# Patient Record
Sex: FEMALE | Race: WHITE | NOT HISPANIC OR LATINO
[De-identification: names, ages, dates, MRNs, and addresses within clinical notes are randomized per-mention and may not be internally consistent; named-entity substitution may affect disease eponyms.]

---

## 2017-01-12 ENCOUNTER — APPOINTMENT (OUTPATIENT)
Dept: NEUROSURGERY | Facility: CLINIC | Age: 57
End: 2017-01-12

## 2017-02-16 ENCOUNTER — RX RENEWAL (OUTPATIENT)
Age: 57
End: 2017-02-16

## 2017-03-23 ENCOUNTER — APPOINTMENT (OUTPATIENT)
Dept: NEUROSURGERY | Facility: CLINIC | Age: 57
End: 2017-03-23

## 2017-03-23 VITALS
TEMPERATURE: 97.9 F | DIASTOLIC BLOOD PRESSURE: 78 MMHG | WEIGHT: 173 LBS | RESPIRATION RATE: 16 BRPM | HEART RATE: 90 BPM | HEIGHT: 67 IN | SYSTOLIC BLOOD PRESSURE: 118 MMHG | BODY MASS INDEX: 27.15 KG/M2

## 2017-08-29 ENCOUNTER — APPOINTMENT (OUTPATIENT)
Dept: NEUROSURGERY | Facility: CLINIC | Age: 57
End: 2017-08-29
Payer: MEDICARE

## 2017-08-29 DIAGNOSIS — M43.10 SPONDYLOLISTHESIS, SITE UNSPECIFIED: ICD-10-CM

## 2017-08-29 PROCEDURE — 99214 OFFICE O/P EST MOD 30 MIN: CPT

## 2017-10-19 ENCOUNTER — APPOINTMENT (OUTPATIENT)
Dept: NEUROSURGERY | Facility: CLINIC | Age: 57
End: 2017-10-19
Payer: MEDICARE

## 2017-10-19 VITALS
WEIGHT: 179 LBS | HEART RATE: 85 BPM | RESPIRATION RATE: 16 BRPM | BODY MASS INDEX: 28.09 KG/M2 | HEIGHT: 67 IN | TEMPERATURE: 97.9 F

## 2017-10-19 DIAGNOSIS — D35.2 BENIGN NEOPLASM OF PITUITARY GLAND: ICD-10-CM

## 2017-10-19 DIAGNOSIS — G89.18 OTHER ACUTE POSTPROCEDURAL PAIN: ICD-10-CM

## 2017-10-19 PROCEDURE — 99214 OFFICE O/P EST MOD 30 MIN: CPT

## 2018-04-18 ENCOUNTER — APPOINTMENT (OUTPATIENT)
Dept: ORTHOPEDIC SURGERY | Facility: CLINIC | Age: 58
End: 2018-04-18
Payer: MEDICARE

## 2018-04-18 VITALS — HEIGHT: 67 IN | BODY MASS INDEX: 28.09 KG/M2 | WEIGHT: 179 LBS

## 2018-04-18 VITALS — HEART RATE: 143 BPM | SYSTOLIC BLOOD PRESSURE: 122 MMHG | DIASTOLIC BLOOD PRESSURE: 84 MMHG

## 2018-04-18 PROCEDURE — 73110 X-RAY EXAM OF WRIST: CPT | Mod: LT

## 2018-04-18 PROCEDURE — 99204 OFFICE O/P NEW MOD 45 MIN: CPT

## 2018-04-18 PROCEDURE — 73030 X-RAY EXAM OF SHOULDER: CPT | Mod: LT

## 2018-05-14 ENCOUNTER — APPOINTMENT (OUTPATIENT)
Dept: ORTHOPEDIC SURGERY | Facility: CLINIC | Age: 58
End: 2018-05-14

## 2018-05-18 ENCOUNTER — APPOINTMENT (OUTPATIENT)
Dept: ORTHOPEDIC SURGERY | Facility: CLINIC | Age: 58
End: 2018-05-18

## 2018-05-21 ENCOUNTER — APPOINTMENT (OUTPATIENT)
Dept: NEUROSURGERY | Facility: CLINIC | Age: 58
End: 2018-05-21
Payer: MEDICARE

## 2018-05-21 VITALS
HEIGHT: 67 IN | BODY MASS INDEX: 27.62 KG/M2 | TEMPERATURE: 98.1 F | RESPIRATION RATE: 16 BRPM | WEIGHT: 176 LBS | SYSTOLIC BLOOD PRESSURE: 127 MMHG | HEART RATE: 93 BPM | DIASTOLIC BLOOD PRESSURE: 84 MMHG

## 2018-05-21 PROCEDURE — 99213 OFFICE O/P EST LOW 20 MIN: CPT

## 2018-05-21 RX ORDER — MELOXICAM 15 MG/1
15 TABLET ORAL
Qty: 30 | Refills: 0 | Status: DISCONTINUED | COMMUNITY
Start: 2017-10-05 | End: 2018-05-21

## 2018-05-21 RX ORDER — BENAZEPRIL HYDROCHLORIDE 5 MG/1
5 TABLET ORAL
Refills: 0 | Status: ACTIVE | COMMUNITY

## 2018-05-21 RX ORDER — ATORVASTATIN CALCIUM 10 MG/1
10 TABLET, FILM COATED ORAL
Refills: 0 | Status: ACTIVE | COMMUNITY

## 2018-06-01 ENCOUNTER — APPOINTMENT (OUTPATIENT)
Dept: RHEUMATOLOGY | Facility: CLINIC | Age: 58
End: 2018-06-01
Payer: MEDICARE

## 2018-06-01 VITALS
RESPIRATION RATE: 16 BRPM | BODY MASS INDEX: 26.68 KG/M2 | DIASTOLIC BLOOD PRESSURE: 85 MMHG | HEART RATE: 86 BPM | WEIGHT: 170 LBS | HEIGHT: 67 IN | SYSTOLIC BLOOD PRESSURE: 125 MMHG | OXYGEN SATURATION: 98 % | TEMPERATURE: 98 F

## 2018-06-01 DIAGNOSIS — M25.50 PAIN IN UNSPECIFIED JOINT: ICD-10-CM

## 2018-06-01 PROCEDURE — 99204 OFFICE O/P NEW MOD 45 MIN: CPT

## 2018-06-04 LAB
25(OH)D3 SERPL-MCNC: 24 NG/ML
ALBUMIN SERPL ELPH-MCNC: 4.8 G/DL
ALP BLD-CCNC: 51 U/L
ALT SERPL-CCNC: 20 U/L
ANA SER IF-ACNC: NEGATIVE
ANION GAP SERPL CALC-SCNC: 16 MMOL/L
APPEARANCE: CLEAR
APTT BLD: 31.2 SEC
AST SERPL-CCNC: 23 U/L
B2 GLYCOPROT1 AB SER QL: NEGATIVE
BACTERIA: NEGATIVE
BASOPHILS # BLD AUTO: 0.02 K/UL
BASOPHILS NFR BLD AUTO: 0.5 %
BILIRUB SERPL-MCNC: 0.4 MG/DL
BILIRUBIN URINE: NEGATIVE
BLOOD URINE: NEGATIVE
BUN SERPL-MCNC: 20 MG/DL
CALCIUM SERPL-MCNC: 9.8 MG/DL
CARDIOLIPIN AB SER IA-ACNC: NEGATIVE
CCP AB SER IA-ACNC: <8 UNITS
CENTROMERE IGG SER-ACNC: <0.2 AL
CHLORIDE SERPL-SCNC: 100 MMOL/L
CO2 SERPL-SCNC: 23 MMOL/L
COLOR: YELLOW
CREAT SERPL-MCNC: 0.82 MG/DL
CREAT SPEC-SCNC: 133 MG/DL
CREAT/PROT UR: 0.1 RATIO
CRP SERPL-MCNC: 0.3 MG/DL
DSDNA AB SER-ACNC: 14 IU/ML
ENA RNP AB SER IA-ACNC: 0.3 AL
ENA SCL70 IGG SER IA-ACNC: 0.4 AL
ENA SM AB SER IA-ACNC: <0.2 AL
ENA SS-A AB SER IA-ACNC: <0.2 AL
ENA SS-B AB SER IA-ACNC: <0.2 AL
EOSINOPHIL # BLD AUTO: 0.12 K/UL
EOSINOPHIL NFR BLD AUTO: 3.1 %
ERYTHROCYTE [SEDIMENTATION RATE] IN BLOOD BY WESTERGREN METHOD: 11 MM/HR
GLUCOSE QUALITATIVE U: NEGATIVE MG/DL
GLUCOSE SERPL-MCNC: 97 MG/DL
HCT VFR BLD CALC: 35.7 %
HGB BLD-MCNC: 12 G/DL
HYALINE CASTS: 3 /LPF
IMM GRANULOCYTES NFR BLD AUTO: 0.5 %
KETONES URINE: NEGATIVE
LEUKOCYTE ESTERASE URINE: ABNORMAL
LYMPHOCYTES # BLD AUTO: 1.17 K/UL
LYMPHOCYTES NFR BLD AUTO: 30.2 %
MAN DIFF?: NORMAL
MCHC RBC-ENTMCNC: 32.1 PG
MCHC RBC-ENTMCNC: 33.6 GM/DL
MCV RBC AUTO: 95.5 FL
MICROSCOPIC-UA: NORMAL
MONOCYTES # BLD AUTO: 0.39 K/UL
MONOCYTES NFR BLD AUTO: 10.1 %
NEUTROPHILS # BLD AUTO: 2.15 K/UL
NEUTROPHILS NFR BLD AUTO: 55.6 %
NITRITE URINE: NEGATIVE
PH URINE: 7
PLATELET # BLD AUTO: 263 K/UL
POTASSIUM SERPL-SCNC: 4.4 MMOL/L
PROT SERPL-MCNC: 7.6 G/DL
PROT UR-MCNC: 17 MG/DL
PROTEIN URINE: ABNORMAL MG/DL
RBC # BLD: 3.74 M/UL
RBC # FLD: 13 %
RED BLOOD CELLS URINE: 5 /HPF
RF+CCP IGG SER-IMP: NEGATIVE
RHEUMATOID FACT SER QL: 11 IU/ML
SODIUM SERPL-SCNC: 139 MMOL/L
SPECIFIC GRAVITY URINE: 1.03
SQUAMOUS EPITHELIAL CELLS: 15 /HPF
THYROGLOB AB SERPL-ACNC: <20 IU/ML
THYROPEROXIDASE AB SERPL IA-ACNC: <10 IU/ML
TSH SERPL-ACNC: 0.65 UIU/ML
UROBILINOGEN URINE: NEGATIVE MG/DL
WBC # FLD AUTO: 3.87 K/UL
WHITE BLOOD CELLS URINE: 13 /HPF

## 2018-06-06 ENCOUNTER — MOBILE ON CALL (OUTPATIENT)
Age: 58
End: 2018-06-06

## 2018-06-15 LAB — RNA POLYMERASE III IGG: 20.3 U

## 2018-10-24 ENCOUNTER — APPOINTMENT (OUTPATIENT)
Dept: NEUROSURGERY | Facility: CLINIC | Age: 58
End: 2018-10-24
Payer: MEDICARE

## 2018-10-24 VITALS
SYSTOLIC BLOOD PRESSURE: 139 MMHG | HEIGHT: 65.79 IN | BODY MASS INDEX: 27.98 KG/M2 | HEART RATE: 68 BPM | RESPIRATION RATE: 16 BRPM | DIASTOLIC BLOOD PRESSURE: 86 MMHG | WEIGHT: 172 LBS

## 2018-10-24 DIAGNOSIS — R13.10 DYSPHAGIA, UNSPECIFIED: ICD-10-CM

## 2018-10-24 PROCEDURE — 99213 OFFICE O/P EST LOW 20 MIN: CPT

## 2018-11-18 PROBLEM — R13.10 SWALLOWING DIFFICULTY: Status: ACTIVE | Noted: 2018-11-18

## 2019-06-26 ENCOUNTER — APPOINTMENT (OUTPATIENT)
Dept: NEUROSURGERY | Facility: CLINIC | Age: 59
End: 2019-06-26

## 2019-10-09 ENCOUNTER — APPOINTMENT (OUTPATIENT)
Dept: NEUROSURGERY | Facility: CLINIC | Age: 59
End: 2019-10-09
Payer: MEDICARE

## 2019-10-09 VITALS
OXYGEN SATURATION: 96 % | BODY MASS INDEX: 26.29 KG/M2 | WEIGHT: 167.5 LBS | HEART RATE: 80 BPM | TEMPERATURE: 98.9 F | HEIGHT: 67 IN | SYSTOLIC BLOOD PRESSURE: 123 MMHG | DIASTOLIC BLOOD PRESSURE: 88 MMHG

## 2019-10-09 PROCEDURE — 99213 OFFICE O/P EST LOW 20 MIN: CPT

## 2019-10-09 RX ORDER — HYDROCODONE BITARTRATE AND ACETAMINOPHEN 7.5; 325 MG/1; MG/1
TABLET ORAL
Refills: 0 | Status: DISCONTINUED | COMMUNITY
End: 2019-10-09

## 2019-10-28 NOTE — CONSULT LETTER
[Dear  ___] : Dear  [unfilled], [Sincerely,] : Sincerely, [FreeTextEntry2] : Dr. Desean Cornelius [FreeTextEntry1] : I have seen your patient Joleen Qiu in my office to evaluate her ongoing problems with neck pain, headaches, and radiating pain and numbness into her arms. It is approximately one year since we saw the patient previously. This very pleasant 59-year-old woman has a very complicated past history. 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 In particular she underwent posterior cervical fusion at C3-4-for injury with instability. When we evaluated the patient in 2015 there was suspicion of progressive degenerative changes at her adjacent C4-5 level as well as identification of diffuse degenerative spondylosis at C5-6 and C6-7. Because of dynamic instability at C4-5 she underwent an anterior cervical discectomy and fusion procedure in September of 2016. Much of the acute neck pain was resolved by this procedure however over time the patient has continued to have progressive and persistent problems with mechanical neck pain as well as radiating pain into both shoulders and arms. We have had extensive discussions previously about additional surgery at C5-6 and C6-7 and the implications for significant loss of neck mobility. The patient has agreed with ongoing conservative measures. However, at this time the patient has undergone several rounds of physical therapy and used acupuncture without any persistent relief. She is taking Norco under the direction of pain management on a daily basis. The patient rates her pain as a 7/10 which is almost continuous and certainly exacerbated by any activities. She denies any difficulties with walking or gentle exercise. There is no bowel or bladder dysfunction.\par \par There are no new images are reviewed at today's visit. I have gone over the CT and x-ray images from over one year ago. There is intact alignment and fusion construct at C3-4 and C4-5. Once again there is notation of significant degenerative changes at C5-6 and C6-7. I've also reviewed the patient's EMG nerve conduction studies from 2016 may which identified bilateral C5-6 and C6-7 radiculopathy, ulnar neuropathy, and mild carpal tunnel syndrome. The patient also is being treated for Raynaud's syndrome. The patient has been investigated by rheumatology and the diagnosis of osteoarthritis has been provided.\par \par On examination of the patient is in acute distress with her symptoms. The patient has a painful neck range of motion with all activities. The patient indicates significant for suboccipital and paraspinal muscular spasming with painful palpation. This is associated with intense headaches which radiate forward in an occipital neuralgia type pattern. The patient indicates pain down both arms and hands especially involving the first 3 fingers possibly right and left. Biceps and brachioradialis reflexes are present and symmetric. Stephens sign is negative.\par \par Once again the patient presents with a challenging set of ongoing painful symptoms. The patient indicates that her most prominent complaint is the neck pain with associated headaches followed by an arm symptoms. It is my impression that the generator of pain comes from the osteoarthritic changes which persist in her neck. Once again I have reviewed with the patient the potential need for surgical intervention and extension of her decompression and fusion across the C5-6 and C6-7 levels. However as always been concerned about creating loss of mobility of the neck extending from C3-C7. The patient also indicates an ongoing desire to avoid further fusion surgery if possible. I have provided the patient with a prescription and referral for treatment with mind neurology colleagues to consider focal injections and possibly Botox therapy for the intense persistent muscular spasms in the posterior neck musculature which are associated with headaches. In addition I have asked for updated dynamic x-rays of the cervical spine to determine if there is progressive instability and the adjacent segments of the neck especially of concern at C5-6. Furthermore, I have asked for an MRI scan to be updated to evaluate the degenerative changes at C5-6 and C6-7 to determine if there has been progression though would be best treated with surgical intervention.I don’t currently think an additional EMG/NC study will be required for surgical decision making\par \par I will see the patient again in my office after she has undergone further imaging studies and consultation by neurology. [FreeTextEntry3] : Pj Martínez MD, PhD, FRCPSC\par  of Neurosurgery\par [facility]\par  of Neurosurgery\par [facility] \par [address] [place] [address], [address] Floor\[place] [address]\par Tel: [phone]\par FAX: [phone]\par Email: [email or web address]\par \par

## 2019-11-14 ENCOUNTER — APPOINTMENT (OUTPATIENT)
Dept: NEUROSURGERY | Facility: CLINIC | Age: 59
End: 2019-11-14

## 2019-12-03 ENCOUNTER — APPOINTMENT (OUTPATIENT)
Dept: NEUROSURGERY | Facility: CLINIC | Age: 59
End: 2019-12-03
Payer: MEDICARE

## 2019-12-03 VITALS
OXYGEN SATURATION: 97 % | BODY MASS INDEX: 26.38 KG/M2 | TEMPERATURE: 98.2 F | HEART RATE: 76 BPM | SYSTOLIC BLOOD PRESSURE: 128 MMHG | HEIGHT: 67 IN | WEIGHT: 168.06 LBS | DIASTOLIC BLOOD PRESSURE: 75 MMHG

## 2019-12-03 DIAGNOSIS — R51 HEADACHE: ICD-10-CM

## 2019-12-03 PROCEDURE — 99213 OFFICE O/P EST LOW 20 MIN: CPT

## 2019-12-03 NOTE — CONSULT LETTER
[Dear  ___] : Dear  [unfilled], [Courtesy Letter:] : I had the pleasure of seeing your patient, [unfilled], in my office today. [Sincerely,] : Sincerely, [FreeTextEntry1] : I have seen your patient Joleen Qiu in my office to review her recent imaging studies of the cervical spine. As you will recall of this very pleasant 59-year-old woman has had extensive problems with degenerative spondylosis of the cervical spine and a fracture dislocation. She underwent a previous posterior C3-4 fusion in 2012. She had persistent instability at the adjacent C4-5 level which was treated with an anterior cervical discectomy and fusion in 2016. He was always recognized that the patient also had significant degenerative changes at C5-6 and C6-7. Unfortunately, over time the patient has had increasing and progressive problems with severe neck pain and radiculopathy involving both arms left worse than right. The neck pain is also associated with referred headaches in the suboccipital region and radiating for work. EMG nerve conduction studies have confirmed bilateral radiculopathies at C5-6 and C6-7.\par \par The patient has now undergone an updated MRI images and dynamic x-rays of the cervical spine for the purposes of surgical decision making. The images confirm a further progressive kyphotic reversal of the cervical spine ankle and a retrolisthesis at C6-7. With flexion extension there is fish mouthing of the disc spaces though there is no significant dynamic translation at C5-6 and C6-7. The MRI scan once again demonstrates extensive spondylotic changes with compression of the exiting nerve roots and compromise of the central canal. There is no myelomalacia at these levels.\par \par There is no change in the patient's neurologic examination since her previous assessment 7 weeks ago.\par \par I have reviewed with the patient that her images and case history has been reviewed by our spine group. Based on the patient's progressive pain profile, her progressive degenerative anatomy, positive bilateral radiculopathy at C5-6 and C6-7 confirmed with EMG nerve conduction studies and the patient's failure to respond in a meaningful way to ongoing conservative physical therapy and pain management for over a year she is a reasonable candidate for surgical intervention. It is our recommendation that the patient undergo an anterior cervical discectomy and fusion at these 2 affected levels. The patient indicates that she is just started a new job and will not be able to undergo treatment for at least 6 months. Pain management support. She has had previous positive response, though temporary, 2 occipital nerve blockade with local anesthetic and steroid. It may be beneficial to consider use of Botox to decrease her cervical muscular spasming with a view to temporizing until definitive surgery can be performed. The patient has indicated she will contact us as soon as she has appropriate supports to move forward with feet defined surgery.\par \par Thank you for very kindly including me in the ongoing evaluation and treatment of your patient. Please do not hesitate to contact me should you have any questions or concerns regarding this evaluation, the patient's diagnosis, or the recommendation to move forward with surgery at the soonest convenient time. [FreeTextEntry2] : Dr. Desean Cornelius MD\par [address] [place] [address]\[place] [address] \par  [FreeTextEntry3] : Pj Martínez MD, PhD, FRCPSC \par Attending Neurosurgeon \par  of Neurosurgery \par [facility] \par [address], [address]nd floor \[place] [address] \par Office: [phone] \par Fax: [phone]\par \par  [DrAmi  ___] : Dr. TREADWELL

## 2020-01-17 ENCOUNTER — APPOINTMENT (OUTPATIENT)
Dept: CARDIOLOGY | Facility: CLINIC | Age: 60
End: 2020-01-17

## 2020-01-25 ENCOUNTER — TRANSCRIPTION ENCOUNTER (OUTPATIENT)
Age: 60
End: 2020-01-25

## 2020-04-13 ENCOUNTER — APPOINTMENT (OUTPATIENT)
Dept: CARDIOLOGY | Facility: CLINIC | Age: 60
End: 2020-04-13
Payer: MEDICARE

## 2020-04-13 DIAGNOSIS — G45.8 OTHER TRANSIENT CEREBRAL ISCHEMIC ATTACKS AND RELATED SYNDROMES: ICD-10-CM

## 2020-04-13 PROCEDURE — 99202 OFFICE O/P NEW SF 15 MIN: CPT | Mod: 95

## 2020-04-13 NOTE — HISTORY OF PRESENT ILLNESS
[Home] : at home, [unfilled] , at the time of the visit. [Medical Office: ([facility])___] : at ~his/her~ medical office located in [place] [Patient] : the patient [FreeTextEntry1] : Ms. Qiu was seen via tele-health visit for vascular consultation for subclavian steal.  She has a history of HTN, HLD, and cervical spine disease for which she reports 2 prior surgeries 3 and 6 years ago on the cervical spine.  she reports fusion of C 3-5.  May need to have C 6 and 7 surgery.  When she walks, or over uses her arms, such as with raking or mowing the lawn she feels fatigued in both arms, and states that the RIGHT is worse than the LEFT.  Her shoulders get fatigued as well.  She states she is fairly active otherwise and likes to walk many miles per day.  Never smoker.  No prior arterial stents.  Her PCP is Dr. Cornelius. Denies any syncope, vision loss, dizziness with arm use.  \par \par She has a BP cuff at home and she was able to check her BP during the visit:\par right arm:\par 153/106\par 154/95\par 156/97\par 153/103\par \par Left arm:  \par 134/88\par 133/91\par 136/92\par \par works at [employer].  \par Had rheumatologic workup with Dr. Carvalho in 2018 with negative inflammatory markers.

## 2020-04-13 NOTE — ASSESSMENT
[FreeTextEntry1] : Assessment:\par 1.  Left sided subclavian steal \par - asymptomatic \par - BP discrepancy 20 mmHg between arms\par 2.  HTN\par - R sided  \par 3.  HLD\par 4.  Cervical spine disease\par \par Plan:\par 1.  Continue aspirin daily for athero seen on CT scan from 2010 \par 2.  Would pursue further imaging to clarify etiology of the subclavian stenosis (athero vs. large vessel vasculitis) however favor athero as her inflammatory markers were negative in the past.\par - bilateral upper extremity arterial duplex with focus on the subclavian\par - Depending on these results will decide on CT angio chest\par 3.  I think most of her symptoms are due to her cervical spine disease.\par 4.  Await subclavian artery duplex\par

## 2020-06-23 ENCOUNTER — APPOINTMENT (OUTPATIENT)
Dept: CARDIOLOGY | Facility: CLINIC | Age: 60
End: 2020-06-23

## 2020-08-03 ENCOUNTER — APPOINTMENT (OUTPATIENT)
Dept: NEUROSURGERY | Facility: CLINIC | Age: 60
End: 2020-08-03
Payer: MEDICARE

## 2020-08-03 VITALS
TEMPERATURE: 98 F | OXYGEN SATURATION: 96 % | HEIGHT: 67 IN | BODY MASS INDEX: 26.37 KG/M2 | SYSTOLIC BLOOD PRESSURE: 150 MMHG | WEIGHT: 168 LBS | HEART RATE: 76 BPM | DIASTOLIC BLOOD PRESSURE: 108 MMHG

## 2020-08-03 DIAGNOSIS — M21.371 FOOT DROP, RIGHT FOOT: ICD-10-CM

## 2020-08-03 DIAGNOSIS — R49.0 DYSPHONIA: ICD-10-CM

## 2020-08-03 PROCEDURE — 99214 OFFICE O/P EST MOD 30 MIN: CPT

## 2020-08-19 NOTE — CONSULT LETTER
[Dear  ___] : Dear  [unfilled], [Courtesy Letter:] : I had the pleasure of seeing your patient, [unfilled], in my office today. [Sincerely,] : Sincerely, [FreeTextEntry2] : Dr. Desean Cornelius MD\par [address] [place] [address]\[place] [id number]  [FreeTextEntry1] : This very pleasant 60-year-old woman is well-known to our service and returns today for further evaluation of her chronic neck and arm symptoms as well as concern over lower back pain with sciatica.  The patient was initially seen for significant mechanical neck pain.  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  The patient had significant adjacent instability with pain and radiculopathy that warranted an anterior cervical discectomy and fusion at the C4-5 level which was performed in September 2016.  The patient is suffering from degenerative arthritis and is followed closely by rheumatology.  Unfortunately the patient is continued to have progressive spondylotic degeneration and at our last assessment imaging showed progressive changes at C5-6 and C6-7.  EMG nerve conduction studies also confirm radiculopathy which is consistent with the patient's arm and hand symptoms especially on the left-hand side.  In addition the patient is also suffering from lower back pain and a right foot drop.  The patient denies any bowel or bladder dysfunction.  She is troubled by her symptoms on a daily basis.  The patient has recently transitioned to a new job which has decreased on the physical wear and tear and exacerbation of symptoms.  The patient also notes that she continues to have some persistent voice hoarseness and voice fatigue.\par \par There is no new imaging to review at today's visit.  X-ray of the cervical spine was done in October 2019 which did not demonstrate any anterior swelling or deviation of the trachea the fusion construct was stable.  I performed at the same time of the cervical spine showed some progressive degenerative changes with disc and osteophyte at C5-6 and C6-7.\par \par On examination the patient is clearly in acute distress with her neck symptoms.  The patient has decreased range of motion secondary to pain.  The patient has good shoulder range of motion.  The patient has intense neck and muscular pain to palpation in the suboccipital and parascapular region.  There is radiating pain down both arms especially on the left-hand side and involvement with decreased sensation in the first 3 fingers.  The biceps and brachioradialis reflexes are symmetric and normal.  Krish sign is negative.  The patient also has decreased lumbosacral range of motion.  There is increased pain and discomfort down the right leg with straight leg raise.  The patella reflexes are symmetric and normal the ankle reflexes are significantly decreased but present.  There is no clonus.  Patient has sensory decrease especially on the right-hand side involving the lateral calf and top of foot.  The patient has significant weakness of dorsiflexion on the left compared to the right.  The patient is able to do repeated toe raises.\par \par The patient has participated appropriately in extensive physical therapy and Allied supportive treatment modalities.  She is being supported by rheumatology for specific targeted treatment of arthritis.  Because of the patient's changing and progressive symptom profile I have asked for the patient undergo an updated MRI scan of the cervical spine before considering moving forward with additional surgery to decompress the C5-6 and C6-7 levels.  Both of these levels have been determined to have nerve root compression based on EMG nerve conduction studies as well as the patient's physical exam and symptoms.  The patient also has concerning symptoms of a right foot weakness of dorsiflexion and exam suggestive of a L4-5 radiculopathy.  An MRI is ordered to better understand the extent of degenerative changes within the lumbar region that may correlate with the spondylosis identified in the cervical spine.  I will see the patient again in my office once these 2 diagnostic tests have been performed to make decisions regarding surgical intervention and timing.\par \par I have made a referral to an ENT specialist for the patient have a direct fiberoptic imaging of the functional activity of the vocal cords because of her persistent hoarseness after surgery.\par \par Thank you for very kindly including me in the evaluation and ongoing treatment of your patient.  Please do not hesitate to contact me should you have any questions or concerns regarding this evaluation, the patient's diagnosis, or the request for additional diagnostic imaging studies for the purpose of surgical decision making. [FreeTextEntry3] : Pj Martínez MD, PhD, FRCPSC \par Attending Neurosurgeon \par  of Neurosurgery \par [facility] \par [address] [facility] [address] [facility], 2nd floor \[place] [address] \par Office: [phone] \par Fax: [phone]\par \par

## 2020-10-19 ENCOUNTER — APPOINTMENT (OUTPATIENT)
Dept: NEUROSURGERY | Facility: CLINIC | Age: 60
End: 2020-10-19
Payer: MEDICARE

## 2020-10-19 VITALS
OXYGEN SATURATION: 97 % | WEIGHT: 168 LBS | HEIGHT: 67 IN | HEART RATE: 73 BPM | DIASTOLIC BLOOD PRESSURE: 85 MMHG | TEMPERATURE: 97.2 F | SYSTOLIC BLOOD PRESSURE: 121 MMHG | BODY MASS INDEX: 26.37 KG/M2

## 2020-10-19 PROCEDURE — 99072 ADDL SUPL MATRL&STAF TM PHE: CPT

## 2020-10-19 PROCEDURE — 99213 OFFICE O/P EST LOW 20 MIN: CPT

## 2020-10-31 NOTE — CONSULT LETTER
[Dear  ___] : Dear  [unfilled], [Sincerely,] : Sincerely, [Courtesy Letter:] : I had the pleasure of seeing your patient, [unfilled], in my office today. [FreeTextEntry2] : Dr. Desean Cornelius MD\par [address] [place] [address]\[place] [address] \par  [FreeTextEntry1] : This very pleasant 60-year-old woman returns for further discussion of treatment options for her ongoing progressive neck pain with radiating symptoms into both arms left greater than right.  We previously saw the patient in August 2020.  The patient has now undergone updated MRI images.  You may recall that the patient has extensive arthritic degenerative spondylosis affecting the cervical spine.  There is a past history of trauma.  She has undergone previous posterior C3-4 instrumentation as well as an anterior cervical discectomy and fusion across C4-5.  Recent EMG nerve conduction studies have indicated cervical radiculopathy involving the C5-6 and C6-7 levels.\par \par The patient returns today indicating at this point that she has constant severe neck pain.  She has limited range of motion because of sharp pain and also increasing problems with left arm and hand numbness greater than the right.  She continues to take Norco on a daily basis and has had some minimal improvement in symptom control with the use of gabapentin.\par \par I have reviewed the patient's MRI images from [facility] in September 2020.  These are compared to previous x-rays, CTs, and MRIs.  There is certainly a progressive degenerative changes especially at the C5-6 and C6-7 levels.  The previous instrumentation and decompression appears to be stable.  There is severe central stenosis as well as profound loss of the disc height and significant osteophytes impinging in the foramina especially on the left-hand side.\par \par There is no change in the patient's neurologic evaluation since our previous assessment.\par \par I have discussed in detail with the patient her treatment options.  There is no question that she is appropriate for decompression and fusion surgery at the C5-6 and C6-7 levels.  I had previously discussed this option with the patient but was concerned about the significant change in overall neck mobility.  At this point in time the patient's severity of daily symptoms is dramatically affecting every aspect of her life.  Physical therapy and pain management has been unable to make a significant improvement in her day-to-day symptoms over the last several years.  The symptoms are progressive.  I have reviewed the patient's case as well with multidisciplinary spine board.  The recommendation is for ACDF at C5-6 and C6-7 with the understanding that she may have some persistent mechanical neck pain and arthritic neck pain.  The patient has indicated good understanding.  The patient would like to consider further her options before committing to surgical intervention.  The patient has indicated she will contact us likely after the holiday season to review this and make a decision regarding treatment.\par \par Thank you for for kindly including me in the ongoing evaluation and treatment of your patient.  Please do not hesitate to contact me should you have any questions or concerns regarding this evaluation, or the patient's treatment options especially consideration of an anterior cervical discectomy and fusion procedure at the C5-6 and C6-7 levels. [FreeTextEntry3] : Pj Martínez MD, PhD, FRCPSC \par Attending Neurosurgeon \par  of Neurosurgery \par [facility] \par [address] [facility], [address]nd floor \[place] [address] \par Office: [phone] \par Fax: [phone]\par \par

## 2021-03-08 ENCOUNTER — APPOINTMENT (OUTPATIENT)
Dept: RHEUMATOLOGY | Facility: CLINIC | Age: 61
End: 2021-03-08

## 2021-06-14 ENCOUNTER — APPOINTMENT (OUTPATIENT)
Dept: NEUROSURGERY | Facility: CLINIC | Age: 61
End: 2021-06-14
Payer: MEDICARE

## 2021-06-14 VITALS
HEART RATE: 87 BPM | SYSTOLIC BLOOD PRESSURE: 137 MMHG | OXYGEN SATURATION: 96 % | BODY MASS INDEX: 25.27 KG/M2 | WEIGHT: 161 LBS | DIASTOLIC BLOOD PRESSURE: 87 MMHG | TEMPERATURE: 97.9 F | HEIGHT: 67 IN

## 2021-06-14 DIAGNOSIS — M47.816 SPONDYLOSIS W/OUT MYELOPATHY OR RADICULOPATHY, LUMBAR REGION: ICD-10-CM

## 2021-06-14 PROCEDURE — 99213 OFFICE O/P EST LOW 20 MIN: CPT

## 2021-06-14 PROCEDURE — 99072 ADDL SUPL MATRL&STAF TM PHE: CPT

## 2021-06-28 ENCOUNTER — APPOINTMENT (OUTPATIENT)
Dept: RHEUMATOLOGY | Facility: CLINIC | Age: 61
End: 2021-06-28
Payer: MEDICARE

## 2021-06-28 VITALS
SYSTOLIC BLOOD PRESSURE: 95 MMHG | WEIGHT: 168 LBS | BODY MASS INDEX: 26.37 KG/M2 | OXYGEN SATURATION: 93 % | DIASTOLIC BLOOD PRESSURE: 70 MMHG | TEMPERATURE: 98 F | HEIGHT: 67 IN | HEART RATE: 73 BPM | RESPIRATION RATE: 16 BRPM

## 2021-06-28 DIAGNOSIS — Z86.39 PERSONAL HISTORY OF OTHER ENDOCRINE, NUTRITIONAL AND METABOLIC DISEASE: ICD-10-CM

## 2021-06-28 PROCEDURE — 99072 ADDL SUPL MATRL&STAF TM PHE: CPT

## 2021-06-28 PROCEDURE — 99205 OFFICE O/P NEW HI 60 MIN: CPT

## 2021-06-28 NOTE — ASSESSMENT
[FreeTextEntry1] : 61 year old female was referred for a positive RANI.  She does not exhibit any obvious signs/symptoms of connective tissue disease at this time.  I have therefore ordered some more bloodwork, including serologies, as further workup.\par She does c/o polyarticular arthritis, most suggestive of osteoarthritis.  I have therefore also ordered some x-rays to confirm her diagnosis and rule out other possible etiologies.  \par She will follow up with me again in 2-3 weeks to review her results.  In the meantime, I have recommended conservative treatment, including exercises, ibuprofen and/or Tylenol prn, warm compresses, and weight loss.

## 2021-06-28 NOTE — DATA REVIEWED
[FreeTextEntry1] : Hgb 11.0; rest of CBC unremarkable\par ALT 68; rest of CMP unremarkable\par (+)RANI 1:160\par ESR 53\par CRP 3.79 mg/dL\par Lyme negative\par UA:  30 protein;  (+)nitrites, large LE;  160 WBC, 8 RBC

## 2021-06-28 NOTE — PHYSICAL EXAM
[General Appearance - Alert] : alert [General Appearance - In No Acute Distress] : in no acute distress [Sclera] : the sclera and conjunctiva were normal [Outer Ear] : the ears and nose were normal in appearance [Oropharynx] : the oropharynx was normal [Neck Appearance] : the appearance of the neck was normal [Neck Cervical Mass (___cm)] : no neck mass was observed [Jugular Venous Distention Increased] : there was no jugular-venous distention [Thyroid Diffuse Enlargement] : the thyroid was not enlarged [Thyroid Nodule] : there were no palpable thyroid nodules [Auscultation Breath Sounds / Voice Sounds] : lungs were clear to auscultation bilaterally [Heart Rate And Rhythm] : heart rate was normal and rhythm regular [Heart Sounds] : normal S1 and S2 [Heart Sounds Gallop] : no gallops [Murmurs] : no murmurs [Heart Sounds Pericardial Friction Rub] : no pericardial rub [Edema] : there was no peripheral edema [Bowel Sounds] : normal bowel sounds [Abdomen Soft] : soft [Abdomen Tenderness] : non-tender [Abdomen Mass (___ Cm)] : no abdominal mass palpated [Cervical Lymph Nodes Enlarged Posterior Bilaterally] : posterior cervical [Cervical Lymph Nodes Enlarged Anterior Bilaterally] : anterior cervical [Supraclavicular Lymph Nodes Enlarged Bilaterally] : supraclavicular [Skin Color & Pigmentation] : normal skin color and pigmentation [Skin Turgor] : normal skin turgor [] : no rash [No Focal Deficits] : no focal deficits [Oriented To Time, Place, And Person] : oriented to person, place, and time [Impaired Insight] : insight and judgment were intact [Affect] : the affect was normal [FreeTextEntry1] : No synovitis;  (+)B/L Heberden's nodes;  (+)tenderness over B/L 1st CMC, left 1st MCP; B/L knees w/ (+)crepitus;  B/L hips w/ pain upon internal rotation;  rest of joints w/ normal ROM

## 2021-06-28 NOTE — HISTORY OF PRESENT ILLNESS
[FreeTextEntry1] : 61 year old female with PMHx as listed below reports that she has been experiencing joint pains, including her hands, knees, ankles, and neck for the past 1-2 years.  The pain is constant, though worse with activity and less severe at rest.  She describes the pain as dull/achy, up to 9 out of 10.  She denies any joint swelling or erythema.  AM stiffness.  She gets some relief from ice or Norco.  No other known alleviating factors.\par No F/C, no unintentional weight loss, no night sweats, no oral ulcers, no rashes, no alopecia, no photosensitivity, no dry eyes/dry mouth, no Raynaud symptoms, no focal weakness, no dysphagia  [Anorexia] : no anorexia [Weight Loss] : no weight loss [Malaise] : no malaise [Fever] : no fever [Chills] : no chills [Malar Facial Rash] : no malar facial rash [Fatigue] : no fatigue [Skin Lesions] : no lesions [Skin Nodules] : no skin nodules [Oral Ulcers] : no oral ulcers [Cough] : no cough [Dry Mouth] : no dry mouth [Dysphonia] : no dysphonia [Dysphagia] : no dysphagia [Shortness of Breath] : no shortness of breath [Chest Pain] : no chest pain [Arthralgias] : no arthralgias [Joint Swelling] : no joint swelling [Joint Warmth] : no joint warmth [Joint Deformity] : no joint deformity [Decreased ROM] : no decreased range of motion [Morning Stiffness] : no morning stiffness [Falls] : no falls [Difficulty Standing] : no difficulty standing [Difficulty Walking] : no difficulty walking [Dyspnea] : no dyspnea [Myalgias] : no myalgias [Muscle Weakness] : no muscle weakness [Muscle Spasms] : no muscle spasms [Muscle Cramping] : no muscle cramping [Visual Changes] : no visual changes [Eye Pain] : no eye pain [Eye Redness] : no eye redness [Dry Eyes] : no dry eyes

## 2021-06-29 LAB
C3 SERPL-MCNC: 143 MG/DL
C4 SERPL-MCNC: 32 MG/DL
CENTROMERE IGG SER-ACNC: <0.2 CD:130001892
CRP SERPL-MCNC: <3 MG/L
DSDNA AB SER-ACNC: 14 IU/ML
ENA RNP AB SER IA-ACNC: 0.3 AL
ENA SCL70 IGG SER IA-ACNC: 0.3 AL
ENA SM AB SER IA-ACNC: <0.2 AL
ENA SS-A AB SER IA-ACNC: <0.2 AL
ENA SS-B AB SER IA-ACNC: <0.2 AL
ERYTHROCYTE [SEDIMENTATION RATE] IN BLOOD BY WESTERGREN METHOD: 26 MM/HR
THYROGLOB AB SERPL-ACNC: <20 IU/ML
THYROPEROXIDASE AB SERPL IA-ACNC: <10 IU/ML

## 2021-07-02 LAB — RNA POLYMERASE III IGG: 14 UNITS

## 2021-07-25 PROBLEM — M47.816 LUMBAR SPONDYLOSIS: Status: ACTIVE | Noted: 2020-08-03

## 2021-07-25 NOTE — CONSULT LETTER
[Dear  ___] : Dear  [unfilled], [Courtesy Letter:] : I had the pleasure of seeing your patient, [unfilled], in my office today. [Sincerely,] : Sincerely, [FreeTextEntry2] : Desean Cornelius MD\par [address] [place] [address]\[place] [address] \par  [FreeTextEntry1] : This very pleasant 61-year-old patient is well-known to me for chronic neck pain and multifocal arthritis.  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  She developed progressive mechanical neck pain and unstable spondylolisthesis at the adjacent C4-5 level which was treated with an anterior cervical discectomy and fusion by myself in 2016.  Following surgery the patient had significant resolution of her mechanical neck pain.  She has been followed conservatively for the previously recognized degenerative spondylosis at C5-6 and C6-7.  Prior EMG nerve conduction studies did confirm radiculopathies at both levels.  However, most of her pain was arthritic in nature and she has been treated conservatively with pain management and physical therapy.  The patient returns today (last evaluation was October 2020) indicating that for the last 2 to 3 months she has had new symptoms of tingling down the left arm into her hand as well as numbness tingling and cramping with weakness involving both legs.  She denies any specific trauma or onset event.  The patient denies any bowel or bladder dysfunction.  However, she is having difficulty with walking secondary to pain and weakness.\par \par There is no new imaging to evaluate at today's visit.  We did review the previous CT and MRI images which showed stable fusion construct across C3-4 and C4-5.  There is also the degenerative spondylosis at C5-6 and C6-7.\par \par On examination the patient is clearly in distress with neck pain.  There is pain with range of motion.  The patient maps pain radiating through the parascapular region predominantly on the left-hand side and extending into the hand including in particular the first 3 fingers and thumb on the left-hand side and to a lesser degree the lateral aspect of the hand.  Patient has reasonable  strength.  There appears to be some thenar muscle wasting.  There also is tenderness to the proximal carpal joint on the left hand side.  Also pain at the wrist and suggestion of de Quervain's.  Otherwise, the patient does have reasonable strength.  Biceps, triceps, and brachioradialis reflexes are symmetric and normal.  From and sign is negative.  There is decreased sensation involving the entire head and and extending laterally up through the forearm.  Is normal on the right-hand side by comparison.  There appears to be normal strength at rest in both lower extremities.  Hip flexion, knee extension, and plantar and dorsi flexion are intact.  The patient has pain with range of motion in the lumbosacral region.  Patella and ankle reflexes are present.  There is no clonus.  Sensation is grossly normal.\par \par The patient has past history of investigations with rheumatology.  There was positive serum test for antinuclear antigen.  The patient has not apparently been further investigated for other possible inflammatory or connective tissue disorders.  She has not been on any specific anti-inflammatory treatment protocol.  The patient is taking a magnesium supplement to try and curb her recent bilateral leg cramping symptoms.  Patient certainly has evidence of progressive cervical radiculopathy.  Based on the patient's past progressive imaging with evidence of spondylosis and positive EMG nerve conduction studies indicating radiculopathy I have asked for the patient undergo an updated MRI scan for the purposes of surgical decision making.  Once again I would be quite concerned about adding 2 additional levels of decompression and fusion because of the implications for the patient's neck range of motion.  Before making a surgical decision updated imaging will be very pertinent.  Because of the patient's suspected arthritis or osteoarthritis involving multiple joints it is quite possible that there is progressive degenerative changes within the lumbar spine which are creating her bilateral leg symptoms.  If nothing is specifically seen in the cervical spine such as spinal cord compression with myelomalacia then an MRI scan of the lumbar spine is appropriate to rule out similar extensive degenerative changes which may be causing these new progressive leg symptoms.  Therefore an MRI scan has been ordered.  I have encouraged the patient to be seen by rheumatologist for further evaluation of the previous positive inflammatory serology tests to determine if there is a role for treatment to mitigate her symptoms.  I will see the patient again in my office once these imaging studies have been performed to evaluate if there is a role for surgical intervention.  The patient indicated good understanding of my descriptions and explanations and is in agreement with the proposed testing.\par \par Thank you for very kindly including me in the evaluation and treatment of your patient.  Please do not hesitate to contact me should you have any concerns or questions regarding this evaluation or the patient's ongoing follow-up care plan. [FreeTextEntry3] : Pj Martínez MD, PhD, FRCPSC \par Attending Neurosurgeon \par  of Neurosurgery \par [facility] \par [address], [address]nd floor \[place] [address] \par Office: [phone] \par Fax: [phone]\par \par

## 2021-08-09 ENCOUNTER — APPOINTMENT (OUTPATIENT)
Dept: NEUROSURGERY | Facility: CLINIC | Age: 61
End: 2021-08-09
Payer: MEDICARE

## 2021-08-09 VITALS
HEART RATE: 77 BPM | BODY MASS INDEX: 25.27 KG/M2 | OXYGEN SATURATION: 95 % | TEMPERATURE: 97.2 F | WEIGHT: 161 LBS | HEIGHT: 67 IN | DIASTOLIC BLOOD PRESSURE: 79 MMHG | SYSTOLIC BLOOD PRESSURE: 108 MMHG

## 2021-08-09 PROCEDURE — 99214 OFFICE O/P EST MOD 30 MIN: CPT

## 2021-08-16 ENCOUNTER — APPOINTMENT (OUTPATIENT)
Dept: RHEUMATOLOGY | Facility: CLINIC | Age: 61
End: 2021-08-16
Payer: MEDICARE

## 2021-08-16 VITALS
WEIGHT: 161 LBS | HEART RATE: 89 BPM | TEMPERATURE: 97 F | HEIGHT: 67 IN | SYSTOLIC BLOOD PRESSURE: 89 MMHG | BODY MASS INDEX: 25.27 KG/M2 | DIASTOLIC BLOOD PRESSURE: 62 MMHG | OXYGEN SATURATION: 96 %

## 2021-08-16 DIAGNOSIS — M25.562 PAIN IN RIGHT KNEE: ICD-10-CM

## 2021-08-16 DIAGNOSIS — M25.561 PAIN IN RIGHT KNEE: ICD-10-CM

## 2021-08-16 DIAGNOSIS — M89.49 OTHER HYPERTROPHIC OSTEOARTHROPATHY, MULTIPLE SITES: ICD-10-CM

## 2021-08-16 DIAGNOSIS — R76.8 OTHER SPECIFIED ABNORMAL IMMUNOLOGICAL FINDINGS IN SERUM: ICD-10-CM

## 2021-08-16 PROCEDURE — 99214 OFFICE O/P EST MOD 30 MIN: CPT

## 2021-08-16 NOTE — CONSULT LETTER
[Dear  ___] : Dear  [unfilled], [Courtesy Letter:] : I had the pleasure of seeing your patient, [unfilled], in my office today. [Sincerely,] : Sincerely, [FreeTextEntry2] : Desean Cornelius MD\par [address] [place] [address]\[place] [address] \par   [FreeTextEntry1] : This very pleasant 61-year-old woman returns at a 2-month interval for further evaluation and discussion of treatment for ongoing neck pain with bilateral cervical radiculopathy.  As you may recall the patient has extensive multifocal arthritis throughout the body.  She had an initial neck injury many years ago as a result of a gymnastics accident which resulted in a posterior C3-4 fusion.  She developed adjacent segment instability with hypermobility which required stabilization with an anterior cervical discectomy and fusion at C4-5 was performed in 2016.  The patient now has ongoing constant neck pain with associated cervicalgia and migraine headaches.  Her radiating symptoms down the arms have progressed and she has EMG nerve conduction studies which confirms radiculopathy at C5-6 and C6-7.  The patient has undergone extensive support by pain management and has done multiple rounds of physical therapy over the last 5 years without sustained benefit.  The patient is currently taking daily gabapentin and Norco.  She uses a TENS machine daily.\par \par I have reviewed with the patient her most recent MRI images of the cervical spine.  Once again we see extensive spondylotic degeneration with loss of the normal ptotic curvature of the cervical spine.  Also reviewed the x-rays and CT images which confirm that the previous hardware constructs are stable at C3-4 and C4-5.\par \par There is no significant change in the patient's neurologic examination since my previous assessment in June.  Patient certainly has radiculopathy left greater than right consistent with C5-6 and C6-7 radiculopathy.  There is 3 changes to light touch and pinprick.  There is thenar muscle wasting on the left-hand side.  The patient has reasonable strength of biceps, triceps, wrist extension but the exam is slightly modified because of joint pain.  With cervical extension or rotation with flexion to the side the patient has immediate increased symptoms radiating down the arms into the hands left greater than right.\par \par The patient has certainly undergone appropriate attempts at conservative treatment to resolve her symptoms.  Management by rheumatology, pain management, and physical therapy has not provided significant relief.  I have been concerned about the implications of further fusion surgery regarding her symptom profile but at this point in time the patient has substantial mechanical pain as well as confirmed radiculopathy that warrants consideration of decompression.  I have reviewed the patient's imaging studies with our spine group and an anterior approach with anterior cervical discectomy and fusion has been recommended by the consensus review.  The patient has indicated good understanding of the implications of further restriction in motion of her neck and is anxious to move forward with resolution of the radicular symptoms affecting her arms especially on the left-hand side.  We will make arrangements for surgery at the soonest available time once the patient has achieved medical clearance for surgery.  An anterior cervical discectomy and fusion at C5-6 and C6-7 using a zero profile construct as planned.\par \par Thank you for very kindly including me in the evaluation and support of your patient.  Please do not hesitate to contact me should you have any questions or concerns regarding this evaluation, the patient's diagnosis of progressive cervical radiculopathy, or the recommendation for an anterior cervical discectomy and fusion at C5-6 and C6-7. [FreeTextEntry3] : Pj Martínez MD, PhD, FRCPSC\par Attending Neurosurgeon \par  of Neurosurgery \par [facility]\par [address] floor \[place] [address]\par Office: [phone]\par Fax: [phone]\par \par

## 2021-08-17 PROBLEM — R76.8 ANA POSITIVE: Status: ACTIVE | Noted: 2021-06-28

## 2021-08-17 PROBLEM — M89.49 PRIMARY OSTEOARTHRITIS INVOLVING MULTIPLE JOINTS: Status: ACTIVE | Noted: 2021-08-17

## 2021-08-17 PROBLEM — M25.561 KNEE PAIN, BILATERAL: Status: ACTIVE | Noted: 2021-06-28

## 2021-08-17 NOTE — PHYSICAL EXAM
[General Appearance - Alert] : alert [General Appearance - In No Acute Distress] : in no acute distress [Sclera] : the sclera and conjunctiva were normal [Outer Ear] : the ears and nose were normal in appearance [Oropharynx] : the oropharynx was normal [Neck Appearance] : the appearance of the neck was normal [Neck Cervical Mass (___cm)] : no neck mass was observed [Jugular Venous Distention Increased] : there was no jugular-venous distention [Thyroid Diffuse Enlargement] : the thyroid was not enlarged [Thyroid Nodule] : there were no palpable thyroid nodules [Auscultation Breath Sounds / Voice Sounds] : lungs were clear to auscultation bilaterally [Heart Rate And Rhythm] : heart rate was normal and rhythm regular [Heart Sounds] : normal S1 and S2 [Heart Sounds Gallop] : no gallops [Murmurs] : no murmurs [Heart Sounds Pericardial Friction Rub] : no pericardial rub [Bowel Sounds] : normal bowel sounds [Edema] : there was no peripheral edema [Abdomen Soft] : soft [Abdomen Tenderness] : non-tender [Abdomen Mass (___ Cm)] : no abdominal mass palpated [Cervical Lymph Nodes Enlarged Posterior Bilaterally] : posterior cervical [Cervical Lymph Nodes Enlarged Anterior Bilaterally] : anterior cervical [Supraclavicular Lymph Nodes Enlarged Bilaterally] : supraclavicular [Skin Color & Pigmentation] : normal skin color and pigmentation [Skin Turgor] : normal skin turgor [] : no rash [No Focal Deficits] : no focal deficits [Oriented To Time, Place, And Person] : oriented to person, place, and time [Impaired Insight] : insight and judgment were intact [Affect] : the affect was normal [FreeTextEntry1] : No synovitis;  (+)B/L Heberden's nodes;  (+)tenderness over B/L 1st CMC, left 1st MCP; B/L knees w/ (+)crepitus;  B/L hips w/ pain upon internal rotation;  rest of joints w/ normal ROM

## 2021-08-17 NOTE — HISTORY OF PRESENT ILLNESS
[FreeTextEntry1] : Feeling "the same" since last visit.  Still w/ joint pain, unchanged.  Also still w/ severe neck pain, unchanged.  No new complaints. [Anorexia] : no anorexia [Weight Loss] : no weight loss [Malaise] : no malaise [Fever] : no fever [Chills] : no chills [Fatigue] : no fatigue [Malar Facial Rash] : no malar facial rash [Skin Lesions] : no lesions [Skin Nodules] : no skin nodules [Oral Ulcers] : no oral ulcers [Cough] : no cough [Dry Mouth] : no dry mouth [Dysphonia] : no dysphonia [Dysphagia] : no dysphagia [Shortness of Breath] : no shortness of breath [Chest Pain] : no chest pain [Arthralgias] : no arthralgias [Joint Swelling] : no joint swelling [Joint Warmth] : no joint warmth [Joint Deformity] : no joint deformity [Decreased ROM] : no decreased range of motion [Morning Stiffness] : no morning stiffness [Falls] : no falls [Difficulty Standing] : no difficulty standing [Difficulty Walking] : no difficulty walking [Dyspnea] : no dyspnea [Myalgias] : no myalgias [Muscle Weakness] : no muscle weakness [Muscle Spasms] : no muscle spasms [Muscle Cramping] : no muscle cramping [Visual Changes] : no visual changes [Eye Pain] : no eye pain [Eye Redness] : no eye redness [Dry Eyes] : no dry eyes

## 2021-08-17 NOTE — ASSESSMENT
[FreeTextEntry1] : 61 year old female with:\par 1)  (+)RANI:  no obvious si/sx of CTD.  All sub-serologies negative\par 2)  Polyarticular joint pains:  due to OA\par   - Reiterated importance of exercise\par   - diclofenac and/or Tylenol prn\par   - warm compresses\par   - OTC topical analgesics \par 3)  Severe neck pain s/p prior surgery:  pt following w/ neurosurgery who is reportedly planning further surgery.\par

## 2022-01-31 ENCOUNTER — OUTPATIENT (OUTPATIENT)
Dept: OUTPATIENT SERVICES | Facility: HOSPITAL | Age: 62
LOS: 1 days | Discharge: HOME | End: 2022-01-31
Payer: MEDICARE

## 2022-01-31 VITALS
WEIGHT: 176.37 LBS | DIASTOLIC BLOOD PRESSURE: 86 MMHG | RESPIRATION RATE: 18 BRPM | TEMPERATURE: 98 F | HEIGHT: 67 IN | SYSTOLIC BLOOD PRESSURE: 121 MMHG | OXYGEN SATURATION: 98 % | HEART RATE: 82 BPM

## 2022-01-31 DIAGNOSIS — S93.114D: ICD-10-CM

## 2022-01-31 DIAGNOSIS — M79.671 PAIN IN RIGHT FOOT: ICD-10-CM

## 2022-01-31 DIAGNOSIS — M20.11 HALLUX VALGUS (ACQUIRED), RIGHT FOOT: ICD-10-CM

## 2022-01-31 DIAGNOSIS — Z01.818 ENCOUNTER FOR OTHER PREPROCEDURAL EXAMINATION: ICD-10-CM

## 2022-01-31 DIAGNOSIS — M20.41 OTHER HAMMER TOE(S) (ACQUIRED), RIGHT FOOT: ICD-10-CM

## 2022-01-31 LAB
ANION GAP SERPL CALC-SCNC: 3 MMOL/L — LOW (ref 5–17)
BASOPHILS # BLD AUTO: 0.03 K/UL — SIGNIFICANT CHANGE UP (ref 0–0.2)
BASOPHILS NFR BLD AUTO: 0.7 % — SIGNIFICANT CHANGE UP (ref 0–2)
BUN SERPL-MCNC: 26 MG/DL — HIGH (ref 7–23)
CALCIUM SERPL-MCNC: 9.5 MG/DL — SIGNIFICANT CHANGE UP (ref 8.5–10.1)
CHLORIDE SERPL-SCNC: 106 MMOL/L — SIGNIFICANT CHANGE UP (ref 96–108)
CO2 SERPL-SCNC: 28 MMOL/L — SIGNIFICANT CHANGE UP (ref 22–31)
CREAT SERPL-MCNC: 0.88 MG/DL — SIGNIFICANT CHANGE UP (ref 0.5–1.3)
EOSINOPHIL # BLD AUTO: 0.18 K/UL — SIGNIFICANT CHANGE UP (ref 0–0.5)
EOSINOPHIL NFR BLD AUTO: 4.2 % — SIGNIFICANT CHANGE UP (ref 0–6)
GLUCOSE SERPL-MCNC: 102 MG/DL — HIGH (ref 70–99)
HCT VFR BLD CALC: 34.6 % — SIGNIFICANT CHANGE UP (ref 34.5–45)
HGB BLD-MCNC: 12 G/DL — SIGNIFICANT CHANGE UP (ref 11.5–15.5)
IMM GRANULOCYTES NFR BLD AUTO: 0.5 % — SIGNIFICANT CHANGE UP (ref 0–1.5)
LYMPHOCYTES # BLD AUTO: 1.34 K/UL — SIGNIFICANT CHANGE UP (ref 1–3.3)
LYMPHOCYTES # BLD AUTO: 31.5 % — SIGNIFICANT CHANGE UP (ref 13–44)
MCHC RBC-ENTMCNC: 32.6 PG — SIGNIFICANT CHANGE UP (ref 27–34)
MCHC RBC-ENTMCNC: 34.7 G/DL — SIGNIFICANT CHANGE UP (ref 32–36)
MCV RBC AUTO: 94 FL — SIGNIFICANT CHANGE UP (ref 80–100)
MONOCYTES # BLD AUTO: 0.49 K/UL — SIGNIFICANT CHANGE UP (ref 0–0.9)
MONOCYTES NFR BLD AUTO: 11.5 % — SIGNIFICANT CHANGE UP (ref 2–14)
NEUTROPHILS # BLD AUTO: 2.2 K/UL — SIGNIFICANT CHANGE UP (ref 1.8–7.4)
NEUTROPHILS NFR BLD AUTO: 51.6 % — SIGNIFICANT CHANGE UP (ref 43–77)
NRBC # BLD: 0 /100 WBCS — SIGNIFICANT CHANGE UP (ref 0–0)
PLATELET # BLD AUTO: 268 K/UL — SIGNIFICANT CHANGE UP (ref 150–400)
POTASSIUM SERPL-MCNC: 4 MMOL/L — SIGNIFICANT CHANGE UP (ref 3.5–5.3)
POTASSIUM SERPL-SCNC: 4 MMOL/L — SIGNIFICANT CHANGE UP (ref 3.5–5.3)
RBC # BLD: 3.68 M/UL — LOW (ref 3.8–5.2)
RBC # FLD: 12.7 % — SIGNIFICANT CHANGE UP (ref 10.3–14.5)
SODIUM SERPL-SCNC: 137 MMOL/L — SIGNIFICANT CHANGE UP (ref 135–145)
WBC # BLD: 4.26 K/UL — SIGNIFICANT CHANGE UP (ref 3.8–10.5)
WBC # FLD AUTO: 4.26 K/UL — SIGNIFICANT CHANGE UP (ref 3.8–10.5)

## 2022-01-31 PROCEDURE — 93010 ELECTROCARDIOGRAM REPORT: CPT

## 2022-01-31 RX ORDER — GABAPENTIN 400 MG/1
1 CAPSULE ORAL
Qty: 0 | Refills: 0 | DISCHARGE

## 2022-01-31 NOTE — H&P PST ADULT - NSICDXPASTMEDICALHX_GEN_ALL_CORE_FT
PAST MEDICAL HISTORY:  HLD (hyperlipidemia)     HTN (hypertension)      PAST MEDICAL HISTORY:  Chronic neck pain     HLD (hyperlipidemia)     HTN (hypertension)

## 2022-01-31 NOTE — H&P PST ADULT - HISTORY OF PRESENT ILLNESS
60 yo female , pmh- htn, hld c/o painful right bunion and hammertoe - scheduled for right bunionectomy and hammertoe correction 2nd toe   denies recent travels in the past 30 days. No fever, SOB, cough, flu like symptoms or body rash- covid screen  covid vaccine completed

## 2022-01-31 NOTE — H&P PST ADULT - PROBLEM/PLAN-2
Able to visualize and assess an entire feeding. Mom positions and latches babe well ind. Once latched good sucking/swallowing noted. Latch score 9/10. Mom c/o bilateral nipple tenderness. Discussed ensuring babe obtains a deep latch, how to properly unlatch babe, how often to feed, how to tell babe is getting enough, burping techniques and early feeding cues. Encouraged mom to use lanolin and colostrum to sore nipples. Upon inspection no break down noted and no distortion to nipple present after feeding. Dicussed hand expression and use of pump. States understanding and denies any question or concerns.    DISPLAY PLAN FREE TEXT

## 2022-01-31 NOTE — H&P PST ADULT - NSICDXPASTSURGICALHX_GEN_ALL_CORE_FT
PAST SURGICAL HISTORY:  H/O elbow surgery right    H/O foot surgery b/l    H/O shoulder surgery right    Neck pain with history of cervical spinal surgery x2    Pituitary mass 30 y ears ago

## 2022-01-31 NOTE — H&P PST ADULT - ASSESSMENT
right bunion and hammer toes  CAPRINI SCORE    AGE RELATED RISK FACTORS                                                       MOBILITY RELATED FACTORS  [ ] Age 41-60 years                                            (1 Point)                  [ ] Bed rest                                                        (1 Point)  [x ] Age: 61-74 years                                           (2 Points)                [ ] Plaster cast                                                   (2 Points)  [ ] Age= 75 years                                              (3 Points)                 [ ] Bed bound for more than 72 hours                   (2 Points)    DISEASE RELATED RISK FACTORS                                               GENDER SPECIFIC FACTORS  [ ] Edema in the lower extremities                       (1 Point)                  [ ] Pregnancy                                                     (1 Point)  [ ] Varicose veins                                               (1 Point)                  [ ] Post-partum < 6 weeks                                   (1 Point)             [ x] BMI > 25 Kg/m2                                            (1 Point)                  [ ] Hormonal therapy  or oral contraception            (1 Point)                 [ ] Sepsis (in the previous month)                        (1 Point)                  [ ] History of pregnancy complications  [ ] Pneumonia or serious lung disease                                               [ ] Unexplained or recurrent                       (1 Point)           (in the previous month)                               (1 Point)  [ ] Abnormal pulmonary function test                     (1 Point)                 SURGERY RELATED RISK FACTORS  [ ] Acute myocardial infarction                              (1 Point)                 [ ]  Section                                            (1 Point)  [ ] Congestive heart failure (in the previous month)  (1 Point)                 [ ] Minor surgery                                                 (1 Point)   [ ] Inflammatory bowel disease                             (1 Point)                 [x ] Arthroscopic surgery                                        (2 Points)  [ ] Central venous access                                    (2 Points)                [ ] General surgery lasting more than 45 minutes   (2 Points)       [ ] Stroke (in the previous month)                          (5 Points)               [ ] Elective arthroplasty                                        (5 Points)                                                                                                                                               HEMATOLOGY RELATED FACTORS                                                 TRAUMA RELATED RISK FACTORS  [ ] Prior episodes of VTE                                     (3 Points)                 [ ] Fracture of the hip, pelvis, or leg                       (5 Points)  [ ] Positive family history for VTE                         (3 Points)                 [ ] Acute spinal cord injury (in the previous month)  (5 Points)  [ ] Prothrombin 73375 A                                      (3 Points)                 [ ] Paralysis  (less than 1 month)                          (5 Points)  [ ] Factor V Leiden                                             (3 Points)                 [ ] Multiple Trauma within 1 month                         (5 Points)  [ ] Lupus anticoagulants                                     (3 Points)                                                           [ ] Anticardiolipin antibodies                                (3 Points)                                                       [ ] High homocysteine in the blood                      (3 Points)                                             [ ] Other congenital or acquired thrombophilia       (3 Points)                                                [ ] Heparin induced thrombocytopenia                  (3 Points)                                          Total Score [     5     ]  Caprini Score 0-2: Low risk, No VTE Prophylaxis required for most patient's, encourage ambulation  Caprini Score 3-6: At Risk, Pharmacologic VTE prophylaxis is indicated for most patients ( in the absence of a contraindication)  Caprini Score Greater than or = 7: High Risk , pharmacologic VTE is indicated for most patients ( in the absence of a contraindication)    Caprini score indicates that the patient is high risk for VTE event ( score 6 or greater). Surgical patient's in this group will benefit from both pharmacologic prophylaxis and intermittent compression devices . Surgical team will determine the balance between VTE  risk and bleeding risk and other clinical considerations

## 2022-02-01 DIAGNOSIS — E78.5 HYPERLIPIDEMIA, UNSPECIFIED: ICD-10-CM

## 2022-02-01 DIAGNOSIS — Z01.818 ENCOUNTER FOR OTHER PREPROCEDURAL EXAMINATION: ICD-10-CM

## 2022-02-01 DIAGNOSIS — I10 ESSENTIAL (PRIMARY) HYPERTENSION: ICD-10-CM

## 2022-02-01 DIAGNOSIS — M54.2 CERVICALGIA: ICD-10-CM

## 2022-02-01 DIAGNOSIS — Z98.890 OTHER SPECIFIED POSTPROCEDURAL STATES: Chronic | ICD-10-CM

## 2022-02-01 DIAGNOSIS — M54.2 CERVICALGIA: Chronic | ICD-10-CM

## 2022-02-01 DIAGNOSIS — E23.6 OTHER DISORDERS OF PITUITARY GLAND: Chronic | ICD-10-CM

## 2022-02-01 DIAGNOSIS — M21.611 BUNION OF RIGHT FOOT: ICD-10-CM

## 2022-02-07 ENCOUNTER — APPOINTMENT (OUTPATIENT)
Dept: NEUROSURGERY | Facility: CLINIC | Age: 62
End: 2022-02-07

## 2022-03-16 ENCOUNTER — APPOINTMENT (OUTPATIENT)
Dept: NEUROSURGERY | Facility: CLINIC | Age: 62
End: 2022-03-16

## 2022-04-11 ENCOUNTER — EMERGENCY (EMERGENCY)
Facility: HOSPITAL | Age: 62
LOS: 1 days | Discharge: HOME | End: 2022-04-11
Attending: INTERNAL MEDICINE | Admitting: INTERNAL MEDICINE
Payer: MEDICARE

## 2022-04-11 VITALS
DIASTOLIC BLOOD PRESSURE: 79 MMHG | RESPIRATION RATE: 19 BRPM | TEMPERATURE: 98 F | SYSTOLIC BLOOD PRESSURE: 132 MMHG | WEIGHT: 179.9 LBS | OXYGEN SATURATION: 96 % | HEART RATE: 109 BPM

## 2022-04-11 DIAGNOSIS — Z98.890 OTHER SPECIFIED POSTPROCEDURAL STATES: Chronic | ICD-10-CM

## 2022-04-11 DIAGNOSIS — E23.6 OTHER DISORDERS OF PITUITARY GLAND: Chronic | ICD-10-CM

## 2022-04-11 DIAGNOSIS — M54.2 CERVICALGIA: Chronic | ICD-10-CM

## 2022-04-11 PROCEDURE — 12001 RPR S/N/AX/GEN/TRNK 2.5CM/<: CPT

## 2022-04-11 PROCEDURE — 99283 EMERGENCY DEPT VISIT LOW MDM: CPT | Mod: 25

## 2022-04-11 PROCEDURE — 73140 X-RAY EXAM OF FINGER(S): CPT

## 2022-04-11 PROCEDURE — 73140 X-RAY EXAM OF FINGER(S): CPT | Mod: 26,LT

## 2022-04-11 RX ORDER — CEPHALEXIN 500 MG
500 CAPSULE ORAL ONCE
Refills: 0 | Status: COMPLETED | OUTPATIENT
Start: 2022-04-11 | End: 2022-04-11

## 2022-04-11 RX ORDER — OXYCODONE AND ACETAMINOPHEN 5; 325 MG/1; MG/1
1 TABLET ORAL
Qty: 12 | Refills: 0
Start: 2022-04-11 | End: 2022-04-13

## 2022-04-11 RX ORDER — IBUPROFEN 200 MG
600 TABLET ORAL ONCE
Refills: 0 | Status: COMPLETED | OUTPATIENT
Start: 2022-04-11 | End: 2022-04-11

## 2022-04-11 RX ORDER — CEPHALEXIN 500 MG
1 CAPSULE ORAL
Qty: 10 | Refills: 0
Start: 2022-04-11 | End: 2022-04-15

## 2022-04-11 RX ADMIN — Medication 600 MILLIGRAM(S): at 20:23

## 2022-04-11 RX ADMIN — Medication 500 MILLIGRAM(S): at 19:57

## 2022-04-11 NOTE — ED PROVIDER NOTE - OBJECTIVE STATEMENT
60 yo female with a medical history including HTN & HLD, complaining of cutting the tip of her left index finger with a knife while cutting cabbage at home just prior to arrival, now with pain and numbness at the fingertip. Right-Hand Dominant, last tetanus booster was approx. 3 years ago. Denies any other injury.

## 2022-04-11 NOTE — ED PROVIDER NOTE - ATTENDING CONTRIBUTION TO CARE
62 yo female with a medical history including HTN & HLD, complaining of cutting the tip of her left index finger with a knife while cutting cabbage at home just prior to arrival, now with pain and numbness at the fingertip. Right-Hand Dominant, last tetanus booster was approx. 3 years ago. Denies any other injury.    Xray to look for open tuft fracture; negative for bony involvement.    Digital block performed and copious irrigation done; no visible foreign body or bone.   Prophalaxis with Keflex.    TXA, surgicell applied, then bulky pressure dressing did not stop the bleeding   pt finger was anesthetized again , using 5-0 vicryl sutured in to the wound to ligate the bleeders and dremabond , and surgicele reapplied which stopped the bleeding  Dr. Kaiser:  I have reviewed and discussed with the PA/ resident the case specifics, including the history, physical assessment, evaluation, conclusion, laboratory results, and medical plan. I agree with the contents, and conclusions. I have personally examined, and interviewed the patient.

## 2022-04-11 NOTE — ED PROVIDER NOTE - CARE PROVIDER_API CALL
Dayton Burnett)  Plastic Surgery  [address], [address]  [place] [address]  Phone: [phone]  Fax: [phone]  Follow Up Time:     Ralph Blum)  Plastic Surgery  [address]  [place] [address]  Phone: [phone]  Fax: [phone]  Follow Up Time:

## 2022-04-11 NOTE — ED PROVIDER NOTE - CLINICAL SUMMARY MEDICAL DECISION MAKING FREE TEXT BOX
Last tetanus 3 yrs ago. Last tetanus 3 yrs ago.  RH Dominant    TXA, surgicell applied. 60 yo female with a mdeical history including HTN & HLD, complaining of cutting the tip of her left index finger with a knife while cutting cabbage at home just prior to arrival, now with pain and numbness at the fingertip. Right-Hand Dominant, last tetanus booster was approx. 3 years ago. Denies any other injury.    Xray to look for open tuft fracture; negative for bony involvement.    Digital block performed and copious irrigation donel; no visible foreign body or bone.   Prophalaxis with Keflex.    TXA, surgicell applied, then bulky pressure dressing and finger splint. 62 yo female with a medical history including HTN & HLD, complaining of cutting the tip of her left index finger with a knife while cutting cabbage at home just prior to arrival, now with pain and numbness at the fingertip. Right-Hand Dominant, last tetanus booster was approx. 3 years ago. Denies any other injury.    Xray to look for open tuft fracture; negative for bony involvement.    Digital block performed and copious irrigation done; no visible foreign body or bone.   Prophalaxis with Keflex.    TXA, surgicell applied, then bulky pressure dressing and finger splint.

## 2022-04-11 NOTE — ED ADULT NURSE NOTE - OBJECTIVE STATEMENT
Pt presents to ED from home for avulsion of left index finger. Pt states she was using a  knife when she cut the tip of her finger. Moderate bleeding, DSD in place, arm elevated.

## 2022-04-11 NOTE — ED PROVIDER NOTE - PATIENT PORTAL LINK FT
You can access the FollowMyHealth Patient Portal offered by [facility] by registering at the following website: [email or web address]. By joining [employer]’s FollowMyHealth portal, you will also be able to view your health information using other applications (apps) compatible with our system.

## 2022-04-11 NOTE — ED PROVIDER NOTE - NSFOLLOWUPINSTRUCTIONS_ED_ALL_ED_FT
Traumatic Finger Amputation      A traumatic finger amputation is when a person loses part or all of a finger because of an accident or injury. This condition is a medical emergency. It needs to be treated right away to prevent more damage to the finger and to save the lost part of the finger, if that is possible.      What are the causes?    This condition usually results from an accident that involves:  •A car.      •Power tools.      •Factory work.      •Farm or lawn equipment.        What are the signs or symptoms?    Symptoms of this condition include:  •Bleeding.      •Pain.      •Damage to surrounding tissues, such as bones, muscles, tendons, and skin.        How is this diagnosed?    This condition is diagnosed with a physical exam. During the exam, your health care provider will determine how severe the injury is and the best way to treat it. X-rays may be done to check for damage to the surrounding bones and tissues.      How is this treated?     This condition is treated by cleaning the wound thoroughly and with medicines for pain. Additional treatment depends on the type of injury that you have and how bad it is:    •If only the tip of your finger was removed, treatment may involve placing a protective bandage (dressing) over the wound and cleaning the wound regularly.      •If the injury is severe, a portion of skin may be taken from another part of the body (graft) and attached to the wound site until the wound heals.      •If a large portion of the finger was cut off, treatment may include a surgical procedure to reattach the finger (replantation).        Follow these instructions at home:    Medicines     •Take over-the-counter and prescription medicines only as told by your health care provider.      •If you were prescribed an antibiotic medicine, use it as told by your health care provider. Do not stop using the antibiotic even if your condition improves.      • Do not drive or use heavy machinery while taking prescription pain medicine.      Wound care   •Follow instructions from your health care provider about how to take care of your wound. Make sure you:  •Wash your hands with soap and water before you change your dressing. If soap and water are not available, use hand .      •Change your dressing as told by your health care provider.      •Leave stitches (sutures), skin glue, or adhesive strips in place. These skin closures may need to stay in place for 2 weeks or longer. If adhesive strip edges start to loosen and curl up, you may trim the loose edges. Do not remove adhesive strips completely unless your health care provider tells you to do that.      •Check your wound every day for signs of infection. Check for:  •Redness, swelling, or pain.    •Fluid or blood.    •Warmth.      •Pus or a bad smell.      General instructions     •Do exercises to strengthen your finger and hand as directed by your health care provider.    •Keep your hand raised above the level of your heart when resting.      Contact a health care provider if:    •Your wound does not seem to be healing well.    •You have redness, swelling, or pain around your wound.    •You have fluid or blood coming from your wound.    •Your wound feels warm to the touch.    •You have pus or a bad smell coming from your wound.      •You have a fever.      Get help right away if:    •You have redness spreading or extending from your wound.      Summary    •A traumatic finger amputation is when a person loses part or all of a finger because of an accident or injury.    •This condition is diagnosed with a physical exam. During the exam your health care provider will determine how severe the injury is and the best way to treat it.    •Follow instructions from your health care provider about how to take care of your wound.      This information is not intended to replace advice given to you by your health care provider. Make sure you discuss any questions you have with your health care provider. Follow up with a Hand Surgeon.    Keep dressing clean and dry.    Take antibiotics as prescribed.    Take motrin as much as 600mg every 6 hours with food for moderate pain.    Take percocet as prescribed for severe pain.    Return to ED for any concerns.    Traumatic Finger Amputation      A traumatic finger amputation is when a person loses part or all of a finger because of an accident or injury. This condition is a medical emergency. It needs to be treated right away to prevent more damage to the finger and to save the lost part of the finger, if that is possible.      What are the causes?    This condition usually results from an accident that involves:  •A car.      •Power tools.      •Factory work.      •Farm or lawn equipment.        What are the signs or symptoms?    Symptoms of this condition include:  •Bleeding.    •Pain.    •Damage to surrounding tissues, such as bones, muscles, tendons, and skin.      How is this diagnosed?    This condition is diagnosed with a physical exam. During the exam, your health care provider will determine how severe the injury is and the best way to treat it. X-rays may be done to check for damage to the surrounding bones and tissues.      How is this treated?     This condition is treated by cleaning the wound thoroughly and with medicines for pain. Additional treatment depends on the type of injury that you have and how bad it is:    •If only the tip of your finger was removed, treatment may involve placing a protective bandage (dressing) over the wound and cleaning the wound regularly.      •If the injury is severe, a portion of skin may be taken from another part of the body (graft) and attached to the wound site until the wound heals.      •If a large portion of the finger was cut off, treatment may include a surgical procedure to reattach the finger (replantation).        Follow these instructions at home:    Medicines     •Take over-the-counter and prescription medicines only as told by your health care provider.      •If you were prescribed an antibiotic medicine, use it as told by your health care provider. Do not stop using the antibiotic even if your condition improves.      • Do not drive or use heavy machinery while taking prescription pain medicine.      Wound care   •Follow instructions from your health care provider about how to take care of your wound. Make sure you:  •Wash your hands with soap and water before you change your dressing. If soap and water are not available, use hand .      •Change your dressing as told by your health care provider.      •Leave stitches (sutures), skin glue, or adhesive strips in place. These skin closures may need to stay in place for 2 weeks or longer. If adhesive strip edges start to loosen and curl up, you may trim the loose edges. Do not remove adhesive strips completely unless your health care provider tells you to do that.      •Check your wound every day for signs of infection. Check for:  •Redness, swelling, or pain.    •Fluid or blood.    •Warmth.      •Pus or a bad smell.      General instructions     •Do exercises to strengthen your finger and hand as directed by your health care provider.    •Keep your hand raised above the level of your heart when resting.      Contact a health care provider if:    •Your wound does not seem to be healing well.    •You have redness, swelling, or pain around your wound.    •You have fluid or blood coming from your wound.    •Your wound feels warm to the touch.    •You have pus or a bad smell coming from your wound.      •You have a fever.      Get help right away if:    •You have redness spreading or extending from your wound.      Summary    •A traumatic finger amputation is when a person loses part or all of a finger because of an accident or injury.    •This condition is diagnosed with a physical exam. During the exam your health care provider will determine how severe the injury is and the best way to treat it.    •Follow instructions from your health care provider about how to take care of your wound.      This information is not intended to replace advice given to you by your health care provider. Make sure you discuss any questions you have with your health care provider.

## 2022-04-11 NOTE — ED PROVIDER NOTE - NS ED ATTENDING STATEMENT MOD
This was a shared visit with the ANTONIO. I reviewed and verified the documentation and independently performed the documented:

## 2022-04-11 NOTE — ED PROVIDER NOTE - SKIN AREA #1
Avulsed/amputated approx. 1/3 of distal tip of left 2nd distal phalanx radial aspect, including segment of nail, no visible bone or muscle, no foreign body./distal/lateral

## 2022-04-11 NOTE — ED PROVIDER NOTE - UPPER EXTREMITY EXAM, LEFT
Left 2nd finger distal phalanx swelling, tenderness. FROM, flexion and extension strength intact./SWELLING/TENDERNESS

## 2022-04-12 PROBLEM — M54.2 CERVICALGIA: Chronic | Status: ACTIVE | Noted: 2022-02-01

## 2022-04-13 ENCOUNTER — APPOINTMENT (OUTPATIENT)
Dept: ORTHOPEDIC SURGERY | Facility: CLINIC | Age: 62
End: 2022-04-13
Payer: MEDICARE

## 2022-04-13 VITALS — HEIGHT: 67 IN | BODY MASS INDEX: 25.27 KG/M2 | WEIGHT: 161 LBS

## 2022-04-13 DIAGNOSIS — S61.311A LACERATION W/OUT FOREIGN BODY OF LEFT INDEX FINGER WITH DAMAGE TO NAIL, INITIAL ENCOUNTER: ICD-10-CM

## 2022-04-13 PROCEDURE — 99214 OFFICE O/P EST MOD 30 MIN: CPT

## 2022-04-13 NOTE — REASON FOR VISIT
[FreeTextEntry2] : LT index Finger. Pt sliced her finger with a kitchen knife. Pt wounds needed to be stitched closed

## 2022-04-13 NOTE — HISTORY OF PRESENT ILLNESS
[5] : 5 [Dull/Aching] : dull/aching [Constant] : constant [de-identified] : 4/13/2022: LT index Finger. Pt sliced her finger with a kitchen knife, Pt wounds needed to be stitched closed\par \par RHD [] : no [FreeTextEntry1] : LT index finger

## 2022-04-13 NOTE — PHYSICAL EXAM
[de-identified] : left index finger:\par laceration on tip of the finger\par stiffness of finger, otherwise FAROM\par NVID\par swelling and ecchymosis\par ttp over tip of the finger\par

## 2022-07-26 NOTE — CONSULT LETTER
[Dear  ___] : Dear  [unfilled], [Consult Letter:] : I had the pleasure of evaluating your patient, [unfilled]. [Please see my note below.] : Please see my note below. [Consult Closing:] : Thank you very much for allowing me to participate in the care of this patient.  If you have any questions, please do not hesitate to contact me. [Sincerely,] : Sincerely, [FreeTextEntry3] : Benedict Cee MD\par Rheumatology\par [facility]\par  of Medicine\par [facility] \par \par [address]\[place] [address]\par \par [address] [facility]\[place] [address]\par \par [address] [address]\par \par phone:  [phone]\par fax:      [phone]\par  Negative

## 2022-08-30 PROBLEM — E78.5 HYPERLIPIDEMIA, UNSPECIFIED: Chronic | Status: ACTIVE | Noted: 2022-04-11

## 2022-08-30 PROBLEM — I10 ESSENTIAL (PRIMARY) HYPERTENSION: Chronic | Status: ACTIVE | Noted: 2022-04-11

## 2022-09-14 ENCOUNTER — APPOINTMENT (OUTPATIENT)
Dept: UROGYNECOLOGY | Facility: CLINIC | Age: 62
End: 2022-09-14

## 2022-09-14 VITALS
DIASTOLIC BLOOD PRESSURE: 90 MMHG | HEIGHT: 67 IN | BODY MASS INDEX: 25.11 KG/M2 | WEIGHT: 160 LBS | TEMPERATURE: 96.8 F | SYSTOLIC BLOOD PRESSURE: 128 MMHG

## 2022-09-14 DIAGNOSIS — R31.9 HEMATURIA, UNSPECIFIED: ICD-10-CM

## 2022-09-14 LAB
BILIRUB UR QL STRIP: NORMAL
CLARITY UR: CLEAR
COLLECTION METHOD: NORMAL
GLUCOSE UR-MCNC: NORMAL
HCG UR QL: 0.2 EU/DL
HGB UR QL STRIP.AUTO: NORMAL
KETONES UR-MCNC: NORMAL
LEUKOCYTE ESTERASE UR QL STRIP: NORMAL
NITRITE UR QL STRIP: NORMAL
PH UR STRIP: 7
PROT UR STRIP-MCNC: NORMAL
SP GR UR STRIP: 1.02

## 2022-09-14 PROCEDURE — 99205 OFFICE O/P NEW HI 60 MIN: CPT | Mod: 25

## 2022-09-14 PROCEDURE — 51701 INSERT BLADDER CATHETER: CPT

## 2022-09-14 RX ORDER — ESTRADIOL 0.1 MG/G
0.1 CREAM VAGINAL
Qty: 1 | Refills: 3 | Status: ACTIVE | COMMUNITY
Start: 2022-09-14 | End: 1900-01-01

## 2022-09-14 NOTE — PROCEDURE
[Straight Catheterization] : insertion of a straight catheter [Urinary Retention] : urinary retention [___ Fr Straight Tip] : a [unfilled] in [place] straight tip catheter [Clear] : clear [Culture] : culture [Urinalysis] : urinalysis [None] : none [FreeTextEntry1] :

## 2022-09-14 NOTE — PHYSICAL EXAM
[No Acute Distress] : in no acute distress [Well developed] : well developed [Oriented x3] : oriented to person, place, and time [Normal Mood/Affect] : mood and affect are normal [Respirations regular] : ~T respiratory rate was regular [No Edema] : ~T edema was not present [Warm and Dry] : was warm and dry to touch [Vulvar Atrophy] : vulvar atrophy [Labia Majora] : were normal [Labia Minora] : were normal [Atrophy] : atrophy [Dry Mucosa] : dry mucosa [Attentuated] : perineal body was attenuated [Aa ____] : Aa [unfilled] [Ba ____] : Ba [unfilled] [C ____] : C [unfilled] [GH ____] : GH [unfilled] [PB ____] : PB [unfilled] [TVL ____] : TVL  [unfilled] [Ap ____] : Ap [unfilled] [Bp ____] : Bp [unfilled] [D ____] : D [unfilled] [] : III [Normal Position] : in a normal position [Soft] :  the cervix was soft [Uterine Adnexae] : were not tender and not enlarged [Normal] : no abnormalities [Post Void Residual ____ml] : post void residual was [unfilled] ml [Exam Deferred] : was deferred [Mass (___ Cm)] : no ~M [unfilled] abdominal mass was palpated [Tenderness] : ~T no ~M abdominal tenderness observed [Distended] : not distended

## 2022-09-14 NOTE — DISCUSSION/SUMMARY
[Discuss Alternatives/Risks/Benefits w/Patient] : All alternatives, risks, and benefits were discussed with the patient/family and all questions were answered.  Patient expressed good understanding and appreciates the importance of follow up as recommended. [Face to Face Time___ Minutes] : with [unfilled] minutes in face to face consultation. [FreeTextEntry1] : #POP\par - I counseled the patient on etiology and management alternative including: doing nothing, pelvic floor exercises, pessary placement, surgical options (native tissue repair vs. graft augmented reconstructive surgery. Patient is interested in surgical procedures. \par - Will follow-up for urodynamic testing to r/o occult CATRACHITA\par - Will follow-up with PCP for medical clearance\par - Will obtain pelvic US\par \par #Vaginal atrophy\par - I counseled patient on vaginal estrogen therapy and instructions were given for application nightly x 2 weeks and 2X per week thereafter\par \par #Blood in urine\par - F/u UA and UCx

## 2022-09-14 NOTE — HISTORY OF PRESENT ILLNESS
[FreeTextEntry1] : Joleen Qiu is a 63 yo with HTN, HLD here for evaluation of prolapse. Reports that her symptoms started in June - notes that it "looked like bulging ball" that feels very irritated and comes out after a long day. Has been having some difficulty emptying her  bladder because of this and often has to strain. Does not feel like she empties well. Reports some stinging with urination, no pain. No issues with constipation. Denies urgency urinary incontinence other than in the morning when she first wakes up. No stress urinary incontinence. She wants to get this fixed as soon as possible. Patient works at [employer] and does a lot of heavy lifting. She sadly recently lost her son, reports depression and unfortunately has been having a hard time getting an appointment with therapist but feel that she is recovering.

## 2022-09-15 DIAGNOSIS — Z86.59 PERSONAL HISTORY OF OTHER MENTAL AND BEHAVIORAL DISORDERS: ICD-10-CM

## 2022-09-16 ENCOUNTER — NON-APPOINTMENT (OUTPATIENT)
Age: 62
End: 2022-09-16

## 2022-09-16 LAB — BACTERIA UR CULT: NORMAL

## 2022-09-20 ENCOUNTER — APPOINTMENT (OUTPATIENT)
Dept: ULTRASOUND IMAGING | Facility: CLINIC | Age: 62
End: 2022-09-20

## 2022-09-20 ENCOUNTER — NON-APPOINTMENT (OUTPATIENT)
Age: 62
End: 2022-09-20

## 2022-09-20 PROCEDURE — 93971 EXTREMITY STUDY: CPT | Mod: LT

## 2022-09-20 PROCEDURE — 76830 TRANSVAGINAL US NON-OB: CPT

## 2022-09-29 LAB
APPEARANCE: CLEAR
BACTERIA: NEGATIVE
BILIRUBIN URINE: NEGATIVE
BLOOD URINE: NEGATIVE
COLOR: YELLOW
GLUCOSE QUALITATIVE U: NEGATIVE
HYALINE CASTS: 3 /LPF
KETONES URINE: NEGATIVE
LEUKOCYTE ESTERASE URINE: NEGATIVE
MICROSCOPIC-UA: NORMAL
NITRITE URINE: NEGATIVE
PH URINE: 7
PROTEIN URINE: NORMAL
RED BLOOD CELLS URINE: 4 /HPF
SPECIFIC GRAVITY URINE: 1.03
SQUAMOUS EPITHELIAL CELLS: 2 /HPF
UROBILINOGEN URINE: NORMAL
WHITE BLOOD CELLS URINE: 1 /HPF

## 2022-10-19 ENCOUNTER — OUTPATIENT (OUTPATIENT)
Dept: OUTPATIENT SERVICES | Facility: HOSPITAL | Age: 62
LOS: 1 days | End: 2022-10-19
Payer: MEDICARE

## 2022-10-19 ENCOUNTER — APPOINTMENT (OUTPATIENT)
Dept: UROGYNECOLOGY | Facility: CLINIC | Age: 62
End: 2022-10-19

## 2022-10-19 VITALS — DIASTOLIC BLOOD PRESSURE: 79 MMHG | HEART RATE: 73 BPM | SYSTOLIC BLOOD PRESSURE: 128 MMHG

## 2022-10-19 DIAGNOSIS — Z98.890 OTHER SPECIFIED POSTPROCEDURAL STATES: Chronic | ICD-10-CM

## 2022-10-19 DIAGNOSIS — Z01.818 ENCOUNTER FOR OTHER PREPROCEDURAL EXAMINATION: ICD-10-CM

## 2022-10-19 DIAGNOSIS — M54.2 CERVICALGIA: Chronic | ICD-10-CM

## 2022-10-19 DIAGNOSIS — E23.6 OTHER DISORDERS OF PITUITARY GLAND: Chronic | ICD-10-CM

## 2022-10-19 LAB
BILIRUB UR QL STRIP: NEGATIVE
CLARITY UR: CLEAR
COLLECTION METHOD: NORMAL
GLUCOSE UR-MCNC: NEGATIVE
HCG UR QL: 0.2 EU/DL
HGB UR QL STRIP.AUTO: NORMAL
KETONES UR-MCNC: NEGATIVE
LEUKOCYTE ESTERASE UR QL STRIP: NEGATIVE
NITRITE UR QL STRIP: NEGATIVE
PH UR STRIP: 5.5
PROT UR STRIP-MCNC: NEGATIVE
SP GR UR STRIP: 1.02

## 2022-10-19 PROCEDURE — 51784 ANAL/URINARY MUSCLE STUDY: CPT | Mod: 26

## 2022-10-19 PROCEDURE — 51797 INTRAABDOMINAL PRESSURE TEST: CPT

## 2022-10-19 PROCEDURE — 51729 CYSTOMETROGRAM W/VP&UP: CPT | Mod: 26

## 2022-10-19 PROCEDURE — 51797 INTRAABDOMINAL PRESSURE TEST: CPT | Mod: 26

## 2022-10-19 PROCEDURE — 51784 ANAL/URINARY MUSCLE STUDY: CPT

## 2022-10-19 PROCEDURE — 51729 CYSTOMETROGRAM W/VP&UP: CPT

## 2022-10-20 LAB
APPEARANCE: CLEAR
BACTERIA: NEGATIVE
BILIRUBIN URINE: NEGATIVE
BLOOD URINE: NEGATIVE
COLOR: YELLOW
GLUCOSE QUALITATIVE U: NEGATIVE
HYALINE CASTS: 2 /LPF
KETONES URINE: NEGATIVE
LEUKOCYTE ESTERASE URINE: NEGATIVE
MICROSCOPIC-UA: NORMAL
NITRITE URINE: NEGATIVE
PH URINE: 5.5
PROTEIN URINE: NEGATIVE
RED BLOOD CELLS URINE: 1 /HPF
SPECIFIC GRAVITY URINE: 1.02
SQUAMOUS EPITHELIAL CELLS: 1 /HPF
UROBILINOGEN URINE: NORMAL
WHITE BLOOD CELLS URINE: 1 /HPF

## 2022-10-21 ENCOUNTER — NON-APPOINTMENT (OUTPATIENT)
Age: 62
End: 2022-10-21

## 2022-10-21 LAB — BACTERIA UR CULT: NORMAL

## 2022-10-24 ENCOUNTER — APPOINTMENT (OUTPATIENT)
Dept: UROGYNECOLOGY | Facility: CLINIC | Age: 62
End: 2022-10-24

## 2022-10-24 VITALS — TEMPERATURE: 97.6 F

## 2022-10-24 VITALS — TEMPERATURE: 96.6 F

## 2022-10-24 PROCEDURE — 99214 OFFICE O/P EST MOD 30 MIN: CPT

## 2022-10-25 NOTE — DISCUSSION/SUMMARY
[FreeTextEntry1] : 63 yo with Stage III POP. We reviewed risks/benefits and alternative management options again today. We reviewed difference between abdominal approach and vaginal approach. We reviewed difference between native tissue repair and graft augmented repair as well as risks of mesh erosion. We reviewed urodynamic results and management options including expectant management vs. concurrent anti-incontinence procedure with sling. She would prefer to have concurrent procedure. She is not interested in hysterectomy and thinks she would like to have a sacro-hysteropexy. Given significant reported bother from prolapse, I offered her a trial of pessary in the interim, she was agreeable to this. She will RTO in one week for pessary fitting as well as to finalize surgery plan in order to schedule procedure. She will continue vaginal estrogen.\par \par We also reviewed results of pelvic ultrasound, which showed an EMS of 2mm and simple fluid in cavity. Based on current literature we discussed that simple fluid in the setting of thin strip is considered within normal limits of US findings. Given absence of postmenopausal bleeding, no need for endometrial biopsy at this time.

## 2022-10-25 NOTE — PHYSICAL EXAM
[FreeTextEntry1] : General: Well, appearing. Alert and orientated. No acute distress\par HEENT: Normocephalic, atraumatic\par Respiratory: Speaking in full sentences comfortably, normal work of breathing and no cough during visit\par Extremities: No upper extremity edema noted\par Skin: No obvious rash or skin lesions\par Neuro: Orientated X 3, speech is fluent, normal rate\par Psych: Normal mood and affect

## 2022-10-25 NOTE — HISTORY OF PRESENT ILLNESS
[FreeTextEntry1] : 63 yo with stage III pelvic organ prolapse (anterior predominant) here for follow-up and further discussion of management. Reports continued discomfort and bother from prolapse. She underwent urodynamic testing on 10/19/22, which revealed evidence of occult CATRACHITA.

## 2022-10-26 ENCOUNTER — APPOINTMENT (OUTPATIENT)
Dept: NEUROSURGERY | Facility: CLINIC | Age: 62
End: 2022-10-26

## 2022-10-26 VITALS
HEART RATE: 72 BPM | OXYGEN SATURATION: 97 % | DIASTOLIC BLOOD PRESSURE: 78 MMHG | SYSTOLIC BLOOD PRESSURE: 122 MMHG | TEMPERATURE: 98.2 F | BODY MASS INDEX: 25.01 KG/M2 | HEIGHT: 67 IN | WEIGHT: 159.38 LBS

## 2022-10-26 DIAGNOSIS — R27.8 OTHER LACK OF COORDINATION: ICD-10-CM

## 2022-10-26 PROCEDURE — 99214 OFFICE O/P EST MOD 30 MIN: CPT

## 2022-11-03 ENCOUNTER — APPOINTMENT (OUTPATIENT)
Dept: UROGYNECOLOGY | Facility: CLINIC | Age: 62
End: 2022-11-03

## 2022-11-03 VITALS
SYSTOLIC BLOOD PRESSURE: 125 MMHG | DIASTOLIC BLOOD PRESSURE: 70 MMHG | BODY MASS INDEX: 23.23 KG/M2 | HEIGHT: 67 IN | WEIGHT: 148 LBS | TEMPERATURE: 97.2 F

## 2022-11-03 PROCEDURE — 57160 INSERT PESSARY/OTHER DEVICE: CPT

## 2022-11-14 ENCOUNTER — APPOINTMENT (OUTPATIENT)
Dept: NEUROSURGERY | Facility: CLINIC | Age: 62
End: 2022-11-14

## 2022-11-14 VITALS
HEIGHT: 67 IN | BODY MASS INDEX: 24.96 KG/M2 | OXYGEN SATURATION: 99 % | DIASTOLIC BLOOD PRESSURE: 82 MMHG | SYSTOLIC BLOOD PRESSURE: 137 MMHG | TEMPERATURE: 97.9 F | HEART RATE: 61 BPM | WEIGHT: 159 LBS

## 2022-11-14 DIAGNOSIS — M54.50 LOW BACK PAIN, UNSPECIFIED: ICD-10-CM

## 2022-11-14 DIAGNOSIS — M43.12 SPONDYLOLISTHESIS, CERVICAL REGION: ICD-10-CM

## 2022-11-14 PROCEDURE — 99214 OFFICE O/P EST MOD 30 MIN: CPT

## 2022-11-14 NOTE — REVIEW OF SYSTEMS
[Hand Weakness] :  hand weakness [Poor Coordination] : poor coordination [Difficulty Writing] : difficulty writing [Numbness] : numbness [Tingling] : tingling [Difficulty Walking] : difficulty walking [Negative] : Heme/Lymph

## 2022-11-14 NOTE — CONSULT LETTER
[Dear  ___] : Dear  [unfilled], [Courtesy Letter:] : I had the pleasure of seeing your patient, [unfilled], in my office today. [Sincerely,] : Sincerely, [FreeTextEntry2] : Desean Cornelius MD\par [address] [place] [address]\[place] [address] \par  [FreeTextEntry1] : Joleen Qiu is a 62-year-old female who presents today with cervical and lumbar symptoms. As you may recall, patient has extensive multifocal arthritis throughout the body.  Patient sustained a neck injury many years ago after a gymnastics accident which resulted in posterior C3-4 fusion.  She then developed adjacent segment instability which required further anterior cervical discectomy fusion at C4-5 performed in 2016.  Patient was last seen in our office in August of 2021.  At that time a C5 cervical discectomy and fusion was discussed for ongoing neck pain with cervicalgia and bilateral arm pain.  At that time she had underwent EMG study which also indicated radiculopathy at C5-6 and C6-7.\par \par Patient reports 8 out of 10 worsening sharp constant neck pain.  Patient reports constant burning and paresthesias to left entire upper extremity and hand and right entire arm.  PAtient endorses bilateral dexterity difficulties, weakness and dropping objects from hand, left greater than right.  Patient with bilateral arm shooting pains, left side greater than right.\par \par Patient endorses 7 out of 10 constant sharp lower back pain.  She denies any leg pain or lower extremity numbness tingling or weakness.  She reports right foot occasionally gets stuck with walking.  Patient denies bowel or bladder dysfunction or saddle anesthesia.\par \par Patient has been under the care of pain management since 2015. She was last seen one month ago. Patient currently takes gabapentin and norco. \par \par Patient is alert and oriented. No distress noted. 5/5 strength noted to bilateral upper and lower extremities.  Negative Krish's.  Negative clonus.  Reflexes to upper extremity present and equal.  Unable to elicit left patellar reflex and bilateral Achilles tendon reflexes.  Right patellar reflex present. \par \par I have provided the patient with a prescription for x-ray and MRI of the cervical and lumbar spine.  I have also provided patient with a referral for physical therapy.  Patient will follow-up in office to review images with neurosurgeon.  Patient aware to call with any further questions or concerns or with any new or worsening symptoms.\par  [FreeTextEntry3] : Bere Teresa, MSN, FNP-BC\par Nurse Practitioner\par Neurosurgery\par [address] [place] [address], [address]nd floor \[place] [address] \par Office: [phone] \par Fax: [phone]\par \par

## 2022-11-17 ENCOUNTER — APPOINTMENT (OUTPATIENT)
Dept: UROGYNECOLOGY | Facility: CLINIC | Age: 62
End: 2022-11-17

## 2022-11-17 PROCEDURE — 99212 OFFICE O/P EST SF 10 MIN: CPT

## 2022-11-17 PROCEDURE — A4562: CPT

## 2022-11-17 NOTE — DISCUSSION/SUMMARY
[FreeTextEntry1] : Refitted with #6 pessary, no discomfort. Will continue with this in the interim until surgery scheduling and will follow-up for pre-operative visit. Aware to call office with any questions/concerns.

## 2022-11-17 NOTE — HISTORY OF PRESENT ILLNESS
[FreeTextEntry1] : 61 yo with stage III POP fitted with #5 RWS last visit, awaiting surgical date. Reported some discomfort from pessary over the last 2 days and feels that the bladder is protruding beyond the pessary.

## 2022-11-17 NOTE — PROCEDURE
[Good Fit] : fits well [Refit] : refit needed [Erosion] : no evidence of erosion [Erythema] : no erythema [Discharge] : no vaginal discharge [Infection] : no evidence of infection [Pessary Inserted] : inserted [None] : no bleeding [FreeTextEntry1] : #6 RWS [de-identified] : refitted from #5 RWS

## 2022-11-21 ENCOUNTER — NON-APPOINTMENT (OUTPATIENT)
Age: 62
End: 2022-11-21

## 2022-12-19 PROBLEM — M43.12 ACQUIRED SPONDYLOLISTHESIS OF CERVICAL VERTEBRA: Status: ACTIVE | Noted: 2022-12-19

## 2022-12-19 NOTE — CONSULT LETTER
[Dear  ___] : Dear  [unfilled], [Courtesy Letter:] : I had the pleasure of seeing your patient, [unfilled], in my office today. [Sincerely,] : Sincerely, [FreeTextEntry2] : Desean Cornelius MD\par [address] [place] [address]\par [place] [id number]  [FreeTextEntry1] : Joleen Qiu is a 62-year-old female who presents today with progressive cervical and lumbar symptoms.   As you may recall, patient has extensive multifocal arthritis throughout the body.  Patient sustained a neck injury many years ago after a gymnastics accident which resulted in posterior C3-4 fusion.  She then developed adjacent segment instability which required further anterior cervical discectomy fusion at C4-5 performed in 2016.  Patient was last seen in our office in August of 2021.  At that time a C5 cervical discectomy and fusion was discussed for ongoing neck pain with cervicalgia and bilateral arm pain.  At that time she had underwent EMG study which also indicated radiculopathy at C5-6 and C6-7.  However, the patient opted to pursue non-surgical measures.  Today the patient is reporting significant progressive left sided cervical radiculopathy and lower back pain.   The patient is reporting neck pain with left arm burning dysesthetic pain which actually improves if she keeps her arm straight.  The pain is severe at times.   The patient has trouble walking and difficulty with coordination.  She denies any leg pain or lower extremity numbness tingling or weakness.  She reports right foot occasionally gets stuck with walking.  Patient denies bowel or bladder dysfunction or saddle anesthesia.   In addition, the patient is having surgery for a prolapsed uterus.  She continues to have lower back pain with occasional tripping of her right foot.    Left arm weakness is present.  No bowel or bladder functional changes.  No urine or stool incontinence.  \par \par The patient and I have reviewed her most recent MRI of the cervical spine from [facility] from 11/2022.   At C5-6 , C6- 7 there is notable spondylolisthesis with spinal cord deformity   A lumbar MRI from [facility] shows no scoliosis and proper alignment.    L3 -4 and L2 -3 on the left and L4 -5 on the right shows stenosis.   \par Patient is alert and oriented. No distress noted. 5/5 strength noted to bilateral upper and lower extremities.  There may be a slight weakness of her dorsiflexion muscle on the right foot.  Negative Krish's.  Negative clonus.  Reflexes to upper extremity present and equal.  Unable to elicit left patellar reflex and bilateral Achilles tendon reflexes.  \par \par The patient and I had a lengthy discussion about significant cervical spine disease with evidence of instability.  I have recommended corrective surgery and undergoing a cervical fusion and using a zero profile construct.   I have used internet images to review the zero profile construct and surgical technique .   The surgery was discussed in detail and risks related of surgery were discussed in detail - including but not limited to CSF leak, infection, nerve damage, temporary or permanent weakness or numbness, hemorrhage, cardiac events, and rarely even death, among others. Repair of CSF leak explained.  An explanation of hardware/instrumentation that will be placed in the spine was reviewed and understood.  Higher risk in view of previous surgery and post-op complications.  The patient will proceed with surgery after her uterine surgery in the early part of 2023.  She will return before neurosurgery is scheduled to discuss details again and a consent will be signed at this time.  All of her questions were answered to her satisfaction.  \par \par Thank you for very kindly including me in the evaluation and treatment of your patient.  Please do not hesitate to contact me should you have any concerns or questions regarding the proposed surgical and follow up plan.  [FreeTextEntry3] : Pj Martínez MD, PhD, FRCPSC \par Attending Neurosurgeon \par  of Neurosurgery \par [facility] \par [address], [address]nd floor \[place] [address] \par Office: [phone] \par Fax: [phone]\par \par

## 2022-12-19 NOTE — REASON FOR VISIT
[Follow-Up: _____] : a [unfilled] follow-up visit [Other: _____] : [unfilled] [FreeTextEntry1] : Ongoing UE radicular symptoms - left>>right

## 2022-12-28 VITALS — WEIGHT: 145 LBS | HEIGHT: 68 IN | BODY MASS INDEX: 21.98 KG/M2

## 2023-01-03 ENCOUNTER — RESULT REVIEW (OUTPATIENT)
Age: 63
End: 2023-01-03

## 2023-01-03 ENCOUNTER — OUTPATIENT (OUTPATIENT)
Dept: OUTPATIENT SERVICES | Facility: HOSPITAL | Age: 63
LOS: 1 days | End: 2023-01-03
Payer: MEDICARE

## 2023-01-03 VITALS
SYSTOLIC BLOOD PRESSURE: 101 MMHG | RESPIRATION RATE: 18 BRPM | OXYGEN SATURATION: 100 % | DIASTOLIC BLOOD PRESSURE: 75 MMHG | HEIGHT: 67 IN | WEIGHT: 162.04 LBS | TEMPERATURE: 98 F | HEART RATE: 84 BPM

## 2023-01-03 DIAGNOSIS — Z98.890 OTHER SPECIFIED POSTPROCEDURAL STATES: Chronic | ICD-10-CM

## 2023-01-03 DIAGNOSIS — E23.6 OTHER DISORDERS OF PITUITARY GLAND: Chronic | ICD-10-CM

## 2023-01-03 DIAGNOSIS — R51.9 HEADACHE, UNSPECIFIED: Chronic | ICD-10-CM

## 2023-01-03 DIAGNOSIS — Z90.89 ACQUIRED ABSENCE OF OTHER ORGANS: Chronic | ICD-10-CM

## 2023-01-03 DIAGNOSIS — M54.2 CERVICALGIA: Chronic | ICD-10-CM

## 2023-01-03 DIAGNOSIS — M54.12 RADICULOPATHY, CERVICAL REGION: ICD-10-CM

## 2023-01-03 DIAGNOSIS — Z01.818 ENCOUNTER FOR OTHER PREPROCEDURAL EXAMINATION: ICD-10-CM

## 2023-01-03 DIAGNOSIS — Z98.1 ARTHRODESIS STATUS: Chronic | ICD-10-CM

## 2023-01-03 LAB
A1C WITH ESTIMATED AVERAGE GLUCOSE RESULT: 5.6 % — SIGNIFICANT CHANGE UP (ref 4–5.6)
ALBUMIN SERPL ELPH-MCNC: 3.8 G/DL — SIGNIFICANT CHANGE UP (ref 3.3–5)
ANION GAP SERPL CALC-SCNC: 5 MMOL/L — SIGNIFICANT CHANGE UP (ref 5–17)
APPEARANCE UR: CLEAR — SIGNIFICANT CHANGE UP
APTT BLD: 31.9 SEC — SIGNIFICANT CHANGE UP (ref 27.5–35.5)
BACTERIA # UR AUTO: ABNORMAL
BASOPHILS # BLD AUTO: 0.03 K/UL — SIGNIFICANT CHANGE UP (ref 0–0.2)
BASOPHILS NFR BLD AUTO: 0.8 % — SIGNIFICANT CHANGE UP (ref 0–2)
BILIRUB UR-MCNC: NEGATIVE — SIGNIFICANT CHANGE UP
BLD GP AB SCN SERPL QL: SIGNIFICANT CHANGE UP
BUN SERPL-MCNC: 23 MG/DL — SIGNIFICANT CHANGE UP (ref 7–23)
CALCIUM SERPL-MCNC: 9.5 MG/DL — SIGNIFICANT CHANGE UP (ref 8.5–10.1)
CHLORIDE SERPL-SCNC: 107 MMOL/L — SIGNIFICANT CHANGE UP (ref 96–108)
CO2 SERPL-SCNC: 27 MMOL/L — SIGNIFICANT CHANGE UP (ref 22–31)
COLOR SPEC: YELLOW — SIGNIFICANT CHANGE UP
CREAT SERPL-MCNC: 0.72 MG/DL — SIGNIFICANT CHANGE UP (ref 0.5–1.3)
DIFF PNL FLD: ABNORMAL
EGFR: 93 ML/MIN/1.73M2 — SIGNIFICANT CHANGE UP
EOSINOPHIL # BLD AUTO: 0.14 K/UL — SIGNIFICANT CHANGE UP (ref 0–0.5)
EOSINOPHIL NFR BLD AUTO: 3.8 % — SIGNIFICANT CHANGE UP (ref 0–6)
EPI CELLS # UR: ABNORMAL
ESTIMATED AVERAGE GLUCOSE: 114 MG/DL — SIGNIFICANT CHANGE UP (ref 68–114)
GLUCOSE SERPL-MCNC: 131 MG/DL — HIGH (ref 70–99)
GLUCOSE UR QL: NEGATIVE — SIGNIFICANT CHANGE UP
HCT VFR BLD CALC: 35.7 % — SIGNIFICANT CHANGE UP (ref 34.5–45)
HGB BLD-MCNC: 12.3 G/DL — SIGNIFICANT CHANGE UP (ref 11.5–15.5)
IMM GRANULOCYTES NFR BLD AUTO: 0.3 % — SIGNIFICANT CHANGE UP (ref 0–0.9)
INR BLD: 0.96 RATIO — SIGNIFICANT CHANGE UP (ref 0.88–1.16)
KETONES UR-MCNC: NEGATIVE — SIGNIFICANT CHANGE UP
LEUKOCYTE ESTERASE UR-ACNC: ABNORMAL
LYMPHOCYTES # BLD AUTO: 1.14 K/UL — SIGNIFICANT CHANGE UP (ref 1–3.3)
LYMPHOCYTES # BLD AUTO: 30.6 % — SIGNIFICANT CHANGE UP (ref 13–44)
MCHC RBC-ENTMCNC: 32 PG — SIGNIFICANT CHANGE UP (ref 27–34)
MCHC RBC-ENTMCNC: 34.5 GM/DL — SIGNIFICANT CHANGE UP (ref 32–36)
MCV RBC AUTO: 93 FL — SIGNIFICANT CHANGE UP (ref 80–100)
MONOCYTES # BLD AUTO: 0.35 K/UL — SIGNIFICANT CHANGE UP (ref 0–0.9)
MONOCYTES NFR BLD AUTO: 9.4 % — SIGNIFICANT CHANGE UP (ref 2–14)
MRSA PCR RESULT.: SIGNIFICANT CHANGE UP
NEUTROPHILS # BLD AUTO: 2.06 K/UL — SIGNIFICANT CHANGE UP (ref 1.8–7.4)
NEUTROPHILS NFR BLD AUTO: 55.1 % — SIGNIFICANT CHANGE UP (ref 43–77)
NITRITE UR-MCNC: NEGATIVE — SIGNIFICANT CHANGE UP
PH UR: 5 — SIGNIFICANT CHANGE UP (ref 5–8)
PLATELET # BLD AUTO: 274 K/UL — SIGNIFICANT CHANGE UP (ref 150–400)
POTASSIUM SERPL-MCNC: 4.1 MMOL/L — SIGNIFICANT CHANGE UP (ref 3.5–5.3)
POTASSIUM SERPL-SCNC: 4.1 MMOL/L — SIGNIFICANT CHANGE UP (ref 3.5–5.3)
PROT UR-MCNC: 15
PROTHROM AB SERPL-ACNC: 11.1 SEC — SIGNIFICANT CHANGE UP (ref 10.5–13.4)
RBC # BLD: 3.84 M/UL — SIGNIFICANT CHANGE UP (ref 3.8–5.2)
RBC # FLD: 12.1 % — SIGNIFICANT CHANGE UP (ref 10.3–14.5)
RBC CASTS # UR COMP ASSIST: SIGNIFICANT CHANGE UP /HPF (ref 0–4)
S AUREUS DNA NOSE QL NAA+PROBE: SIGNIFICANT CHANGE UP
SODIUM SERPL-SCNC: 139 MMOL/L — SIGNIFICANT CHANGE UP (ref 135–145)
SP GR SPEC: 1.02 — SIGNIFICANT CHANGE UP (ref 1.01–1.02)
UROBILINOGEN FLD QL: NEGATIVE — SIGNIFICANT CHANGE UP
WBC # BLD: 3.73 K/UL — LOW (ref 3.8–10.5)
WBC # FLD AUTO: 3.73 K/UL — LOW (ref 3.8–10.5)
WBC UR QL: SIGNIFICANT CHANGE UP /HPF (ref 0–5)

## 2023-01-03 PROCEDURE — 87641 MR-STAPH DNA AMP PROBE: CPT

## 2023-01-03 PROCEDURE — 86900 BLOOD TYPING SEROLOGIC ABO: CPT

## 2023-01-03 PROCEDURE — 82040 ASSAY OF SERUM ALBUMIN: CPT

## 2023-01-03 PROCEDURE — 86850 RBC ANTIBODY SCREEN: CPT

## 2023-01-03 PROCEDURE — 85730 THROMBOPLASTIN TIME PARTIAL: CPT

## 2023-01-03 PROCEDURE — 71046 X-RAY EXAM CHEST 2 VIEWS: CPT

## 2023-01-03 PROCEDURE — 71046 X-RAY EXAM CHEST 2 VIEWS: CPT | Mod: 26

## 2023-01-03 PROCEDURE — 83036 HEMOGLOBIN GLYCOSYLATED A1C: CPT

## 2023-01-03 PROCEDURE — 85610 PROTHROMBIN TIME: CPT

## 2023-01-03 PROCEDURE — 87640 STAPH A DNA AMP PROBE: CPT

## 2023-01-03 PROCEDURE — 85025 COMPLETE CBC W/AUTO DIFF WBC: CPT

## 2023-01-03 PROCEDURE — 36415 COLL VENOUS BLD VENIPUNCTURE: CPT

## 2023-01-03 PROCEDURE — 86901 BLOOD TYPING SEROLOGIC RH(D): CPT

## 2023-01-03 PROCEDURE — 81001 URINALYSIS AUTO W/SCOPE: CPT

## 2023-01-03 PROCEDURE — 99214 OFFICE O/P EST MOD 30 MIN: CPT | Mod: 25

## 2023-01-03 PROCEDURE — 80048 BASIC METABOLIC PNL TOTAL CA: CPT

## 2023-01-03 RX ORDER — ATORVASTATIN CALCIUM 80 MG/1
0 TABLET, FILM COATED ORAL
Qty: 0 | Refills: 0 | DISCHARGE

## 2023-01-03 NOTE — H&P PST ADULT - NSICDXFAMILYHX_GEN_ALL_CORE_FT
FAMILY HISTORY:  FH: breast cancer  FH: multiple myeloma    Mother  Still living? Unknown  Family history of heart disease, Age at diagnosis: Age Unknown

## 2023-01-03 NOTE — H&P PST ADULT - NSANTHOSAYNRD_GEN_A_CORE
No. EBN screening performed.  STOP BANG Legend: 0-2 = LOW Risk; 3-4 = INTERMEDIATE Risk; 5-8 = HIGH Risk

## 2023-01-03 NOTE — H&P PST ADULT - NSICDXPASTMEDICALHX_GEN_ALL_CORE_FT
PAST MEDICAL HISTORY:  Chronic neck pain     HLD (hyperlipidemia)     HTN (hypertension)      PAST MEDICAL HISTORY:  Cervical neck pain with evidence of disc disease     Cervical radiculopathy     Chronic neck pain     Disc disorder of cervical region     HLD (hyperlipidemia)     HTN (hypertension)     OA (osteoarthritis)     Presence of pessary     Uterine prolapse

## 2023-01-03 NOTE — H&P PST ADULT - ASSESSMENT
61 y/o female with cervical spine discs disease, cervical radiculopathy, PMH of HTN, HLD, history of previous cervical fusions. Pt reports chronic neck pain that radiates mostly to her left arm with numbness and burning. She takes Gabapentin, Lyrica, Norco for pain with mild pain relief. She is scheduled for Anterior cervical discectomy and fusion C5/6, C6/7 with neuromonitoring, zero profile cage.   Plan  1. Stop all NSAIDS, herbal supplements and vitamins for 7 days.  2. NPO as per ASU instructions  3. Take the following medications ( Gabapentin, VNorco ) with small sips of water on the morning of your procedure/surgery.  4. Use EZ sponges as directed  5. Use mupirocin as directed  6. Labs, EKG, CXR as per surgeon. COVID test on 2023, pt instructed.  7. PMD visit for optimization prior to surgery as per surgeon.    CAPRINI SCORE [CLOT]    AGE RELATED RISK FACTORS                                                       MOBILITY RELATED FACTORS  [ ] Age 41-60 years                                            (1 Point)                  [ ] Bed rest                                                        (1 Point)  [x ] Age: 61-74 years                                           (2 Points)                 [ ] Plaster cast                                                   (2 Points)  [ ] Age= 75 years                                              (3 Points)                 [ ] Bed bound for more than 72 hours                 (2 Points)    DISEASE RELATED RISK FACTORS                                               GENDER SPECIFIC FACTORS  [ ] Edema in the lower extremities                       (1 Point)                  [ ] Pregnancy                                                     (1 Point)  [ ] Varicose veins                                               (1 Point)                  [ ] Post-partum < 6 weeks                                   (1 Point)             [ x ] BMI > 25 Kg/m2                                            (1 Point)                  [ ] Hormonal therapy  or oral contraception          (1 Point)                 [ ] Sepsis (in the previous month)                        (1 Point)                  [ ] History of pregnancy complications                 (1 point)  [ ] Pneumonia or serious lung disease                                               [ ] Unexplained or recurrent                     (1 Point)           (in the previous month)                               (1 Point)  [ ] Abnormal pulmonary function test                     (1 Point)                 SURGERY RELATED RISK FACTORS  [ ] Acute myocardial infarction                              (1 Point)                 [ ]  Section                                             (1 Point)  [ ] Congestive heart failure (in the previous month)  (1 Point)               [ ] Minor surgery                                                  (1 Point)   [ ] Inflammatory bowel disease                             (1 Point)                 [ ] Arthroscopic surgery                                        (2 Points)  [ ] Central venous access                                      (2 Points)                [ x ] General surgery lasting more than 45 minutes   (2 Points)       [ ] Stroke (in the previous month)                          (5 Points)               [ ] Elective arthroplasty                                         (5 Points)     ()  malignancy                                                             (2 points )                                                                                                                                      HEMATOLOGY RELATED FACTORS                                                 TRAUMA RELATED RISK FACTORS  [ ] Prior episodes of VTE                                     (3 Points)                 [ ] Fracture of the hip, pelvis, or leg                       (5 Points)  [ ] Positive family history for VTE                         (3 Points)                 [ ] Acute spinal cord injury (in the previous month)  (5 Points)  [ ] Prothrombin 36015 A                                     (3 Points)                 [ ] Paralysis  (less than 1 month)                             (5 Points)  [ ] Factor V Leiden                                             (3 Points)                  [ ] Multiple Trauma within 1 month                        (5 Points)  [ ] Lupus anticoagulants                                     (3 Points)                                                           [ ] Anticardiolipin antibodies                               (3 Points)                                                       [ ] High homocysteine in the blood                      (3 Points)                                             [ ] Other congenital or acquired thrombophilia      (3 Points)                                                [ ] Heparin induced thrombocytopenia                  (3 Points)    (  ) Malignancy                                        Total Score [      5    ]  The Caprini score indicates this patient is at risk for a VTE event (score 3-5).  Most surgical patients in this group would benefit from pharmacologic prophylaxis.  The surgical team will determine the balance between VTE risk and bleeding risk         61 y/o female with cervical spine discs disease, cervical radiculopathy, PMH of HTN, HLD, history of previous cervical fusions. Pt reports chronic neck pain that radiates mostly to her left arm with numbness and burning. She takes Gabapentin, Lyrica, Norco for pain with mild pain relief. She is scheduled for Anterior cervical discectomy and fusion C5/6, C6/7 with neuromonitoring, zero profile cage.   Plan  1. Stop all NSAIDS, herbal supplements and vitamins for 7 days.  2. NPO as per ASU instructions  3. Take the following medications ( Gabapentin, Norco ) with small sips of water on the morning of your procedure/surgery.  4. Use EZ sponges as directed  5. Use mupirocin as directed  6. Labs, EKG, CXR as per surgeon. COVID test on 2023, pt instructed.  7. PMD visit for optimization prior to surgery as per surgeon.    CAPRINI SCORE [CLOT]    AGE RELATED RISK FACTORS                                                       MOBILITY RELATED FACTORS  [ ] Age 41-60 years                                            (1 Point)                  [ ] Bed rest                                                        (1 Point)  [x ] Age: 61-74 years                                           (2 Points)                 [ ] Plaster cast                                                   (2 Points)  [ ] Age= 75 years                                              (3 Points)                 [ ] Bed bound for more than 72 hours                 (2 Points)    DISEASE RELATED RISK FACTORS                                               GENDER SPECIFIC FACTORS  [ ] Edema in the lower extremities                       (1 Point)                  [ ] Pregnancy                                                     (1 Point)  [ ] Varicose veins                                               (1 Point)                  [ ] Post-partum < 6 weeks                                   (1 Point)             [ x ] BMI > 25 Kg/m2                                            (1 Point)                  [ ] Hormonal therapy  or oral contraception          (1 Point)                 [ ] Sepsis (in the previous month)                        (1 Point)                  [ ] History of pregnancy complications                 (1 point)  [ ] Pneumonia or serious lung disease                                               [ ] Unexplained or recurrent                     (1 Point)           (in the previous month)                               (1 Point)  [ ] Abnormal pulmonary function test                     (1 Point)                 SURGERY RELATED RISK FACTORS  [ ] Acute myocardial infarction                              (1 Point)                 [ ]  Section                                             (1 Point)  [ ] Congestive heart failure (in the previous month)  (1 Point)               [ ] Minor surgery                                                  (1 Point)   [ ] Inflammatory bowel disease                             (1 Point)                 [ ] Arthroscopic surgery                                        (2 Points)  [ ] Central venous access                                      (2 Points)                [ x ] General surgery lasting more than 45 minutes   (2 Points)       [ ] Stroke (in the previous month)                          (5 Points)               [ ] Elective arthroplasty                                         (5 Points)     ()  malignancy                                                             (2 points )                                                                                                                                      HEMATOLOGY RELATED FACTORS                                                 TRAUMA RELATED RISK FACTORS  [ ] Prior episodes of VTE                                     (3 Points)                 [ ] Fracture of the hip, pelvis, or leg                       (5 Points)  [ ] Positive family history for VTE                         (3 Points)                 [ ] Acute spinal cord injury (in the previous month)  (5 Points)  [ ] Prothrombin 86747 A                                     (3 Points)                 [ ] Paralysis  (less than 1 month)                             (5 Points)  [ ] Factor V Leiden                                             (3 Points)                  [ ] Multiple Trauma within 1 month                        (5 Points)  [ ] Lupus anticoagulants                                     (3 Points)                                                           [ ] Anticardiolipin antibodies                               (3 Points)                                                       [ ] High homocysteine in the blood                      (3 Points)                                             [ ] Other congenital or acquired thrombophilia      (3 Points)                                                [ ] Heparin induced thrombocytopenia                  (3 Points)    (  ) Malignancy                                        Total Score [      5    ]  The Caprini score indicates this patient is at risk for a VTE event (score 3-5).  Most surgical patients in this group would benefit from pharmacologic prophylaxis.  The surgical team will determine the balance between VTE risk and bleeding risk         63 y/o female with cervical spine discs disease, cervical radiculopathy, PMH of HTN, HLD, history of previous cervical fusions. Pt reports chronic neck pain that radiates mostly to her left arm with numbness and burning. She takes Gabapentin, Lyrica, Norco for pain with mild pain relief. She is scheduled for Anterior cervical discectomy and fusion C5/6, C6/7 with neuromonitoring, zero profile cage.   Plan  1. Stop all NSAIDS, herbal supplements and vitamins for 7 days.  2. NPO as per ASU instructions  3. Take the following medications ( Gabapentin, Norco ) with small sips of water on the morning of your procedure/surgery.  4. Use EZ sponges as directed  5. Use mupirocin as directed  6. Labs, CXR as per surgeon. COVID test on 2023, pt instructed.  7. PMD visit for optimization prior to surgery as per surgeon.  8. EKG to be done at PMD office, spoke with Jeff at Dr. Bianchi's office.    CAPRINI SCORE [CLOT]    AGE RELATED RISK FACTORS                                                       MOBILITY RELATED FACTORS  [ ] Age 41-60 years                                            (1 Point)                  [ ] Bed rest                                                        (1 Point)  [x ] Age: 61-74 years                                           (2 Points)                 [ ] Plaster cast                                                   (2 Points)  [ ] Age= 75 years                                              (3 Points)                 [ ] Bed bound for more than 72 hours                 (2 Points)    DISEASE RELATED RISK FACTORS                                               GENDER SPECIFIC FACTORS  [ ] Edema in the lower extremities                       (1 Point)                  [ ] Pregnancy                                                     (1 Point)  [ ] Varicose veins                                               (1 Point)                  [ ] Post-partum < 6 weeks                                   (1 Point)             [ x ] BMI > 25 Kg/m2                                            (1 Point)                  [ ] Hormonal therapy  or oral contraception          (1 Point)                 [ ] Sepsis (in the previous month)                        (1 Point)                  [ ] History of pregnancy complications                 (1 point)  [ ] Pneumonia or serious lung disease                                               [ ] Unexplained or recurrent                     (1 Point)           (in the previous month)                               (1 Point)  [ ] Abnormal pulmonary function test                     (1 Point)                 SURGERY RELATED RISK FACTORS  [ ] Acute myocardial infarction                              (1 Point)                 [ ]  Section                                             (1 Point)  [ ] Congestive heart failure (in the previous month)  (1 Point)               [ ] Minor surgery                                                  (1 Point)   [ ] Inflammatory bowel disease                             (1 Point)                 [ ] Arthroscopic surgery                                        (2 Points)  [ ] Central venous access                                      (2 Points)                [ x ] General surgery lasting more than 45 minutes   (2 Points)       [ ] Stroke (in the previous month)                          (5 Points)               [ ] Elective arthroplasty                                         (5 Points)     ()  malignancy                                                             (2 points )                                                                                                                                      HEMATOLOGY RELATED FACTORS                                                 TRAUMA RELATED RISK FACTORS  [ ] Prior episodes of VTE                                     (3 Points)                 [ ] Fracture of the hip, pelvis, or leg                       (5 Points)  [ ] Positive family history for VTE                         (3 Points)                 [ ] Acute spinal cord injury (in the previous month)  (5 Points)  [ ] Prothrombin 04300 A                                     (3 Points)                 [ ] Paralysis  (less than 1 month)                             (5 Points)  [ ] Factor V Leiden                                             (3 Points)                  [ ] Multiple Trauma within 1 month                        (5 Points)  [ ] Lupus anticoagulants                                     (3 Points)                                                           [ ] Anticardiolipin antibodies                               (3 Points)                                                       [ ] High homocysteine in the blood                      (3 Points)                                             [ ] Other congenital or acquired thrombophilia      (3 Points)                                                [ ] Heparin induced thrombocytopenia                  (3 Points)    (  ) Malignancy                                        Total Score [      5    ]  The Caprini score indicates this patient is at risk for a VTE event (score 3-5).  Most surgical patients in this group would benefit from pharmacologic prophylaxis.  The surgical team will determine the balance between VTE risk and bleeding risk

## 2023-01-03 NOTE — H&P PST ADULT - HISTORY OF PRESENT ILLNESS
63 y/o female with cervical spine discs disease, cervical radiculopathy, PMH of HTN, HLD, history of previous cervical fusions. Pt reports chronic neck pain that radiates mostly to her left arm with numbness and burning. She takes Gabapentin, Lyrica, Norco for pain with mild pain relief. She is here for PST for planned Anterior cervical discectomy and fusion C5/6, C6/7 with neuromonitoring, zero profile cage.

## 2023-01-03 NOTE — H&P PST ADULT - NSICDXPASTSURGICALHX_GEN_ALL_CORE_FT
PAST SURGICAL HISTORY:  H/O elbow surgery right    H/O foot surgery b/l    H/O shoulder surgery right    Neck pain with history of cervical spinal surgery x2    Pituitary mass 30 y ears ago     PAST SURGICAL HISTORY:  H/O foot surgery b/l    H/O shoulder surgery right    History of elbow surgery     Neck pain with history of cervical spinal surgery x2    Pituitary mass 30 y ears ago    S/P breast biopsy     S/P cervical spinal fusion     S/P colonoscopy     S/P rotator cuff repair     S/P tonsillectomy

## 2023-01-04 DIAGNOSIS — Z01.818 ENCOUNTER FOR OTHER PREPROCEDURAL EXAMINATION: ICD-10-CM

## 2023-01-04 DIAGNOSIS — M54.12 RADICULOPATHY, CERVICAL REGION: ICD-10-CM

## 2023-01-06 ENCOUNTER — RX RENEWAL (OUTPATIENT)
Age: 63
End: 2023-01-06

## 2023-01-10 ENCOUNTER — APPOINTMENT (OUTPATIENT)
Dept: NEUROSURGERY | Facility: HOSPITAL | Age: 63
End: 2023-01-10

## 2023-01-10 ENCOUNTER — NON-APPOINTMENT (OUTPATIENT)
Age: 63
End: 2023-01-10

## 2023-01-10 ENCOUNTER — INPATIENT (INPATIENT)
Facility: HOSPITAL | Age: 63
LOS: 1 days | Discharge: HOME | DRG: 472 | End: 2023-01-12
Attending: SPECIALIST | Admitting: SPECIALIST
Payer: MEDICARE

## 2023-01-10 VITALS
SYSTOLIC BLOOD PRESSURE: 118 MMHG | DIASTOLIC BLOOD PRESSURE: 79 MMHG | WEIGHT: 151.02 LBS | HEART RATE: 70 BPM | OXYGEN SATURATION: 98 % | HEIGHT: 67 IN | TEMPERATURE: 98 F | RESPIRATION RATE: 16 BRPM

## 2023-01-10 DIAGNOSIS — Z98.890 OTHER SPECIFIED POSTPROCEDURAL STATES: Chronic | ICD-10-CM

## 2023-01-10 DIAGNOSIS — Z79.891 LONG TERM (CURRENT) USE OF OPIATE ANALGESIC: ICD-10-CM

## 2023-01-10 DIAGNOSIS — M54.12 RADICULOPATHY, CERVICAL REGION: ICD-10-CM

## 2023-01-10 DIAGNOSIS — M50.90 CERVICAL DISC DISORDER, UNSPECIFIED, UNSPECIFIED CERVICAL REGION: ICD-10-CM

## 2023-01-10 DIAGNOSIS — Z87.39 PERSONAL HISTORY OF OTHER DISEASES OF THE MUSCULOSKELETAL SYSTEM AND CONNECTIVE TISSUE: ICD-10-CM

## 2023-01-10 DIAGNOSIS — Z90.89 ACQUIRED ABSENCE OF OTHER ORGANS: Chronic | ICD-10-CM

## 2023-01-10 DIAGNOSIS — M79.643 PAIN IN UNSPECIFIED HAND: ICD-10-CM

## 2023-01-10 DIAGNOSIS — E23.6 OTHER DISORDERS OF PITUITARY GLAND: Chronic | ICD-10-CM

## 2023-01-10 DIAGNOSIS — M54.2 CERVICALGIA: Chronic | ICD-10-CM

## 2023-01-10 DIAGNOSIS — Z98.1 ARTHRODESIS STATUS: Chronic | ICD-10-CM

## 2023-01-10 DIAGNOSIS — Z86.79 PERSONAL HISTORY OF OTHER DISEASES OF THE CIRCULATORY SYSTEM: ICD-10-CM

## 2023-01-10 LAB
GLUCOSE BLDC GLUCOMTR-MCNC: 110 MG/DL — HIGH (ref 70–99)
GLUCOSE BLDC GLUCOMTR-MCNC: 143 MG/DL — HIGH (ref 70–99)
GLUCOSE BLDC GLUCOMTR-MCNC: 166 MG/DL — HIGH (ref 70–99)

## 2023-01-10 RX ORDER — ONDANSETRON 8 MG/1
4 TABLET, FILM COATED ORAL EVERY 6 HOURS
Refills: 0 | Status: DISCONTINUED | OUTPATIENT
Start: 2023-01-10 | End: 2023-01-12

## 2023-01-10 RX ORDER — OXYCODONE HYDROCHLORIDE 5 MG/1
5 TABLET ORAL ONCE
Refills: 0 | Status: DISCONTINUED | OUTPATIENT
Start: 2023-01-10 | End: 2023-01-10

## 2023-01-10 RX ORDER — LISINOPRIL 2.5 MG/1
5 TABLET ORAL DAILY
Refills: 0 | Status: DISCONTINUED | OUTPATIENT
Start: 2023-01-10 | End: 2023-01-12

## 2023-01-10 RX ORDER — ROSUVASTATIN CALCIUM 20 MG/1
20 TABLET, FILM COATED ORAL
Refills: 0 | Status: ACTIVE | COMMUNITY

## 2023-01-10 RX ORDER — CEFAZOLIN SODIUM 1 G
2000 VIAL (EA) INJECTION EVERY 8 HOURS
Refills: 0 | Status: COMPLETED | OUTPATIENT
Start: 2023-01-10 | End: 2023-01-11

## 2023-01-10 RX ORDER — ATORVASTATIN CALCIUM 80 MG/1
40 TABLET, FILM COATED ORAL AT BEDTIME
Refills: 0 | Status: DISCONTINUED | OUTPATIENT
Start: 2023-01-10 | End: 2023-01-12

## 2023-01-10 RX ORDER — HYDROMORPHONE HYDROCHLORIDE 2 MG/ML
0.5 INJECTION INTRAMUSCULAR; INTRAVENOUS; SUBCUTANEOUS
Refills: 0 | Status: DISCONTINUED | OUTPATIENT
Start: 2023-01-10 | End: 2023-01-10

## 2023-01-10 RX ORDER — HYDROMORPHONE HYDROCHLORIDE 2 MG/ML
0.5 INJECTION INTRAMUSCULAR; INTRAVENOUS; SUBCUTANEOUS EVERY 4 HOURS
Refills: 0 | Status: DISCONTINUED | OUTPATIENT
Start: 2023-01-10 | End: 2023-01-12

## 2023-01-10 RX ORDER — TRAMADOL HYDROCHLORIDE 50 MG/1
50 TABLET ORAL EVERY 8 HOURS
Refills: 0 | Status: DISCONTINUED | OUTPATIENT
Start: 2023-01-10 | End: 2023-01-11

## 2023-01-10 RX ORDER — ENOXAPARIN SODIUM 100 MG/ML
40 INJECTION SUBCUTANEOUS EVERY 24 HOURS
Refills: 0 | Status: DISCONTINUED | OUTPATIENT
Start: 2023-01-11 | End: 2023-01-12

## 2023-01-10 RX ORDER — GABAPENTIN 400 MG/1
800 CAPSULE ORAL THREE TIMES A DAY
Refills: 0 | Status: DISCONTINUED | OUTPATIENT
Start: 2023-01-10 | End: 2023-01-12

## 2023-01-10 RX ORDER — PREGABALIN 50 MG/1
50 CAPSULE ORAL
Refills: 0 | Status: ACTIVE | COMMUNITY

## 2023-01-10 RX ORDER — ONDANSETRON 8 MG/1
4 TABLET, FILM COATED ORAL ONCE
Refills: 0 | Status: DISCONTINUED | OUTPATIENT
Start: 2023-01-10 | End: 2023-01-10

## 2023-01-10 RX ORDER — FENTANYL CITRATE 50 UG/ML
50 INJECTION INTRAVENOUS
Refills: 0 | Status: DISCONTINUED | OUTPATIENT
Start: 2023-01-10 | End: 2023-01-10

## 2023-01-10 RX ORDER — BENAZEPRIL HYDROCHLORIDE AND HYDROCHLOROTHIAZIDE 10; 12.5 MG/1; MG/1
10-12.5 TABLET, FILM COATED ORAL
Refills: 0 | Status: ACTIVE | COMMUNITY

## 2023-01-10 RX ORDER — CYCLOBENZAPRINE HYDROCHLORIDE 10 MG/1
10 TABLET, FILM COATED ORAL 3 TIMES DAILY
Refills: 0 | Status: ACTIVE | COMMUNITY

## 2023-01-10 RX ORDER — ALPRAZOLAM 0.5 MG/1
0.5 TABLET ORAL
Refills: 0 | Status: ACTIVE | COMMUNITY

## 2023-01-10 RX ORDER — METHOCARBAMOL 500 MG/1
750 TABLET, FILM COATED ORAL EVERY 8 HOURS
Refills: 0 | Status: DISCONTINUED | OUTPATIENT
Start: 2023-01-10 | End: 2023-01-11

## 2023-01-10 RX ORDER — OXYCODONE HYDROCHLORIDE 5 MG/1
10 TABLET ORAL ONCE
Refills: 0 | Status: DISCONTINUED | OUTPATIENT
Start: 2023-01-10 | End: 2023-01-10

## 2023-01-10 RX ORDER — OXYCODONE HYDROCHLORIDE 5 MG/1
10 TABLET ORAL EVERY 4 HOURS
Refills: 0 | Status: DISCONTINUED | OUTPATIENT
Start: 2023-01-10 | End: 2023-01-11

## 2023-01-10 RX ORDER — SENNA PLUS 8.6 MG/1
2 TABLET ORAL AT BEDTIME
Refills: 0 | Status: DISCONTINUED | OUTPATIENT
Start: 2023-01-10 | End: 2023-01-12

## 2023-01-10 RX ORDER — ACETAMINOPHEN 500 MG
1000 TABLET ORAL ONCE
Refills: 0 | Status: COMPLETED | OUTPATIENT
Start: 2023-01-10 | End: 2023-01-10

## 2023-01-10 RX ORDER — SODIUM CHLORIDE 9 MG/ML
1000 INJECTION INTRAMUSCULAR; INTRAVENOUS; SUBCUTANEOUS
Refills: 0 | Status: DISCONTINUED | OUTPATIENT
Start: 2023-01-10 | End: 2023-01-12

## 2023-01-10 RX ORDER — SODIUM CHLORIDE 9 MG/ML
1000 INJECTION, SOLUTION INTRAVENOUS
Refills: 0 | Status: DISCONTINUED | OUTPATIENT
Start: 2023-01-10 | End: 2023-01-10

## 2023-01-10 RX ADMIN — Medication 1000 MILLIGRAM(S): at 20:33

## 2023-01-10 RX ADMIN — ATORVASTATIN CALCIUM 40 MILLIGRAM(S): 80 TABLET, FILM COATED ORAL at 21:18

## 2023-01-10 RX ADMIN — FENTANYL CITRATE 50 MICROGRAM(S): 50 INJECTION INTRAVENOUS at 15:00

## 2023-01-10 RX ADMIN — Medication 100 MILLIGRAM(S): at 20:06

## 2023-01-10 RX ADMIN — HYDROMORPHONE HYDROCHLORIDE 0.5 MILLIGRAM(S): 2 INJECTION INTRAMUSCULAR; INTRAVENOUS; SUBCUTANEOUS at 15:23

## 2023-01-10 RX ADMIN — Medication 400 MILLIGRAM(S): at 20:06

## 2023-01-10 RX ADMIN — FENTANYL CITRATE 50 MICROGRAM(S): 50 INJECTION INTRAVENOUS at 15:15

## 2023-01-10 RX ADMIN — OXYCODONE HYDROCHLORIDE 10 MILLIGRAM(S): 5 TABLET ORAL at 15:23

## 2023-01-10 RX ADMIN — Medication 50 MILLIGRAM(S): at 21:18

## 2023-01-10 RX ADMIN — HYDROMORPHONE HYDROCHLORIDE 0.5 MILLIGRAM(S): 2 INJECTION INTRAMUSCULAR; INTRAVENOUS; SUBCUTANEOUS at 23:07

## 2023-01-10 RX ADMIN — HYDROMORPHONE HYDROCHLORIDE 0.5 MILLIGRAM(S): 2 INJECTION INTRAMUSCULAR; INTRAVENOUS; SUBCUTANEOUS at 19:04

## 2023-01-10 RX ADMIN — SODIUM CHLORIDE 75 MILLILITER(S): 9 INJECTION, SOLUTION INTRAVENOUS at 15:00

## 2023-01-10 RX ADMIN — GABAPENTIN 800 MILLIGRAM(S): 400 CAPSULE ORAL at 23:08

## 2023-01-10 RX ADMIN — OXYCODONE HYDROCHLORIDE 10 MILLIGRAM(S): 5 TABLET ORAL at 21:18

## 2023-01-10 RX ADMIN — HYDROMORPHONE HYDROCHLORIDE 0.5 MILLIGRAM(S): 2 INJECTION INTRAMUSCULAR; INTRAVENOUS; SUBCUTANEOUS at 19:07

## 2023-01-10 RX ADMIN — HYDROMORPHONE HYDROCHLORIDE 0.5 MILLIGRAM(S): 2 INJECTION INTRAMUSCULAR; INTRAVENOUS; SUBCUTANEOUS at 16:40

## 2023-01-10 RX ADMIN — OXYCODONE HYDROCHLORIDE 10 MILLIGRAM(S): 5 TABLET ORAL at 22:00

## 2023-01-10 RX ADMIN — SENNA PLUS 2 TABLET(S): 8.6 TABLET ORAL at 21:19

## 2023-01-10 RX ADMIN — HYDROMORPHONE HYDROCHLORIDE 0.5 MILLIGRAM(S): 2 INJECTION INTRAMUSCULAR; INTRAVENOUS; SUBCUTANEOUS at 22:47

## 2023-01-10 RX ADMIN — HYDROMORPHONE HYDROCHLORIDE 0.5 MILLIGRAM(S): 2 INJECTION INTRAMUSCULAR; INTRAVENOUS; SUBCUTANEOUS at 15:45

## 2023-01-10 RX ADMIN — HYDROMORPHONE HYDROCHLORIDE 0.5 MILLIGRAM(S): 2 INJECTION INTRAMUSCULAR; INTRAVENOUS; SUBCUTANEOUS at 16:55

## 2023-01-10 RX ADMIN — OXYCODONE HYDROCHLORIDE 10 MILLIGRAM(S): 5 TABLET ORAL at 16:00

## 2023-01-10 NOTE — ASU PATIENT PROFILE, ADULT - FALL HARM RISK - UNIVERSAL INTERVENTIONS
Bed in lowest position, wheels locked, appropriate side rails in place/Call bell, personal items and telephone in reach/Instruct patient to call for assistance before getting out of bed or chair/Non-slip footwear when patient is out of bed/[place]erry to call system/Physically safe environment - no spills, clutter or unnecessary equipment/Purposeful Proactive Rounding/Room/bathroom lighting operational, light cord in reach

## 2023-01-10 NOTE — BRIEF OPERATIVE NOTE - NSICDXBRIEFPOSTOP_GEN_ALL_CORE_FT
POST-OP DIAGNOSIS:  Spondylosis of cervical spine with radiculopathy 10-Quique-2023 14:24:06  Pj Martínez

## 2023-01-10 NOTE — BRIEF OPERATIVE NOTE - NSICDXBRIEFPREOP_GEN_ALL_CORE_FT
PRE-OP DIAGNOSIS:  Spondylosis of cervical spine with radiculopathy 10-Quique-2023 14:23:40  Pj Martínez

## 2023-01-11 ENCOUNTER — RESULT REVIEW (OUTPATIENT)
Age: 63
End: 2023-01-11

## 2023-01-11 LAB
ANION GAP SERPL CALC-SCNC: 5 MMOL/L — SIGNIFICANT CHANGE UP (ref 5–17)
BASOPHILS # BLD AUTO: 0.01 K/UL — SIGNIFICANT CHANGE UP (ref 0–0.2)
BASOPHILS NFR BLD AUTO: 0.2 % — SIGNIFICANT CHANGE UP (ref 0–2)
BUN SERPL-MCNC: 12 MG/DL — SIGNIFICANT CHANGE UP (ref 7–23)
CALCIUM SERPL-MCNC: 9.3 MG/DL — SIGNIFICANT CHANGE UP (ref 8.5–10.1)
CHLORIDE SERPL-SCNC: 109 MMOL/L — HIGH (ref 96–108)
CO2 SERPL-SCNC: 25 MMOL/L — SIGNIFICANT CHANGE UP (ref 22–31)
CREAT SERPL-MCNC: 0.61 MG/DL — SIGNIFICANT CHANGE UP (ref 0.5–1.3)
EGFR: 100 ML/MIN/1.73M2 — SIGNIFICANT CHANGE UP
EOSINOPHIL # BLD AUTO: 0 K/UL — SIGNIFICANT CHANGE UP (ref 0–0.5)
EOSINOPHIL NFR BLD AUTO: 0 % — SIGNIFICANT CHANGE UP (ref 0–6)
GLUCOSE SERPL-MCNC: 113 MG/DL — HIGH (ref 70–99)
HCT VFR BLD CALC: 29.9 % — LOW (ref 34.5–45)
HGB BLD-MCNC: 10.4 G/DL — LOW (ref 11.5–15.5)
IMM GRANULOCYTES NFR BLD AUTO: 0.4 % — SIGNIFICANT CHANGE UP (ref 0–0.9)
LYMPHOCYTES # BLD AUTO: 0.84 K/UL — LOW (ref 1–3.3)
LYMPHOCYTES # BLD AUTO: 16.7 % — SIGNIFICANT CHANGE UP (ref 13–44)
MCHC RBC-ENTMCNC: 32.4 PG — SIGNIFICANT CHANGE UP (ref 27–34)
MCHC RBC-ENTMCNC: 34.8 GM/DL — SIGNIFICANT CHANGE UP (ref 32–36)
MCV RBC AUTO: 93.1 FL — SIGNIFICANT CHANGE UP (ref 80–100)
MONOCYTES # BLD AUTO: 0.5 K/UL — SIGNIFICANT CHANGE UP (ref 0–0.9)
MONOCYTES NFR BLD AUTO: 9.9 % — SIGNIFICANT CHANGE UP (ref 2–14)
NEUTROPHILS # BLD AUTO: 3.66 K/UL — SIGNIFICANT CHANGE UP (ref 1.8–7.4)
NEUTROPHILS NFR BLD AUTO: 72.8 % — SIGNIFICANT CHANGE UP (ref 43–77)
PLATELET # BLD AUTO: 240 K/UL — SIGNIFICANT CHANGE UP (ref 150–400)
POTASSIUM SERPL-MCNC: 3.7 MMOL/L — SIGNIFICANT CHANGE UP (ref 3.5–5.3)
POTASSIUM SERPL-SCNC: 3.7 MMOL/L — SIGNIFICANT CHANGE UP (ref 3.5–5.3)
RBC # BLD: 3.21 M/UL — LOW (ref 3.8–5.2)
RBC # FLD: 12.2 % — SIGNIFICANT CHANGE UP (ref 10.3–14.5)
SODIUM SERPL-SCNC: 139 MMOL/L — SIGNIFICANT CHANGE UP (ref 135–145)
WBC # BLD: 5.03 K/UL — SIGNIFICANT CHANGE UP (ref 3.8–10.5)
WBC # FLD AUTO: 5.03 K/UL — SIGNIFICANT CHANGE UP (ref 3.8–10.5)

## 2023-01-11 PROCEDURE — 97116 GAIT TRAINING THERAPY: CPT | Mod: GP

## 2023-01-11 PROCEDURE — 36415 COLL VENOUS BLD VENIPUNCTURE: CPT

## 2023-01-11 PROCEDURE — 97162 PT EVAL MOD COMPLEX 30 MIN: CPT | Mod: GP

## 2023-01-11 PROCEDURE — 85027 COMPLETE CBC AUTOMATED: CPT

## 2023-01-11 PROCEDURE — 72125 CT NECK SPINE W/O DYE: CPT | Mod: 26,MG

## 2023-01-11 PROCEDURE — G1004: CPT

## 2023-01-11 PROCEDURE — 80048 BASIC METABOLIC PNL TOTAL CA: CPT

## 2023-01-11 PROCEDURE — 97530 THERAPEUTIC ACTIVITIES: CPT | Mod: GP

## 2023-01-11 RX ORDER — DIAZEPAM 5 MG
5 TABLET ORAL EVERY 6 HOURS
Refills: 0 | Status: DISCONTINUED | OUTPATIENT
Start: 2023-01-11 | End: 2023-01-12

## 2023-01-11 RX ORDER — OXYCODONE HYDROCHLORIDE 5 MG/1
5 TABLET ORAL
Refills: 0 | Status: DISCONTINUED | OUTPATIENT
Start: 2023-01-11 | End: 2023-01-12

## 2023-01-11 RX ORDER — OXYCODONE HYDROCHLORIDE 5 MG/1
10 TABLET ORAL
Refills: 0 | Status: DISCONTINUED | OUTPATIENT
Start: 2023-01-11 | End: 2023-01-12

## 2023-01-11 RX ORDER — METHOCARBAMOL 500 MG/1
750 TABLET, FILM COATED ORAL EVERY 6 HOURS
Refills: 0 | Status: DISCONTINUED | OUTPATIENT
Start: 2023-01-11 | End: 2023-01-12

## 2023-01-11 RX ORDER — ACETAMINOPHEN 500 MG
1000 TABLET ORAL ONCE
Refills: 0 | Status: COMPLETED | OUTPATIENT
Start: 2023-01-11 | End: 2023-01-12

## 2023-01-11 RX ADMIN — HYDROMORPHONE HYDROCHLORIDE 0.5 MILLIGRAM(S): 2 INJECTION INTRAMUSCULAR; INTRAVENOUS; SUBCUTANEOUS at 05:10

## 2023-01-11 RX ADMIN — OXYCODONE HYDROCHLORIDE 10 MILLIGRAM(S): 5 TABLET ORAL at 01:18

## 2023-01-11 RX ADMIN — SENNA PLUS 2 TABLET(S): 8.6 TABLET ORAL at 21:04

## 2023-01-11 RX ADMIN — HYDROMORPHONE HYDROCHLORIDE 0.5 MILLIGRAM(S): 2 INJECTION INTRAMUSCULAR; INTRAVENOUS; SUBCUTANEOUS at 04:50

## 2023-01-11 RX ADMIN — GABAPENTIN 800 MILLIGRAM(S): 400 CAPSULE ORAL at 13:46

## 2023-01-11 RX ADMIN — TRAMADOL HYDROCHLORIDE 50 MILLIGRAM(S): 50 TABLET ORAL at 09:08

## 2023-01-11 RX ADMIN — METHOCARBAMOL 750 MILLIGRAM(S): 500 TABLET, FILM COATED ORAL at 18:05

## 2023-01-11 RX ADMIN — Medication 100 MILLIGRAM(S): at 21:03

## 2023-01-11 RX ADMIN — OXYCODONE HYDROCHLORIDE 10 MILLIGRAM(S): 5 TABLET ORAL at 19:50

## 2023-01-11 RX ADMIN — OXYCODONE HYDROCHLORIDE 10 MILLIGRAM(S): 5 TABLET ORAL at 05:32

## 2023-01-11 RX ADMIN — ATORVASTATIN CALCIUM 40 MILLIGRAM(S): 80 TABLET, FILM COATED ORAL at 21:03

## 2023-01-11 RX ADMIN — OXYCODONE HYDROCHLORIDE 10 MILLIGRAM(S): 5 TABLET ORAL at 06:10

## 2023-01-11 RX ADMIN — Medication 1 TABLET(S): at 10:23

## 2023-01-11 RX ADMIN — ENOXAPARIN SODIUM 40 MILLIGRAM(S): 100 INJECTION SUBCUTANEOUS at 21:04

## 2023-01-11 RX ADMIN — TRAMADOL HYDROCHLORIDE 50 MILLIGRAM(S): 50 TABLET ORAL at 08:08

## 2023-01-11 RX ADMIN — OXYCODONE HYDROCHLORIDE 10 MILLIGRAM(S): 5 TABLET ORAL at 02:00

## 2023-01-11 RX ADMIN — OXYCODONE HYDROCHLORIDE 10 MILLIGRAM(S): 5 TABLET ORAL at 14:28

## 2023-01-11 RX ADMIN — OXYCODONE HYDROCHLORIDE 10 MILLIGRAM(S): 5 TABLET ORAL at 15:28

## 2023-01-11 RX ADMIN — LISINOPRIL 5 MILLIGRAM(S): 2.5 TABLET ORAL at 11:18

## 2023-01-11 RX ADMIN — OXYCODONE HYDROCHLORIDE 10 MILLIGRAM(S): 5 TABLET ORAL at 11:23

## 2023-01-11 RX ADMIN — METHOCARBAMOL 750 MILLIGRAM(S): 500 TABLET, FILM COATED ORAL at 01:13

## 2023-01-11 RX ADMIN — OXYCODONE HYDROCHLORIDE 10 MILLIGRAM(S): 5 TABLET ORAL at 10:23

## 2023-01-11 RX ADMIN — Medication 100 MILLIGRAM(S): at 04:50

## 2023-01-11 RX ADMIN — GABAPENTIN 800 MILLIGRAM(S): 400 CAPSULE ORAL at 05:31

## 2023-01-11 RX ADMIN — METHOCARBAMOL 750 MILLIGRAM(S): 500 TABLET, FILM COATED ORAL at 23:19

## 2023-01-11 RX ADMIN — OXYCODONE HYDROCHLORIDE 10 MILLIGRAM(S): 5 TABLET ORAL at 23:25

## 2023-01-11 RX ADMIN — Medication 100 MILLIGRAM(S): at 12:46

## 2023-01-11 RX ADMIN — Medication 50 MILLIGRAM(S): at 10:23

## 2023-01-11 RX ADMIN — GABAPENTIN 800 MILLIGRAM(S): 400 CAPSULE ORAL at 21:03

## 2023-01-11 RX ADMIN — OXYCODONE HYDROCHLORIDE 10 MILLIGRAM(S): 5 TABLET ORAL at 20:20

## 2023-01-11 RX ADMIN — OXYCODONE HYDROCHLORIDE 10 MILLIGRAM(S): 5 TABLET ORAL at 22:55

## 2023-01-11 RX ADMIN — Medication 50 MILLIGRAM(S): at 21:03

## 2023-01-11 NOTE — PATIENT PROFILE ADULT - NSTRANSFEREYEGLASSESPAIRS_GEN_A_NUR
August 8, 2017      [facility]            [facility]  [address]  [facility] [address]  Phone: [phone]          Patient: Marc Lopez   MR Number: [id number]   YOB: 2004   Date of Visit: 8/8/2017       Dear [facility] :    Thank you for referring Marc Lopez to me for evaluation. Attached you will find relevant portions of my assessment and plan of care.    If you have questions, please do not hesitate to call me. I look forward to following Marc Lopez along with you.    Sincerely,    Manny Maya MD    Enclosure  CC:  No Recipients    If you would like to receive this communication electronically, please contact [email or web address] or [phone] to request more information on [employer] Link access.    For providers and/or their staff who would like to refer a patient to [facility], please contact us through our one-stop-shop provider referral line, Yo Magaña, at [phone].    If you feel you have received this communication in error or would no longer like to receive these types of communications, please e-mail [email or web address]       
1 pair

## 2023-01-11 NOTE — PHYSICAL THERAPY INITIAL EVALUATION ADULT - DID THE PATIENT HAVE SURGERY?
Viable infant girl born via [address] [place] [address] at 26. Apgars 5/9.  Baby to NICU
yes

## 2023-01-11 NOTE — PROGRESS NOTE ADULT - SUBJECTIVE AND OBJECTIVE BOX
62 year old female with a PMH of cervical radiculopathy, HTN, HLD  that presented for elective ACDFI C5-6, C6-7 on 1/10    1/11 POD#1 ACDFI C5-7, HV output 45cc, c/o neck pain, burning pain in LUE resolved    ICU Vital Signs Last 24 Hrs  T(C): 36.4 (11 Jan 2023 05:38), Max: 36.8 (11 Jan 2023 00:42)  T(F): 97.5 (11 Jan 2023 05:38), Max: 98.2 (11 Jan 2023 00:42)  HR: 75 (11 Jan 2023 05:38) (75 - 128)  BP: 126/85 (11 Jan 2023 05:38) (110/55 - 127/73)  BP(mean): --  ABP: --  ABP(mean): --  RR: 18 (11 Jan 2023 05:38) (12 - 23)  SpO2: 97% (11 Jan 2023 05:38) (93% - 100%)    O2 Parameters below as of 11 Jan 2023 05:38  Patient On (Oxygen Delivery Method): nasal cannula        MEDICATIONS  (STANDING):  atorvastatin 40 milliGRAM(s) Oral at bedtime  ceFAZolin   IVPB 2000 milliGRAM(s) IV Intermittent every 8 hours  enoxaparin Injectable 40 milliGRAM(s) SubCutaneous every 24 hours  gabapentin 800 milliGRAM(s) Oral three times a day  hydrochlorothiazide 12.5 milliGRAM(s) Oral daily  lisinopril 5 milliGRAM(s) Oral daily  multivitamin 1 Tablet(s) Oral daily  pregabalin 50 milliGRAM(s) Oral two times a day  senna 2 Tablet(s) Oral at bedtime  sodium chloride 0.9%. 1000 milliLiter(s) (75 mL/Hr) IV Continuous <Continuous>    MEDICATIONS  (PRN):  bisacodyl 5 milliGRAM(s) Oral every 12 hours PRN Constipation  HYDROmorphone  Injectable 0.5 milliGRAM(s) IV Push every 4 hours PRN Breakthrough Pain  methocarbamol 750 milliGRAM(s) Oral every 8 hours PRN Muscle Spasm  ondansetron Injectable 4 milliGRAM(s) IV Push every 6 hours PRN Nausea and/or Vomiting  oxyCODONE    IR 10 milliGRAM(s) Oral every 4 hours PRN Moderate Pain (4 - 6)  traMADol 50 milliGRAM(s) Oral every 8 hours PRN Severe Pain (7 - 10)                            10.4   5.03  )-----------( 240      ( 11 Jan 2023 07:57 )             29.9   01-11    139  |  109<H>  |  12  ----------------------------<  113<H>  3.7   |  25  |  0.61    Ca    9.3      11 Jan 2023 07:57      ROS: denies chest pain, SOB, palpitations, nausea, vomiting, weakness or visual changes    Constitutional: awake and alert.  HEENT: PERRL, EOMI  Neck: Duke collar, HV in place  Respiratory: Breath sounds are clear bilaterally  Cardiovascular: S1 and S2, regular rhythm  Gastrointestinal: soft, nontender  Extremities:  no edema  Musculoskeletal: no joint swelling/tenderness, no abnormal movements  Skin: No rashes    Neurological exam:  Awake, alert, AOx3  In Duke collar HV inplace  Face symmetrical, speech clear & fluent   Pupils reactive bilat EOMI, no nystagmus  Motor: Strength 5/5 in all 4 ext, normal bulk and tone, no tremor, rigidity or bradykinesia.    Sensation: Intact to light touch  No Hoffmans No clonus  Gait/Balance: Not tested      `

## 2023-01-11 NOTE — PROGRESS NOTE ADULT - NUTRITIONAL ASSESSMENT
62 year old female POD#1 ACDFI C5-6, C6-7    Continue HV drain   tylenol/oxy IR for pain   roaxin/valium for spasm  am labs  OOB PT]  SCD s for DVT prophylaxis  incentive spirometry q1h while awake  d/w Dr Martínez 62 year old female POD#1 ACDFI C5-6, C6-7    Continue HV drain   tylenol/oxy IR for pain   roaxin/valium for spasm  am labs  OOB PT  CT C-spine completed & reviewed by Dr Martínez  SCD s for DVT prophylaxis  incentive spirometry q1h while awake  d/w Dr Martínez

## 2023-01-11 NOTE — PATIENT PROFILE ADULT - FALL HARM RISK - UNIVERSAL INTERVENTIONS
Clarissa Westfall presents to the clinic today for management of his anticoagulation therapy in treatment of his atrial fibrillation. Target INR is 2.0-3.0. His INR today was subtherapeutic at 1.9 on 24 mg of Warfarin over the last 5 days, as his Warfarin was resumed on Thursday 2/28/19 s/p lymph node biopsy. His previous stable weekly dose was 26mg. His previous INR was 1.4 on 2/21/19 as he had been holding Warfarin for his procedure. The patient denies medication changes since the last visit. He  denies diet changes since the last visit. He was able to ambulate to office without assistive device. Sony Enriquez will take a one time dose increase today of 6mg to increase INR into therapeutic range, then resume taking 2mg on sundays, and 4mg the rest of the week; totaling 26mg weekly. He will return to the clinic in 2 weeks on 3/19/19 for INR fingerstick. Onsite billing provider is CHANEL Bowles. Bed in lowest position, wheels locked, appropriate side rails in place/Call bell, personal items and telephone in reach/Instruct patient to call for assistance before getting out of bed or chair/Non-slip footwear when patient is out of bed/[place] to call system/Physically safe environment - no spills, clutter or unnecessary equipment/Purposeful Proactive Rounding/Room/bathroom lighting operational, light cord in reach

## 2023-01-11 NOTE — PHYSICAL THERAPY INITIAL EVALUATION ADULT - PERTINENT HX OF CURRENT PROBLEM, REHAB EVAL
as per H&P PST Note 1/3/23- 61 y/o female with cervical spine discs disease, cervical radiculopathy, PMH of HTN, HLD, history of previous cervical fusions. Pt reports chronic neck pain that radiates mostly to her left arm with numbness and burning. She takes Gabapentin, Lyrica, Norco for pain with mild pain relief. She is here for PST for planned Anterior cervical discectomy and fusion C5/6, C6/7 with neuromonitoring, zero profile cage.     as per brief op note 1/10/23 - operative findings: ACDF at C5/6 and C6/7 using 4web cervical truss system from [employer] App. neuromonitoring stable throughout for motor - sensory and vocal cords

## 2023-01-11 NOTE — PHYSICAL THERAPY INITIAL EVALUATION ADULT - GENERAL OBSERVATIONS, REHAB EVAL
The pt was received on 2N, supine, cervical collar in place. The pt's family member was present bedside. The pt had 1 Hemovac in place. The pt was willing to get OOB and ambulate with PT.

## 2023-01-12 ENCOUNTER — TRANSCRIPTION ENCOUNTER (OUTPATIENT)
Age: 63
End: 2023-01-12

## 2023-01-12 VITALS
SYSTOLIC BLOOD PRESSURE: 110 MMHG | HEART RATE: 86 BPM | TEMPERATURE: 100 F | DIASTOLIC BLOOD PRESSURE: 65 MMHG | OXYGEN SATURATION: 95 % | RESPIRATION RATE: 16 BRPM

## 2023-01-12 LAB
ANION GAP SERPL CALC-SCNC: 5 MMOL/L — SIGNIFICANT CHANGE UP (ref 5–17)
BUN SERPL-MCNC: 14 MG/DL — SIGNIFICANT CHANGE UP (ref 7–23)
CALCIUM SERPL-MCNC: 9.5 MG/DL — SIGNIFICANT CHANGE UP (ref 8.5–10.1)
CHLORIDE SERPL-SCNC: 105 MMOL/L — SIGNIFICANT CHANGE UP (ref 96–108)
CO2 SERPL-SCNC: 30 MMOL/L — SIGNIFICANT CHANGE UP (ref 22–31)
CREAT SERPL-MCNC: 0.98 MG/DL — SIGNIFICANT CHANGE UP (ref 0.5–1.3)
EGFR: 64 ML/MIN/1.73M2 — SIGNIFICANT CHANGE UP
GLUCOSE SERPL-MCNC: 123 MG/DL — HIGH (ref 70–99)
HCT VFR BLD CALC: 32.6 % — LOW (ref 34.5–45)
HGB BLD-MCNC: 10.9 G/DL — LOW (ref 11.5–15.5)
MCHC RBC-ENTMCNC: 31.6 PG — SIGNIFICANT CHANGE UP (ref 27–34)
MCHC RBC-ENTMCNC: 33.4 GM/DL — SIGNIFICANT CHANGE UP (ref 32–36)
MCV RBC AUTO: 94.5 FL — SIGNIFICANT CHANGE UP (ref 80–100)
PLATELET # BLD AUTO: 227 K/UL — SIGNIFICANT CHANGE UP (ref 150–400)
POTASSIUM SERPL-MCNC: 3.6 MMOL/L — SIGNIFICANT CHANGE UP (ref 3.5–5.3)
POTASSIUM SERPL-SCNC: 3.6 MMOL/L — SIGNIFICANT CHANGE UP (ref 3.5–5.3)
RBC # BLD: 3.45 M/UL — LOW (ref 3.8–5.2)
RBC # FLD: 12.7 % — SIGNIFICANT CHANGE UP (ref 10.3–14.5)
SODIUM SERPL-SCNC: 140 MMOL/L — SIGNIFICANT CHANGE UP (ref 135–145)
WBC # BLD: 6.09 K/UL — SIGNIFICANT CHANGE UP (ref 3.8–10.5)
WBC # FLD AUTO: 6.09 K/UL — SIGNIFICANT CHANGE UP (ref 3.8–10.5)

## 2023-01-12 RX ORDER — HYDROCODONE BITARTRATE AND ACETAMINOPHEN 7.5; 325 MG/15ML; MG/15ML
1 SOLUTION ORAL
Qty: 0 | Refills: 0 | DISCHARGE

## 2023-01-12 RX ORDER — OXYCODONE HYDROCHLORIDE 5 MG/1
1 TABLET ORAL
Qty: 60 | Refills: 0
Start: 2023-01-12

## 2023-01-12 RX ORDER — DIAZEPAM 5 MG
1 TABLET ORAL
Qty: 30 | Refills: 0
Start: 2023-01-12

## 2023-01-12 RX ORDER — SENNA PLUS 8.6 MG/1
2 TABLET ORAL
Qty: 20 | Refills: 0
Start: 2023-01-12

## 2023-01-12 RX ORDER — NALOXONE HYDROCHLORIDE 4 MG/.1ML
1 SPRAY NASAL
Qty: 1 | Refills: 0
Start: 2023-01-12

## 2023-01-12 RX ORDER — METHOCARBAMOL 500 MG/1
1 TABLET, FILM COATED ORAL
Qty: 30 | Refills: 0
Start: 2023-01-12

## 2023-01-12 RX ORDER — KETOROLAC TROMETHAMINE 30 MG/ML
30 SYRINGE (ML) INJECTION ONCE
Refills: 0 | Status: DISCONTINUED | OUTPATIENT
Start: 2023-01-12 | End: 2023-01-12

## 2023-01-12 RX ADMIN — Medication 1 TABLET(S): at 09:41

## 2023-01-12 RX ADMIN — OXYCODONE HYDROCHLORIDE 10 MILLIGRAM(S): 5 TABLET ORAL at 04:13

## 2023-01-12 RX ADMIN — GABAPENTIN 800 MILLIGRAM(S): 400 CAPSULE ORAL at 05:25

## 2023-01-12 RX ADMIN — METHOCARBAMOL 750 MILLIGRAM(S): 500 TABLET, FILM COATED ORAL at 11:44

## 2023-01-12 RX ADMIN — OXYCODONE HYDROCHLORIDE 10 MILLIGRAM(S): 5 TABLET ORAL at 08:16

## 2023-01-12 RX ADMIN — METHOCARBAMOL 750 MILLIGRAM(S): 500 TABLET, FILM COATED ORAL at 05:25

## 2023-01-12 RX ADMIN — Medication 400 MILLIGRAM(S): at 10:22

## 2023-01-12 RX ADMIN — OXYCODONE HYDROCHLORIDE 10 MILLIGRAM(S): 5 TABLET ORAL at 07:46

## 2023-01-12 RX ADMIN — Medication 30 MILLIGRAM(S): at 10:37

## 2023-01-12 RX ADMIN — Medication 30 MILLIGRAM(S): at 10:22

## 2023-01-12 RX ADMIN — Medication 50 MILLIGRAM(S): at 09:42

## 2023-01-12 RX ADMIN — Medication 1000 MILLIGRAM(S): at 10:22

## 2023-01-12 RX ADMIN — OXYCODONE HYDROCHLORIDE 10 MILLIGRAM(S): 5 TABLET ORAL at 03:43

## 2023-01-12 NOTE — DISCHARGE NOTE PROVIDER - CARE PROVIDER_API CALL
Pj Martínez; PhD)  Neurosurgery  [address] [facility] [address] [facility] [place] [address]  Phone: [phone]  Fax: [phone]  Established Patient  Follow Up Time: 2 weeks

## 2023-01-12 NOTE — DISCHARGE NOTE PROVIDER - NSDCCPCAREPLAN_GEN_ALL_CORE_FT
PRINCIPAL DISCHARGE DIAGNOSIS  Diagnosis: Radiculopathy, cervical region  Assessment and Plan of Treatment: Community Status: Active      SECONDARY DISCHARGE DIAGNOSES  Diagnosis: Hypertension  Assessment and Plan of Treatment:

## 2023-01-12 NOTE — DISCHARGE NOTE PROVIDER - HOSPITAL COURSE
61 yo female with a previous medical history of cervical radiculopathy status post previous cervical fusion presented with complaint of chronic neck pain that radiated mostly to her Left arm with associated numbness and burning. She is now status post planned Anterior Cervical Discectomy and Fusion at C5/6, C6/7 with neuromonitoring and zero profile cage. Postoperative course was uncomplicated. Pain is improved on current regimen. She is now stable and clear for discharge from neurosurgical standpoint with close outpatient follow up.

## 2023-01-12 NOTE — DISCHARGE NOTE PROVIDER - NSDCMRMEDTOKEN_GEN_ALL_CORE_FT
ashwaganda: orally once a day  benazepril-hydrochlorothiazide 20 mg-12.5 mg oral tablet: 1 tab(s) orally once a day  bisacodyl 5 mg oral delayed release tablet: 1 tab(s) orally every 12 hours, As needed, Constipation  black elderberry: orally once a day  diazePAM 5 mg oral tablet: 1 tab(s) orally every 6 hours, As Needed -muscle spasm MDD:4  gabapentin 300 mg oral capsule: 1 cap(s) orally 3 times a day  gabapentin 800 mg oral tablet: 1 tab(s) orally 3 times a day  Lyrica 50 mg oral capsule: 1 cap(s) orally 2 times a day  methocarbamol 750 mg oral tablet: 1 tab(s) orally every 8 hours   Multiple Vitamins oral tablet: 1 tab(s) orally once a day  naloxone 4 mg/0.1 mL nasal spray: 1 spray(s) intranasally once   oxyCODONE 10 mg oral tablet: 1 tab(s) orally every 3 hours, As needed, Severe Pain (7 - 10) MDD:6  rosuvastatin 20 mg oral capsule: 1 cap(s) orally once a day  senna leaf extract oral tablet: 2 tab(s) orally once a day (at bedtime)

## 2023-01-12 NOTE — DISCHARGE NOTE PROVIDER - NSDCCPTREATMENT_GEN_ALL_CORE_FT
PRINCIPAL PROCEDURE  Procedure: Anterior cervical discectomy with fusion 2 levels  Findings and Treatment:

## 2023-01-12 NOTE — DISCHARGE NOTE NURSING/CASE MANAGEMENT/SOCIAL WORK - PATIENT PORTAL LINK FT
You can access the FollowMyHealth Patient Portal offered by [facility] by registering at the following website: [email or web address]. By joining [facility]’s FollowMyHealth portal, you will also be able to view your health information using other applications (apps) compatible with our system.

## 2023-01-12 NOTE — DISCHARGE NOTE PROVIDER - NSDCFUADDINST_GEN_ALL_CORE_FT
Monitor incision for signs of infection including redness, swelling, and discharge. You may shower but do not let water 'beat' directly on incision. Gradually increase activities / walking. Do not lift anything heavier than a gallon of milk. Continue to use your incentive spirometer. Any new numbness / tingling / weakness, any change in character or intensity of pain, or any sign of infection - call Dr Martínez's office or visit the [facility] emergency room

## 2023-01-18 DIAGNOSIS — M47.12 OTHER SPONDYLOSIS WITH MYELOPATHY, CERVICAL REGION: ICD-10-CM

## 2023-01-18 DIAGNOSIS — M50.122 CERVICAL DISC DISORDER AT C5-C6 LEVEL WITH RADICULOPATHY: ICD-10-CM

## 2023-01-18 DIAGNOSIS — E78.5 HYPERLIPIDEMIA, UNSPECIFIED: ICD-10-CM

## 2023-01-18 DIAGNOSIS — M50.123 CERVICAL DISC DISORDER AT C6-C7 LEVEL WITH RADICULOPATHY: ICD-10-CM

## 2023-01-18 DIAGNOSIS — I10 ESSENTIAL (PRIMARY) HYPERTENSION: ICD-10-CM

## 2023-01-18 PROBLEM — Z96.0 PRESENCE OF UROGENITAL IMPLANTS: Chronic | Status: ACTIVE | Noted: 2023-01-03

## 2023-01-18 PROBLEM — M54.12 RADICULOPATHY, CERVICAL REGION: Chronic | Status: ACTIVE | Noted: 2023-01-03

## 2023-01-18 PROBLEM — M50.90 CERVICAL DISC DISORDER, UNSPECIFIED, UNSPECIFIED CERVICAL REGION: Chronic | Status: ACTIVE | Noted: 2023-01-03

## 2023-01-18 PROBLEM — M19.90 UNSPECIFIED OSTEOARTHRITIS, UNSPECIFIED SITE: Chronic | Status: ACTIVE | Noted: 2023-01-03

## 2023-01-18 PROBLEM — N81.4 UTEROVAGINAL PROLAPSE, UNSPECIFIED: Chronic | Status: ACTIVE | Noted: 2023-01-03

## 2023-01-19 LAB — SARS-COV-2 N GENE NPH QL NAA+PROBE: NOT DETECTED

## 2023-01-20 ENCOUNTER — NON-APPOINTMENT (OUTPATIENT)
Age: 63
End: 2023-01-20

## 2023-01-23 ENCOUNTER — NON-APPOINTMENT (OUTPATIENT)
Age: 63
End: 2023-01-23

## 2023-01-23 DIAGNOSIS — Z86.69 PERSONAL HISTORY OF OTHER DISEASES OF THE NERVOUS SYSTEM AND SENSE ORGANS: ICD-10-CM

## 2023-01-24 ENCOUNTER — APPOINTMENT (OUTPATIENT)
Dept: NEUROSURGERY | Facility: CLINIC | Age: 63
End: 2023-01-24
Payer: MEDICARE

## 2023-01-24 ENCOUNTER — APPOINTMENT (OUTPATIENT)
Dept: PAIN MANAGEMENT | Facility: CLINIC | Age: 63
End: 2023-01-24
Payer: MEDICARE

## 2023-01-24 VITALS — HEIGHT: 68 IN | BODY MASS INDEX: 21.98 KG/M2 | WEIGHT: 145 LBS

## 2023-01-24 VITALS
OXYGEN SATURATION: 98 % | SYSTOLIC BLOOD PRESSURE: 129 MMHG | DIASTOLIC BLOOD PRESSURE: 86 MMHG | WEIGHT: 150 LBS | HEIGHT: 67 IN | HEART RATE: 87 BPM | TEMPERATURE: 98.2 F | BODY MASS INDEX: 23.54 KG/M2

## 2023-01-24 PROCEDURE — 99024 POSTOP FOLLOW-UP VISIT: CPT

## 2023-01-24 PROCEDURE — 99213 OFFICE O/P EST LOW 20 MIN: CPT | Mod: 95

## 2023-01-24 NOTE — ASSESSMENT
[FreeTextEntry1] : Alert and oriented X 3. Hard cervical collar in place. States improved sensation and decreased burning to hands bilaterally.

## 2023-01-24 NOTE — REASON FOR VISIT
[Follow-Up Visit] : a follow-up pain management visit [Home] : at home, [unfilled] , at the time of the visit. [Medical Office: ([facility])___] : at the medical office located in  [Patient] : the patient [Self] : self [FreeTextEntry2] : Neck pain

## 2023-01-24 NOTE — HISTORY OF PRESENT ILLNESS
[Neck] : neck [Gradual] : gradual [8] : 8 [Radiating] : radiating [Shooting] : shooting [Stabbing] : stabbing [Throbbing] : throbbing [Constant] : constant [Household chores] : household chores [Work] : work [Sleep] : sleep [Rest] : rest [Meds] : meds [Sitting] : sitting [Standing] : standing [Walking] : walking [Bending forward] : bending forward [Disabled] : Work status: disabled [Steroid] : Steroid [FreeTextEntry1] : S/P revision of cervical fusion two weeks ago with Dr Martínez. States she has been managing on Oxycodone 10mg four times daily prn now and has not taken Morphine ER or Allgood. Notes her left hand burning and pain has decreased. Wearing a hard cervical collar and follow up with Dr Martínez today. States her swallowing has improved.  [] : no [FreeTextEntry7] : NECK TO RIGHT SHOULDER BLADE  [de-identified] : 01/10/2023 [de-identified] : 2023 CERVICAL  [de-identified] : CERVICAL  [de-identified] : EPIDURAL

## 2023-01-24 NOTE — DISCUSSION/SUMMARY
[Medication Risks Reviewed] : Medication risks reviewed [de-identified] : Prescriptions renewed. To continue Oxycodone 10mg.  [place]obt[place]  reviewed and appropriate. SOAPP-R completed on chart. The patient's medications are documented to the best of their ability. Quality of life and functional ability improved on medications. The patient is showing no aberrant behavior or evidence of diversion. The patient was advised not to use narcotic medication while operating an automobile or heavy machinery due to potential sedation or dizziness. The patient was educated to the risks associated with potential opioid dependence and addiction. Urine toxicology screens as per office protocol. Use of multimodal analgesia used prn.\par Follow up one month.

## 2023-01-25 ENCOUNTER — NON-APPOINTMENT (OUTPATIENT)
Age: 63
End: 2023-01-25

## 2023-02-13 ENCOUNTER — APPOINTMENT (OUTPATIENT)
Dept: UROGYNECOLOGY | Facility: CLINIC | Age: 63
End: 2023-02-13
Payer: MEDICARE

## 2023-02-13 ENCOUNTER — APPOINTMENT (OUTPATIENT)
Dept: UROGYNECOLOGY | Facility: CLINIC | Age: 63
End: 2023-02-13

## 2023-02-13 VITALS
HEART RATE: 72 BPM | HEIGHT: 67 IN | WEIGHT: 155 LBS | OXYGEN SATURATION: 97 % | SYSTOLIC BLOOD PRESSURE: 109 MMHG | BODY MASS INDEX: 24.33 KG/M2 | DIASTOLIC BLOOD PRESSURE: 74 MMHG

## 2023-02-13 DIAGNOSIS — N36.41 HYPERMOBILITY OF URETHRA: ICD-10-CM

## 2023-02-13 DIAGNOSIS — N39.46 MIXED INCONTINENCE: ICD-10-CM

## 2023-02-13 PROCEDURE — 99215 OFFICE O/P EST HI 40 MIN: CPT

## 2023-02-13 RX ORDER — METHYLPREDNISOLONE 4 MG/1
4 TABLET ORAL
Qty: 1 | Refills: 0 | Status: COMPLETED | COMMUNITY
Start: 2023-01-16 | End: 2023-02-13

## 2023-02-13 NOTE — HISTORY OF PRESENT ILLNESS
[FreeTextEntry5] : took pessary out this morning [de-identified] : sometimes  [de-identified] : frequently [FreeTextEntry1] : pt here for POP, has pessary which "moves around". pt interested in surgical management \robert [place] chart was reviewed.  This is the first time I am meeting the patient.\par Patient was last seen in Nov 2022 by Dr. Merly Yang, when she was fitted with a #6 RWS pessary while awaiting surgical management. Urodynamics testing in Oct 2022 showed stress urinary incontinence, some urinary retention with uncorrected prolapse but no DI. She also had a TVUS in Sept 2022 which showed a normal sized postmenopausal uterus with a 2mm endometrial stripe and minimal simple fluid distending the cavity. R. ovary was visualized and wnl, no masses. L ovary was not visualized.

## 2023-02-13 NOTE — DISCUSSION/SUMMARY
[FreeTextEntry1] : I reviewed the above findings with the patient with visual illustrations. Treatment options for the prolapse were discussed and included doing nothing, Kegel exercises and behavioral modification, a pessary, or surgical correction.Surgically we discussed the abdominal vs the vaginal routes. Abdominally we discussed a hysterectomy and a sacral colpopexy   laparoscopically and robotically.  Vaginally we discussed a vaginal hysterectomy, uterosacral suspension, and anterior/posterior repair. Surgically we discussed hysteropexy as well as the use of biologics.  She is interested in surgical correction for the prolapse and wishes to proceed with a laparoscopic robotic route with a hysterectomy and sacrocolpopexy.  We also discussed possible posterior repair.  With regards to the stress urinary incontinence treatment options were discussed and she wishes to proceed with a mid urethral sling.  We discussed the possibility of going home with a catheter.  We discussed leaving her pessary out for approximately 2 weeks prior to her next visit for me to fully evaluate the extent of her prolapse.  I UG a patient information on sacrocolpopexy with hysterectomy written on top, mid urethral sling, and posterior repair with possible written on top was given to her.  All questions were answered.\par

## 2023-02-14 RX ORDER — DIAZEPAM 5 MG/1
5 TABLET ORAL 3 TIMES DAILY
Qty: 30 | Refills: 0 | Status: ACTIVE | COMMUNITY
Start: 2023-02-14 | End: 1900-01-01

## 2023-02-21 ENCOUNTER — APPOINTMENT (OUTPATIENT)
Dept: PAIN MANAGEMENT | Facility: CLINIC | Age: 63
End: 2023-02-21
Payer: MEDICARE

## 2023-02-21 VITALS — HEIGHT: 67 IN | BODY MASS INDEX: 23.54 KG/M2 | WEIGHT: 150 LBS

## 2023-02-21 PROCEDURE — 99213 OFFICE O/P EST LOW 20 MIN: CPT | Mod: 95

## 2023-02-21 NOTE — DISCUSSION/SUMMARY
[Medication Risks Reviewed] : Medication risks reviewed [de-identified] : Prescriptions renewed. [place] and [place]. SOAPP-R completed on chart. The patient's medications are documented to the best of their ability. Quality of life and functional ability improved on medications. The patient is showing no aberrant behavior or evidence of diversion. The patient was advised not to use narcotic medication while operating an automobile or heavy machinery due to potential sedation or dizziness. The patient was educated to the risks associated with potential opioid dependence and addiction. Urine toxicology screens as per office protocol. Use of multimodal analgesia used prn.\par Follow up one month.

## 2023-02-21 NOTE — REASON FOR VISIT
[Follow-Up Visit] : a follow-up pain management visit [Home] : at home, [unfilled] , at the time of the visit. [Medical Office: ([facility])___] : at the medical office located in  [Patient] : the patient [Self] : self [FreeTextEntry2] : Neck pain

## 2023-02-21 NOTE — HISTORY OF PRESENT ILLNESS
[Neck] : neck [Gradual] : gradual [8] : 8 [Radiating] : radiating [Stabbing] : stabbing [Throbbing] : throbbing [Constant] : constant [Sleep] : sleep [Rest] : rest [Meds] : meds [Nothing helps with pain getting better] : Nothing helps with pain getting better [Sitting] : sitting [Standing] : standing [Walking] : walking [Bending forward] : bending forward [Disabled] : Work status: disabled [FreeTextEntry1] : S/P Cervical fusion and states feeling better. States less numbness to her left hand. Continues Gabapentin and Pregabalin. We discussed her pain meds and to remain on Oxycodone this month. She supplements with one 500mg Tylenol daily prn. States she still has a knife like pain to the top of her head right side. Follow up with Dr Martínez tomorrow. Managing her ADL and plans to go back to work when cleared.  [] : This patient has had an injection before: no [FreeTextEntry7] : through head behind shoulders and across [de-identified] : 01/10/2023

## 2023-02-21 NOTE — ASSESSMENT
[FreeTextEntry1] : Alert and oriented X3. Wearing hard cervical collar prn. Numbness to hands improved.

## 2023-02-22 ENCOUNTER — APPOINTMENT (OUTPATIENT)
Dept: NEUROSURGERY | Facility: CLINIC | Age: 63
End: 2023-02-22
Payer: MEDICARE

## 2023-02-22 VITALS
WEIGHT: 150 LBS | DIASTOLIC BLOOD PRESSURE: 77 MMHG | TEMPERATURE: 97.7 F | HEART RATE: 93 BPM | HEIGHT: 67 IN | OXYGEN SATURATION: 98 % | BODY MASS INDEX: 23.54 KG/M2 | SYSTOLIC BLOOD PRESSURE: 134 MMHG

## 2023-02-22 DIAGNOSIS — Z78.9 OTHER SPECIFIED HEALTH STATUS: ICD-10-CM

## 2023-02-22 PROCEDURE — 99024 POSTOP FOLLOW-UP VISIT: CPT

## 2023-02-22 RX ORDER — METHOCARBAMOL 750 MG/1
750 TABLET, FILM COATED ORAL EVERY 8 HOURS
Qty: 30 | Refills: 0 | Status: DISCONTINUED | COMMUNITY
Start: 2023-01-23 | End: 2023-02-22

## 2023-03-06 NOTE — REVIEW OF SYSTEMS
[Poor Coordination] : poor coordination [Numbness] : numbness [Negative] : Heme/Lymph [de-identified] : posterior neck pain \par tingling of first three fingers left side

## 2023-03-06 NOTE — CONSULT LETTER
[Dear  ___] : Dear  [unfilled], [Courtesy Letter:] : I had the pleasure of seeing your patient, [unfilled], in my office today. [Sincerely,] : Sincerely, [FreeTextEntry2] : Desean Cornelius MD\par [address] [place] [address]\par [place] [phone]  [FreeTextEntry1] : This very pleasant 62-year-old woman who is 9 weeks post surgery for decompression and fusion of the anterior cervical spine.   To recap, the patient has a longstanding history of degenerative osteoarthritis and distant history of cervical trauma with fracture dislocation.  The patient underwent an anterior cervical discectomy and fusion of C5-6 and C6-7 at [facility] on 10 January 2023 without complication.   This construct now connects to previous fusions at C2-3 and C3-4.  Since surgery the burning pain symptoms of the hands have resolved completely.  The patient initially had some significant throat soreness and some mild difficulty with swallowing which has also resolved.  The patient denies any problems with walking or bowel and bladder function.  Her strength is intact and fine motor skills have not been a problem.\par \par Today the patient reports persistent  tingling and numbness of her first three fingers on her left hand.  The original burning of her hands are completely resolved.  There is posterior neck pain that radiates onto the top of her head.  The pain is daily and constant keeping her awake at night.   The patient takes Tylenol at times when the pain is severe.  She is on standard doses of Gabapentin, Lyrica, Methocarbamol and Oxycodone.  She is taking Valium at night for sleep.   \par \par There are no new images to review.  However, I have reviewed the anatomy of the cervical muscles and occipital nerve that could be contributing to her pain.  \par \par On examination the patient does appear to be in discomfort with respect to neck paraspinal muscle and parascapular region tenderness especially on the right-hand side.  The patient has good shoulder range of motion bilaterally.  There is no objective finding of decreased strength in the upper extremities.  The incision area is healing well with no signs of redness swelling or fluctuance.  The trachea is in the midline.  Voice quality and swallow mechanism are intact.\par \par Unfortunately the patient has some persistent ongoing symptoms that are most likely from arthritis and post operative muscle spasms. Spasms and pain are still expected at this early time after surgery.  The patient is taking Oxycodone, Gabapentin, Lyrica and Methocarbamol and she will continue these medications.  I have recommended the use of a TENS unit and  the patient will focus at the sub occipital area.  The patient will also begin physical therapy with massage to release the spastic muscles.  At this time I would not consider a second surgical procedure from a posterior approach.  the patient will undergo PT and discontinue the use of the collar.  Strategic injections may be considered as well.   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  She will return to the office in one month for re-evaluation.   \par \par Thank you for very kindly including me in the evaluation and ongoing treatment of your patient.  Please do not hesitate to contact me should he have any concerns or questions regarding this evaluation, the patient's recent surgery and cervical fusion, or her ongoing follow-up care plan. [FreeTextEntry3] : Pj Martínez MD, PhD, FRCPSC \par Attending Neurosurgeon \par  of Neurosurgery \par [facility] \par [address]nd floor \[place] [address] \par Office: [phone] \par Fax: [phone]\par \par

## 2023-03-06 NOTE — REASON FOR VISIT
[de-identified] : Anterior cervical diskectomy and fusion at C5-C6 and C6-C7  [de-identified] : 1/10/2023 [de-identified] : 9

## 2023-03-20 ENCOUNTER — APPOINTMENT (OUTPATIENT)
Dept: PAIN MANAGEMENT | Facility: CLINIC | Age: 63
End: 2023-03-20
Payer: MEDICARE

## 2023-03-20 VITALS — WEIGHT: 150 LBS | HEIGHT: 67 IN | BODY MASS INDEX: 23.54 KG/M2

## 2023-03-20 PROCEDURE — 99213 OFFICE O/P EST LOW 20 MIN: CPT | Mod: 95

## 2023-03-20 NOTE — REASON FOR VISIT
[Follow-Up Visit] : a follow-up pain management visit [Home] : at home, [unfilled] , at the time of the visit. [Medical Office: ([facility])___] : at the medical office located in  [Patient] : the patient [Self] : self [FreeTextEntry2] : Neck pain

## 2023-03-20 NOTE — DISCUSSION/SUMMARY
[Medication Risks Reviewed] : Medication risks reviewed [de-identified] : Prescriptions renewed. [place] and [place]. SOAPP-R completed on chart. The patient's medications are documented to the best of their ability. Quality of life and functional ability improved on medications. The patient is showing no aberrant behavior or evidence of diversion. The patient was advised not to use narcotic medication while operating an automobile or heavy machinery due to potential sedation or dizziness. The patient was educated to the risks associated with potential opioid dependence and addiction. Urine toxicology screens as per office protocol. Use of multimodal analgesia used prn.\par Follow up one month.

## 2023-03-20 NOTE — DISCUSSION/SUMMARY
[Medication Risks Reviewed] : Medication risks reviewed [de-identified] : Prescriptions renewed. [place] and [place]. SOAPP-R completed on chart. The patient's medications are documented to the best of their ability. Quality of life and functional ability improved on medications. The patient is showing no aberrant behavior or evidence of diversion. The patient was advised not to use narcotic medication while operating an automobile or heavy machinery due to potential sedation or dizziness. The patient was educated to the risks associated with potential opioid dependence and addiction. Urine toxicology screens as per office protocol. Use of multimodal analgesia used prn.\par Follow up one month.

## 2023-03-20 NOTE — REASON FOR VISIT
[Follow-Up Visit] : a follow-up pain management visit [Home] : at home, [unfilled] , at the time of the visit. [Medical Office: ([facility])___] : at the medical office located in  [Patient] : the patient [Self] : self [FreeTextEntry2] : Neck pain

## 2023-03-22 ENCOUNTER — APPOINTMENT (OUTPATIENT)
Dept: NEUROSURGERY | Facility: CLINIC | Age: 63
End: 2023-03-22
Payer: MEDICARE

## 2023-03-22 VITALS
HEIGHT: 67 IN | DIASTOLIC BLOOD PRESSURE: 80 MMHG | OXYGEN SATURATION: 96 % | WEIGHT: 150 LBS | SYSTOLIC BLOOD PRESSURE: 143 MMHG | BODY MASS INDEX: 23.54 KG/M2 | HEART RATE: 86 BPM

## 2023-03-22 DIAGNOSIS — I73.00 RAYNAUD'S SYNDROME W/OUT GANGRENE: ICD-10-CM

## 2023-03-22 DIAGNOSIS — G56.02 CARPAL TUNNEL SYNDROME, LEFT UPPER LIMB: ICD-10-CM

## 2023-03-22 DIAGNOSIS — M54.81 OCCIPITAL NEURALGIA: ICD-10-CM

## 2023-03-22 DIAGNOSIS — G89.4 CHRONIC PAIN SYNDROME: ICD-10-CM

## 2023-03-22 DIAGNOSIS — M13.0 POLYARTHRITIS, UNSPECIFIED: ICD-10-CM

## 2023-03-22 PROCEDURE — 99214 OFFICE O/P EST MOD 30 MIN: CPT | Mod: 24

## 2023-03-22 NOTE — HISTORY OF PRESENT ILLNESS
[Disabled] : Work status: disabled [Neck] : neck [Gradual] : gradual [8] : 8 [7] : 7 [Radiating] : radiating [Stabbing] : stabbing [Throbbing] : throbbing [Constant] : constant [Sleep] : sleep [Rest] : rest [Meds] : meds [Nothing helps with pain getting better] : Nothing helps with pain getting better [Sitting] : sitting [Standing] : standing [Walking] : walking [Bending forward] : bending forward [Full time] : Work status: full time [FreeTextEntry1] : S/P cervical fusion 1-. States she has returned to work  and finding difficult. Her left hand numbness and pain persist. Also states the knife like pain to the  right side of her head is intermittent and disabling at times. Follow up with Dr Martínez  later this week. She remains on Gabapentin and Pregabalin. She is requesting to resume Norco as she found managed her pain better.  [FreeTextEntry6] : LEFT HAND NUMBNESS [de-identified] : 2023-03-01 [de-identified] : HOME EXERCISES /STRETCHED 5-7X / WEEK  [] : This patient has had an injection before: no [FreeTextEntry7] : through head behind shoulders and across [de-identified] : 01/10/2023

## 2023-03-22 NOTE — HISTORY OF PRESENT ILLNESS
[Disabled] : Work status: disabled [Neck] : neck [Gradual] : gradual [8] : 8 [7] : 7 [Radiating] : radiating [Stabbing] : stabbing [Throbbing] : throbbing [Constant] : constant [Sleep] : sleep [Rest] : rest [Meds] : meds [Nothing helps with pain getting better] : Nothing helps with pain getting better [Sitting] : sitting [Standing] : standing [Walking] : walking [Bending forward] : bending forward [Full time] : Work status: full time [FreeTextEntry1] : S/P cervical fusion 1-. States she has returned to work  and finding difficult. Her left hand numbness and pain persist. Also states the knife like pain to the  right side of her head is intermittent and disabling at times. Follow up with Dr Martínez  later this week. She remains on Gabapentin and Pregabalin. She is requesting to resume Norco as she found managed her pain better.  [FreeTextEntry6] : LEFT HAND NUMBNESS [de-identified] : 2023-03-01 [de-identified] : HOME EXERCISES /STRETCHED 5-7X / WEEK  [] : This patient has had an injection before: no [FreeTextEntry7] : through head behind shoulders and across [de-identified] : 01/10/2023

## 2023-03-30 NOTE — H&P PST ADULT - RESPIRATORY AND THORAX
negative Intermediate Repair And Graft Additional Text (Will Appearing After The Standard Complex Repair Text): The intermediate repair was not sufficient to completely close the primary defect. The remaining additional defect was repaired with the graft mentioned below.

## 2023-04-02 NOTE — CONSULT LETTER
[Dear  ___] : Dear  [unfilled], [Courtesy Letter:] : I had the pleasure of seeing your patient, [unfilled], in my office today. [Sincerely,] : Sincerely, [FreeTextEntry2] : Desean Cornelius MD\par  [address] [place] [address]\par  [place] [id number]    [FreeTextEntry1] : This very pleasant 62-year-old woman is 2 weeks post surgery for decompression and fusion of the anterior cervical spine.  As you know the patient has a longstanding history of degenerative osteoarthritis and past history of cervical trauma with fracture dislocation.  Because of progressive radiculopathy at C5-6 and C6-7 the patient underwent an anterior cervical discectomy and fusion at [facility] on 10 January 2023 without complication.  This construct now connects to previous fusions at C2-3 and C3-4.  Following surgery the patient indicates that her burning bilateral hand symptoms have resolved completely.  The patient initially had some significant throat soreness and some mild difficulty with swallowing but this has also improved significantly.  The patient's voice quality is normal.  The patient has used a Medrol dose pack for generalized neck pain as well as some delayed recurrence of radicular symptoms particularly to the right-hand side in the parascapular region and into the left triceps area.  The patient denies any problems with walking or bowel and bladder function.  Her strength is intact and fine motor skills have not been a problem.\par \par I have reviewed with the patient as well as her significant other who is present with her the post surgery CT scan.  This shows good decompression at C5-6 and C6-7 and good positioning of the zero profile Synthes construct.  There may be some residual osteophyte on the right-hand side at C5-6 but the foramen is certainly more open compared to pre- surgery.\par \par On examination the patient does appear to be in discomfort with respect to neck paraspinal muscle and parascapular region tenderness especially on the right-hand side.  The patient has good shoulder range of motion bilaterally.  There is no objective finding of decreased strength in the upper extremities.  Krish sign is negative.  The incision area is healing well with no signs of redness swelling or fluctuance.  The trachea is in the midline.  Voice quality and swallow mechanism are intact.\par \par Overall I am very pleased with the patient's early recovery after significant surgery.  The recurrent radicular pain after initially being symptom-free suggest that this represents postsurgical inflammation and swelling.  I have recommended a second cycle of Medrol Dosepak.  The patient has been off arthritic pain medication and I have recommended that an occasional Aleve or use of ibuprofen with may be of significant benefit for symptom control even though this may delay the fusion progress.  The patient is to continue to wear her cervical collar.  I have referred the patient to our orthotist to see if we can find a better fitting collar to support her neck during healing.  The patient should continue to work on shoulder general range of motion while her fusion completes.  I will see the patient again in 4 weeks time to evaluate her readiness to move forward with physical therapy.\par \par Thank you for very kindly including me in the evaluation and ongoing treatment of your patient.  Please do not hesitate to contact me should he have any concerns or questions regarding this evaluation, the patient's recent surgery and cervical fusion, or her ongoing follow-up care plan. [FreeTextEntry3] : Pj Martínez MD, PhD, FRCPSC                           \par Attending Neurosurgeon \par  of Neurosurgery \par [facility] \par [address], 2nd floor \[place] [address] \par Office: [phone] \par Fax: [phone]\par

## 2023-04-02 NOTE — REASON FOR VISIT
[Friend] : friend [de-identified] : 1/10/2023 [de-identified] : ACDF C5/6 & C6/7 [de-identified] : 2

## 2023-04-17 ENCOUNTER — APPOINTMENT (OUTPATIENT)
Dept: PAIN MANAGEMENT | Facility: CLINIC | Age: 63
End: 2023-04-17
Payer: MEDICARE

## 2023-04-17 ENCOUNTER — RX RENEWAL (OUTPATIENT)
Age: 63
End: 2023-04-17

## 2023-04-17 PROCEDURE — ZZZZZ: CPT | Mod: 1L

## 2023-04-17 NOTE — DISCUSSION/SUMMARY
[Medication Risks Reviewed] : Medication risks reviewed [de-identified] : Prescriptions renewed. [place] and [place]. SOAPP-R completed on chart. The patient's medications are documented to the best of their ability. Quality of life and functional ability improved on medications. The patient is showing no aberrant behavior or evidence of diversion. The patient was advised not to use narcotic medication while operating an automobile or heavy machinery due to potential sedation or dizziness. The patient was educated to the risks associated with potential opioid dependence and addiction. Urine toxicology screens as per office protocol. Use of multimodal analgesia used prn.\par Follow up one month.

## 2023-04-17 NOTE — REASON FOR VISIT
[Follow-Up Visit] : a follow-up pain management visit [Home] : at home, [unfilled] , at the time of the visit. [Medical Office: ([facility])___] : at the medical office located in  [Patient] : the patient [Self] : self [FreeTextEntry2] : Neck pain

## 2023-04-17 NOTE — HISTORY OF PRESENT ILLNESS
[Gradual] : gradual [Neck] : neck [8] : 8 [7] : 7 [Radiating] : radiating [Stabbing] : stabbing [Throbbing] : throbbing [Constant] : constant [Sleep] : sleep [Rest] : rest [Meds] : meds [Nothing helps with pain getting better] : Nothing helps with pain getting better [Sitting] : sitting [Standing] : standing [Bending forward] : bending forward [Walking] : walking [Full time] : Work status: full time [FreeTextEntry1] : States she has more episodes of severe right sided knife like pain to her head which affects her left hand. She saw Dr Martínez and to  see Dr Luis 4-. She states possibility of further surgery.  She continues working now only three days a week. Says  weather changes cause  severe onset of her pain. She is not sleeping. Pain meds, Pregabalin and Gabapentin effective at times.  [] : This patient has had an injection before: no [FreeTextEntry6] : LEFT HAND NUMBNESS [FreeTextEntry7] : through head behind shoulders and across [de-identified] : 01/10/2023 [de-identified] : 2023-03-01 [de-identified] : HOME EXERCISES /STRETCHED 5-7X / WEEK

## 2023-04-24 ENCOUNTER — APPOINTMENT (OUTPATIENT)
Dept: PAIN MANAGEMENT | Facility: CLINIC | Age: 63
End: 2023-04-24
Payer: MEDICARE

## 2023-04-24 VITALS — HEIGHT: 67 IN | WEIGHT: 150 LBS | BODY MASS INDEX: 23.54 KG/M2

## 2023-04-24 PROCEDURE — 99204 OFFICE O/P NEW MOD 45 MIN: CPT

## 2023-04-24 NOTE — HISTORY OF PRESENT ILLNESS
[FreeTextEntry1] : The patient presents for initial evaluation regarding their neck pain.  Patient was referred by Dr. Martínez.  Patient is 3 months post op after a C5-C7 ACDF on 1/10/2023 with Dr. Martínez.  This was her third cervical spine surgery over the past 7-8 years.  Currently pain is in the patients neck with radiation into the right side crown of the head.  This is her predominant pain complaint.  She also reports ongoing neck pain with radiation down the left arm, dysesthesias in the left hand.  Patient takes 3300 mg gabapentin daily, methocarbamol PRN, and pregabalin. Patient does a HEP and TENS therapy for pain management.\par \par Subjective weakness: Yes\par Lower extremity paresthesias: Yes\par Bladder/bowel dysfunction: No \par \par Injections: No \par \par Pertinent Surgical History:\par 1) C3-4 posterior spinal fusion (2015) - [facility]\par 2) C4-C5 ACDF - (10/17/16) - Dr. Martínez\par 3) C5-C6, C6-C7 ACDF (1/10/2023) - Dr. 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 There is osseous fusion of the facet joints with mild to moderate hypertrophy.\par C3-C4: Posterior spinal fusion hardware is present without spinal canal stenosis. Mild left neural foraminal stenosis is present. There is osseous fusion of both facet joints with mild to moderate hypertrophy.\par C4-C5: Anterior spinal fusion hardware is present with osseous fusion of the vertebral bodies. There is no spinal canal stenosis. Mild left neural foraminal stenosis is present.\par C5-C6: Postsurgical changes are present with residual posterior osteophytic ridging and mild-to-moderate spinal canal stenosis. Moderate bilateral neural foraminal stenosis is present.\par C6-C7: Postsurgical changes are present with residual posterior osteophytic ridging and mild spinal canal stenosis. Mild to moderate bilateral neural foraminal stenosis is present.\par C7-T1: Severe loss of disc height is present with posterior disc osteophyte complex and mild spinal canal stenosis. Mild to moderate right neural foraminal stenosis is present. There is mild left facet arthrosis.\par T1-T2: Small central disc protrusion is present that may contact the ventral cord without significant stenosis. There is mild left facet arthrosis.\par \par 2) MRI Lumbar Spine (11/7/2022) - [facility]\par   \par Physician Disclaimer: I have personally reviewed and confirmed all HPI data with the patient.

## 2023-04-24 NOTE — PHYSICAL EXAM
[de-identified] : Constitutional:  \par - No acute distress  \par - Well developed; well nourished  \par \par Neurological:  \par - normal mood and affect  \par - alert and oriented x 3   \par \par Cardiovascular:  \par - grossly normal \par \par Cervical Spine Exam: \par \par Inspection:\par erythema (-)  \par ecchymosis (-)  \par rashes (-)  \par Well healed anterior and posterior midline scar\par \par Palpation:                                                   \par Cervical paraspinal tenderness:         R (-); L(-) \par Upper trapezius tenderness:              R (-); L (-) \par Rhomboids tenderness:                      R (-); L (-) \par Occipital Ridge:                                    R (+); L (-) \par Supraspinatus tenderness:                 R (-); L (-) \par \par ROM: Reduced ROM all planes\par \par Strength Testing:             \par Deltoid                           R (5/5); L (5/5) \par Biceps:                          R (5/5); L (5/5) \par Triceps:                         R (5/5); L (5/5) \par Finger Abductors:         R (5/5); L (5/5) \par Grasp:                           R (5/5); L (5/5) \par \par Special Testing: \par Spurling Test:                  R (-); L (eq) \par Facet load test:               R (-); L (-) \par \par Neuro: \par SILT throughout right upper extremity \par SILT throughout left upper extremity \par \par Reflexes: \par Biceps   -           R (2+); L (2+) \par Triceps  -           R (2+); L (2+) \par Brachioradialis- R (2+); L (2+)   \par \par No ankle clonus \par Mildly antalgic Gait

## 2023-04-24 NOTE — ASSESSMENT
[FreeTextEntry1] : A discussion regarding available pain management treatment options occurred with the patient.  These included interventional, rehabilitative, pharmacological, and alternative modalities. We will proceed with the following:  \par \par Interventional treatment options:  \par - Proceed with right occipital nerve block with ultrasound guidance\par - Explained diagnostic and therapeutic role for indicated procedure\par - Also discussed goals for indicated procedure of reduction of headache intensity and frequency\par - see additional instructions below  \par \par Rehabilitative options:  \par - Consider trial of physical therapy once adequate relief\par - participation in active HEP was discussed  \par \par Medication based treatment options:  \par - Medication therapy as per primary pain physician\par - Patient currently on gabapentin 3300 mg daily, Lyrica 50 mg BID\par - Consider addition of Cymbalta\par - see additional instructions below  \par \par Complementary treatment options:  \par - lifestyle modifications discussed  \par - Continue TENS therapy\par \par Additional treatment recommendations as follows:  \par - patient will follow up with Dr. Martínez as directed\par - Follow up 1-2 weeks post injection for assessment of efficacy and further treatment recommendations\par \par The risks, benefits and alternatives of the proposed procedure were explained in detail with the patient.  The risks outlined include, but are not limited to, infection, bleeding, nerve injury, a temporary increase in pain, failure to resolve symptoms, allergic reaction, and possible elevation of blood sugar in diabetics.  All questions were answered to patient's apparent satisfaction and he/she verbalized an understanding.\par \par The documentation recorded by the scribe, in my presence, accurately reflects the service I personally performed and the decisions made by me with my edits as appropriate. \par \par I, Venkata Jeter acting as scribe, attest that this documentation has been prepared under the direction and in the presence of Provider Tesfaye Luis DO.

## 2023-05-15 ENCOUNTER — APPOINTMENT (OUTPATIENT)
Dept: PAIN MANAGEMENT | Facility: CLINIC | Age: 63
End: 2023-05-15
Payer: MEDICARE

## 2023-05-15 DIAGNOSIS — M50.90 CERVICAL DISC DISORDER, UNSPECIFIED, UNSPECIFIED CERVICAL REGION: ICD-10-CM

## 2023-05-15 PROCEDURE — 99213 OFFICE O/P EST LOW 20 MIN: CPT | Mod: 95

## 2023-05-15 RX ORDER — OXYCODONE 10 MG/1
10 TABLET ORAL EVERY 6 HOURS
Qty: 28 | Refills: 0 | Status: DISCONTINUED | COMMUNITY
Start: 2023-01-23 | End: 2023-05-15

## 2023-05-15 RX ORDER — HYDROCODONE BITARTRATE AND ACETAMINOPHEN 10; 325 MG/1; MG/1
10-325 TABLET ORAL
Qty: 180 | Refills: 0 | Status: DISCONTINUED | COMMUNITY
Start: 2023-03-20 | End: 2023-05-15

## 2023-05-15 RX ORDER — PREGABALIN 50 MG/1
50 CAPSULE ORAL TWICE DAILY
Qty: 180 | Refills: 0 | Status: DISCONTINUED | COMMUNITY
Start: 2023-01-24 | End: 2023-05-15

## 2023-05-15 RX ORDER — OXYCODONE 10 MG/1
10 TABLET ORAL
Qty: 120 | Refills: 0 | Status: DISCONTINUED | COMMUNITY
Start: 2023-02-21 | End: 2023-05-15

## 2023-05-15 RX ORDER — OXYCODONE 10 MG/1
10 TABLET ORAL
Qty: 120 | Refills: 0 | Status: DISCONTINUED | COMMUNITY
Start: 2023-01-24 | End: 2023-05-15

## 2023-05-15 NOTE — HISTORY OF PRESENT ILLNESS
[Neck] : neck [Gradual] : gradual [8] : 8 [7] : 7 [Radiating] : radiating [Stabbing] : stabbing [Throbbing] : throbbing [Constant] : constant [Sleep] : sleep [Rest] : rest [Meds] : meds [Nothing helps with pain getting better] : Nothing helps with pain getting better [Sitting] : sitting [Standing] : standing [Walking] : walking [Bending forward] : bending forward [Full time] : Work status: full time [FreeTextEntry1] : Neck and right sided head pain severe. Awaiting response from Dr Martínez to discuss her options. States now her right hand numbness has increased in severity. States worse when  lying down. Pain meds and neuropathic meds effective at times. She is frustrated and hoping for more sustained pain relief.  [] : This patient has had an injection before: no [FreeTextEntry6] : LEFT HAND NUMBNESS [FreeTextEntry7] : through head behind shoulders and across [de-identified] : 01/10/2023 [de-identified] : 2023-03-01 [de-identified] : HOME EXERCISES /STRETCHED 5-7X / WEEK

## 2023-05-15 NOTE — ASSESSMENT
[FreeTextEntry1] : Alert and oriented  X 3. States numbness to  both hands more prominent when lying down.

## 2023-05-15 NOTE — DISCUSSION/SUMMARY
[Medication Risks Reviewed] : Medication risks reviewed [de-identified] : Prescriptions renewed. [place] and [place]. SOAPP-R completed on chart. The patient's medications are documented to the best of their ability. Quality of life and functional ability improved on medications. The patient is showing no aberrant behavior or evidence of diversion. The patient was advised not to use narcotic medication while operating an automobile or heavy machinery due to potential sedation or dizziness. The patient was educated to the risks associated with potential opioid dependence and addiction. Urine toxicology screens as per office protocol. Use of multimodal analgesia used prn.\par Follow up one month.

## 2023-05-15 NOTE — REASON FOR VISIT
[Follow-Up Visit] : a follow-up pain management visit [Home] : at home, [unfilled] , at the time of the visit. [Medical Office: ([facility])___] : at the medical office located in  [Patient] : the patient [Self] : self [FreeTextEntry2] : MED REFILL

## 2023-05-21 PROBLEM — M13.0 POLYARTICULAR ARTHRITIS: Status: ACTIVE | Noted: 2021-06-28

## 2023-05-21 PROBLEM — G89.4 CHRONIC PAIN SYNDROME: Status: ACTIVE | Noted: 2023-01-10

## 2023-05-21 PROBLEM — M54.81 OCCIPITAL NEURALGIA OF RIGHT SIDE: Status: ACTIVE | Noted: 2023-04-24

## 2023-05-21 NOTE — CONSULT LETTER
[Dear  ___] : Dear  [unfilled], [Courtesy Letter:] : I had the pleasure of seeing your patient, [unfilled], in my office today. [Sincerely,] : Sincerely, [FreeTextEntry2] : Desean Cornelius MD\par [address] [place] [address]\par [place] [id number]  [FreeTextEntry1] : This very pleasant now 63-year-old woman has an extensive and complicated polyarthritis history.  She is RANI positive.  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  The patient is also diagnosed with Raynaud's syndrome.  The patient's surgical intervention history is extensive including a posterior C3-4 decompression and fusion procedure performed in 2011 at [facility].  She also had right shoulder debridement and surgery in September 2011.  Unfortunately she developed progressive adjacent segment cervical degeneration associated with dynamic instability and therefore underwent an anterior cervical discectomy and fusion at C4-5.  The patient for a significant period had resolution of her neck pain.  However, EMG nerve conduction studies back in 2016 also identified radiculopathy affecting C5-6 and C6-7 as well as left ulnar neuropathy and left greater than right carpal tunnel syndrome.  The patient has pursued extensive conservative measures with respect to her other complaints.  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  Much of the sharp radicular pain has resolved.  However the patient still has numbness and tingling in the left hand.  She is also complaining of headaches which radiate into the suboccipital and posterior region especially on the right-hand side.  The patient is currently using Norco as her primary pain medication under the direction of her pain management team.\par \par There is no new imaging to review today.  The CT and MRI images both pre and post surgery were reviewed.  The fusion construct is stable.\par \par On examination the incision area has healed well.  The patient's voice quality and swallow mechanism are normal.  The patient indicates significant pain in the suboccipital region.  Her range of motion is limited and attempts at range of motion are painful.\par \par I discussed with the patient once again that her pain profile is multifaceted.  I am concerned as it was before surgery that the additional fusion associated with the decompression would further create mechanical changes in her neck mobility which could cause new or different pain.  I have recommended the patient would continue with physical therapy efforts to assist with muscle relaxation and improve range of motion.  The patient is aware that she will need to participate in maintenance treatment plan.  The patient will be seen by her pain management team for medication suggestions but also consideration of occipital nerve block.  It is possible that consideration of Botox or other trigger point modality may be appropriate.  Ultimately the patient may need to consider a pain stimulator or other similar modality for more consistent relief of pain.  I have not addressed the prior finding of carpal tunnel syndrome or ulnar neuropathy with the patient today.  We will address that pending the discussions around her current pain control.\par \par Thank you for kindly including me in the ongoing evaluation and support of your patient.  Please do not hesitate to contact me should you have any questions or concerns regarding the patient's recent surgery, this evaluation, or her ongoing follow-up care plan. [FreeTextEntry3] : Pj Martínez MD, PhD, FRCPSC \par Attending Neurosurgeon \par  of Neurosurgery \par [facility] \par [address]nd floor \[place] [address] \par Office: [phone] \par Fax: [phone]\par \par

## 2023-06-07 ENCOUNTER — NON-APPOINTMENT (OUTPATIENT)
Age: 63
End: 2023-06-07

## 2023-06-07 DIAGNOSIS — M79.602 PAIN IN LEFT ARM: ICD-10-CM

## 2023-06-12 RX ORDER — HYDROCODONE BITARTRATE AND ACETAMINOPHEN 10; 325 MG/1; MG/1
10-325 TABLET ORAL
Qty: 180 | Refills: 0 | Status: DISCONTINUED | COMMUNITY
Start: 2023-04-17 | End: 2023-06-12

## 2023-06-13 ENCOUNTER — APPOINTMENT (OUTPATIENT)
Dept: PAIN MANAGEMENT | Facility: CLINIC | Age: 63
End: 2023-06-13
Payer: MEDICARE

## 2023-06-13 VITALS — WEIGHT: 150 LBS | BODY MASS INDEX: 23.54 KG/M2 | HEIGHT: 67 IN

## 2023-06-13 PROCEDURE — 99213 OFFICE O/P EST LOW 20 MIN: CPT

## 2023-06-13 NOTE — ASSESSMENT
[FreeTextEntry1] : Alert and oriented X 3. Severely restricted cervical  ROM. Altered sensation to left hand.

## 2023-06-13 NOTE — HISTORY OF PRESENT ILLNESS
[Neck] : neck [Gradual] : gradual [6] : 6 [Burning] : burning [Radiating] : radiating [Sharp] : sharp [Shooting] : shooting [Stabbing] : stabbing [Tingling] : tingling [Constant] : constant [Sleep] : sleep [Rest] : rest [Meds] : meds [Heat] : heat [Nothing helps with pain getting better] : Nothing helps with pain getting better [Sitting] : sitting [Standing] : standing [Walking] : walking [Bending forward] : bending forward [Full time] : Work status: full time [FreeTextEntry1] : Chronic neck pain. Meds effective. Sleeping an issue and to add Morphine ER 15mg  as she had been on in the past and effective. To see Dr Martínez in July after an updated Cervical MRI. She continues working. [] : This patient has had an injection before: no [FreeTextEntry6] : LEFT HAND NUMBNESS BURNING AND TINGLING WORSE AT NIGHT , PUSHING PAIN  [FreeTextEntry7] : HEAD DOWN LEFT ARM  [FreeTextEntry9] : LAYING DOWN  [de-identified] : 01/10/2023 [de-identified] : 2023-03-01 [de-identified] : PT WENT TO PHYSICAL THERAPY FROM 2023-02-01 UNTIL 2023-03-01 GOING FOR 2X WEEK \par PT IS CONTINUING WITH HOME EXERCISES /STRETCHED 5-7X / WEEK , HELPING BY STRENGTHENING

## 2023-06-13 NOTE — DISCUSSION/SUMMARY
[Medication Risks Reviewed] : Medication risks reviewed [de-identified] : Prescriptions renewed. States she benefitted from Morphine ER 15mg at bedtime in the past and able to sleep with less pain. [place] agreement/obtained on [place]  reviewed and appropriate. SOAPP-R completed on chart. The patient's medications are documented to the best of their ability. Quality of life and functional ability improved on medications. The patient is showing no aberrant behavior or evidence of diversion. The patient was advised not to use narcotic medication while operating an automobile or heavy machinery due to potential sedation or dizziness. The patient was educated to the risks associated with potential opioid dependence and addiction. Urine toxicology screens as per office protocol. Use of multimodal analgesia used prn.\par Follow up one month.

## 2023-07-10 ENCOUNTER — APPOINTMENT (OUTPATIENT)
Dept: PAIN MANAGEMENT | Facility: CLINIC | Age: 63
End: 2023-07-10
Payer: MEDICARE

## 2023-07-10 ENCOUNTER — APPOINTMENT (OUTPATIENT)
Dept: PAIN MANAGEMENT | Facility: CLINIC | Age: 63
End: 2023-07-10

## 2023-07-10 VITALS — HEIGHT: 67 IN | WEIGHT: 150 LBS | BODY MASS INDEX: 23.54 KG/M2

## 2023-07-10 PROCEDURE — 99214 OFFICE O/P EST MOD 30 MIN: CPT | Mod: 95

## 2023-07-10 RX ORDER — METHOCARBAMOL 750 MG/1
750 TABLET, FILM COATED ORAL TWICE DAILY
Qty: 180 | Refills: 0 | Status: ACTIVE | COMMUNITY
Start: 2023-02-23 | End: 1900-01-01

## 2023-07-10 NOTE — DISCUSSION/SUMMARY
[Medication Risks Reviewed] : Medication risks reviewed [de-identified] : Prescriptions renewed. [place]id agreement/obtained on [place]  reviewed and appropriate. SOAPP-R completed on chart. The patient's medications are documented to the best of their ability. Quality of life and functional ability improved on medications. The patient is showing no aberrant behavior or evidence of diversion. The patient was advised not to use narcotic medication while operating an automobile or heavy machinery due to potential sedation or dizziness. The patient was educated to the risks associated with potential opioid dependence and addiction. Urine toxicology screens as per office protocol. Use of multimodal analgesia used prn.\par Follow up one month.\par

## 2023-07-10 NOTE — REASON FOR VISIT
[Home] : at home, [unfilled] , at the time of the visit. [Medical Office: ([facility])___] : at the medical office located in  [Patient] : the patient [Self] : self [FreeTextEntry2] : med refill

## 2023-07-10 NOTE — HISTORY OF PRESENT ILLNESS
[Neck] : neck [Gradual] : gradual [7] : 7 [6] : 6 [Radiating] : radiating [Sharp] : sharp [Stabbing] : stabbing [Constant] : constant [Sleep] : sleep [Rest] : rest [Meds] : meds [Heat] : heat [Nothing helps with pain getting better] : Nothing helps with pain getting better [Sitting] : sitting [Standing] : standing [Walking] : walking [Bending forward] : bending forward [Full time] : Work status: full time [de-identified] : States she had a  Cervical MRI 7- and to see Dr Mauricio friedman 7-. States persistent episodes for severe weighted pressure to her posterior neck and stabbing, numbing pain to her left arm/hand. She continues working part-time and states she has to stop and lean up against a wall until resolves. Pain meds helpful .  [] : This patient has had an injection before: no [FreeTextEntry6] : LEFT HAND NUMBNESS , headaches  [FreeTextEntry7] : HEAD DOWN LEFT ARM  [FreeTextEntry9] : LAYING DOWN  [de-identified] : 01/10/2023 [de-identified] : 2023-03-01 [de-identified] : mri 2023-07-08 cervical  [de-identified] : PT WENT TO PHYSICAL THERAPY FROM 2023-02-01 UNTIL 2023-03-01 GOING FOR 2X WEEK \par as of [id number] IS CONTINUING WITH HOME EXERCISES /STRETCHED 5-7X / WEEK , HELPING BY STRENGTHENING

## 2023-07-10 NOTE — ASSESSMENT
[FreeTextEntry1] : Alert and oriented X3 . Cervical ROM severely restricted. States altered sensation to hands  bilaterally.

## 2023-07-12 ENCOUNTER — APPOINTMENT (OUTPATIENT)
Dept: NEUROSURGERY | Facility: CLINIC | Age: 63
End: 2023-07-12
Payer: MEDICARE

## 2023-07-12 VITALS
SYSTOLIC BLOOD PRESSURE: 89 MMHG | HEIGHT: 67 IN | WEIGHT: 150 LBS | OXYGEN SATURATION: 96 % | DIASTOLIC BLOOD PRESSURE: 60 MMHG | BODY MASS INDEX: 23.54 KG/M2 | HEART RATE: 88 BPM

## 2023-07-12 DIAGNOSIS — M47.812 SPONDYLOSIS W/OUT MYELOPATHY OR RADICULOPATHY, CERVICAL REGION: ICD-10-CM

## 2023-07-12 DIAGNOSIS — M79.642 PAIN IN LEFT HAND: ICD-10-CM

## 2023-07-12 DIAGNOSIS — M48.02 SPINAL STENOSIS, CERVICAL REGION: ICD-10-CM

## 2023-07-12 PROCEDURE — 99214 OFFICE O/P EST MOD 30 MIN: CPT

## 2023-07-13 PROBLEM — M79.642 PAIN OF LEFT HAND: Status: ACTIVE | Noted: 2021-06-14

## 2023-07-13 NOTE — CONSULT LETTER
[Dear  ___] : Dear  [unfilled], [Courtesy Letter:] : I had the pleasure of seeing your patient, [unfilled], in my office today. [Sincerely,] : Sincerely, [FreeTextEntry2] : Desean Cornelius MD\par [address] [place] [address]\par [place] [id number]  [FreeTextEntry1] : This very pleasant 63-year-old woman is well-known to our service for extensive degenerative spondylosis of the cervical spine secondary to arthritis and prior trauma.  The patient has previously undergone several procedures involving the cervical spine including posterior fusion at C2-3 followed by an anterior approach to C3-4.  Most recently she underwent decompression and fusion from an anterior approach at C5-6 and C6-7.  Despite the decompression the patient has persistent neck pain and increasing problems with left arm radiating symptoms including pain and dexterity problems affecting the entire left hand.  The patient has now undergone an updated MRI scan and returns to discuss treatment options.\par \par I have reviewed directly with the patient her postoperative CT scan which shows extensive degenerative spondylosis and intact decompression and fusion hardware.  The MRI scan identifies persistent narrowing in the uncovertebral regions and adjacent to the facet complexes at C5-6 and to a lesser degree but still clinically important degree at C6-7.  Both findings correlate well with the patient's symptom profile.\par \par The patient continues to have distribution of pain consistent with a C5-6 and C6-7 radiculopathy on the left-hand side.  There are sensory changes to pinprick and cool temperature in the same distribution.  Phalen's test and Tinel's test are negative for carpal tunnel syndrome on the left.  The patient has expected restricted neck range of motion.  Her shoulder dexterity is normal.\par \par The patient's MRI and CT scan demonstrate some persistent far lateral narrowing of the foramen on the left-hand side.  The patient has undergone appropriate conservative measures and there is no specific pain management intervention that would be appropriate for definitive treatment.  The patient is therefore appropriate for consideration of a minimally invasive left-sided laminotomy-foraminotomies from a posterior approach at C5-6 and C6-7 on the left-hand side only.  Using surgical models as well as the patient's own imaging I have reviewed this surgical technique in detail.  The patient has indicated that there is a family wedding occurring within the next 2 months and she would like to defer treatment until after that is completed.  The patient will continue to work with pain management in the interval.\par \par Thank you for very kindly including me in the ongoing evaluation and support of your patient.  Please do not hesitate to contact me should you have any questions or concerns regarding the patient's most recent imaging studies identifying compression of the left exiting nerve roots at C5-6 and C6-7 which correlates well with the patient's persistent and ongoing symptoms.  Recommendation has been made for surgical decompression from a posterior approach. [FreeTextEntry3] : Pj Martínez MD, PhD, FRCPSC \par Attending Neurosurgeon \par  of Neurosurgery \par [facility] \par [address], [address]nd floor \[place] [address] \par Office: [phone] \par Fax: [phone]\par \par

## 2023-08-07 ENCOUNTER — APPOINTMENT (OUTPATIENT)
Dept: PAIN MANAGEMENT | Facility: CLINIC | Age: 63
End: 2023-08-07
Payer: MEDICARE

## 2023-08-07 VITALS — HEIGHT: 67 IN | BODY MASS INDEX: 23.54 KG/M2 | WEIGHT: 150 LBS

## 2023-08-07 PROCEDURE — 99213 OFFICE O/P EST LOW 20 MIN: CPT | Mod: 95

## 2023-08-07 NOTE — HISTORY OF PRESENT ILLNESS
[Neck] : neck [Gradual] : gradual [8] : 8 [6] : 6 [Radiating] : radiating [Sharp] : sharp [Stabbing] : stabbing [Constant] : constant [Sleep] : sleep [Rest] : rest [Meds] : meds [Heat] : heat [Nothing helps with pain getting better] : Nothing helps with pain getting better [Sitting] : sitting [Standing] : standing [Walking] : walking [Bending forward] : bending forward [Full time] : Work status: full time [de-identified] : Chronic neck pain.  [facility] plans to have another  cervical surgery with Dr Martínez. [facility] was planned for September but she is having other GYN health issues and to address them first.  Pain meds effective most days. She continues working.  [] : This patient has had an injection before: no [FreeTextEntry6] : LEFT HAND NUMBNESS , headaches , weight feeling in neck rt side  [FreeTextEntry7] : HEAD DOWN LEFT ARM  [FreeTextEntry9] : LAYING DOWN  [de-identified] : 01/10/2023 [de-identified] : 2023-03-01 [de-identified] : mri 2023-07-08 cervical  [de-identified] : PT WENT TO PHYSICAL THERAPY FROM 2023-02-01 UNTIL 2023-03-01 GOING FOR 2X WEEK  as of 2023-08-07 PT IS CONTINUING WITH HOME EXERCISES /STRETCHED 5-7X / WEEK , HELPING BY STRENGTHENING

## 2023-08-07 NOTE — DISCUSSION/SUMMARY
[Medication Risks Reviewed] : Medication risks reviewed [de-identified] : Prescriptions renewed. Opioid agreement/obtained on chart [place]  reviewed and appropriate. SOAPP-R completed on chart. The patient's medications are documented to the best of their ability. Quality of life and functional ability improved on medications. The patient is showing no aberrant behavior or evidence of diversion. The patient was advised not to use narcotic medication while operating an automobile or heavy machinery due to potential sedation or dizziness. The patient was educated to the risks associated with potential opioid dependence and addiction. Urine toxicology screens as per office protocol. Use of multimodal analgesia used prn. Follow up one month.

## 2023-08-07 NOTE — REASON FOR VISIT
[Home] : at home, [unfilled] , at the time of the visit. [Medical Office: ([facility])___] : at the medical office located in  [Patient] : the patient [Self] : self [FreeTextEntry2] : med refill

## 2023-09-06 ENCOUNTER — APPOINTMENT (OUTPATIENT)
Dept: PAIN MANAGEMENT | Facility: CLINIC | Age: 63
End: 2023-09-06
Payer: MEDICARE

## 2023-09-06 PROCEDURE — 99213 OFFICE O/P EST LOW 20 MIN: CPT | Mod: 95

## 2023-09-06 RX ORDER — HYDROCODONE BITARTRATE AND ACETAMINOPHEN 10; 325 MG/1; MG/1
10-325 TABLET ORAL
Qty: 210 | Refills: 0 | Status: DISCONTINUED | COMMUNITY
Start: 2017-09-26 | End: 2023-09-06

## 2023-09-06 NOTE — HISTORY OF PRESENT ILLNESS
[Neck] : neck [Gradual] : gradual [8] : 8 [6] : 6 [Radiating] : radiating [Sharp] : sharp [Stabbing] : stabbing [Constant] : constant [Sleep] : sleep [Rest] : rest [Meds] : meds [Heat] : heat [Nothing helps with pain getting better] : Nothing helps with pain getting better [Sitting] : sitting [Standing] : standing [Walking] : walking [Bending forward] : bending forward [Full time] : Work status: full time [Home] : at home, [unfilled] , at the time of the visit. [Medical Office: ([facility])___] : at the medical office located in  [Verbal consent obtained from patient] : the patient, [unfilled] [] : This patient has had an injection before: no [FreeTextEntry6] : LEFT HAND NUMBNESS , headaches , weight feeling in neck rt side  [FreeTextEntry7] : HEAD DOWN LEFT ARM  [FreeTextEntry9] : LAYING DOWN  [de-identified] : 01/10/2023 [de-identified] : 2023-03-01 [de-identified] : mri 2023-07-08 cervical  [de-identified] : PT WENT TO PHYSICAL THERAPY FROM 2023-02-01 UNTIL 2023-03-01 GOING FOR 2X WEEK  as of 2023-08-07 PT IS CONTINUING WITH HOME EXERCISES /STRETCHED 5-7X / WEEK , HELPING BY STRENGTHENING

## 2023-09-14 ENCOUNTER — RX RENEWAL (OUTPATIENT)
Age: 63
End: 2023-09-14

## 2023-09-15 ENCOUNTER — RX RENEWAL (OUTPATIENT)
Age: 63
End: 2023-09-15

## 2023-10-04 ENCOUNTER — APPOINTMENT (OUTPATIENT)
Dept: PAIN MANAGEMENT | Facility: CLINIC | Age: 63
End: 2023-10-04
Payer: MEDICARE

## 2023-10-04 VITALS — BODY MASS INDEX: 23.54 KG/M2 | WEIGHT: 150 LBS | HEIGHT: 67 IN

## 2023-10-04 DIAGNOSIS — Z98.1 ARTHRODESIS STATUS: ICD-10-CM

## 2023-10-04 PROCEDURE — 99213 OFFICE O/P EST LOW 20 MIN: CPT | Mod: 95

## 2023-10-23 ENCOUNTER — TRANSCRIPTION ENCOUNTER (OUTPATIENT)
Age: 63
End: 2023-10-23

## 2023-10-23 ENCOUNTER — APPOINTMENT (OUTPATIENT)
Dept: UROGYNECOLOGY | Facility: CLINIC | Age: 63
End: 2023-10-23
Payer: MEDICARE

## 2023-10-23 VITALS
WEIGHT: 150 LBS | HEIGHT: 67 IN | DIASTOLIC BLOOD PRESSURE: 90 MMHG | SYSTOLIC BLOOD PRESSURE: 142 MMHG | BODY MASS INDEX: 23.54 KG/M2 | HEART RATE: 82 BPM

## 2023-10-23 DIAGNOSIS — N81.2 INCOMPLETE UTEROVAGINAL PROLAPSE: ICD-10-CM

## 2023-10-23 DIAGNOSIS — N81.6 RECTOCELE: ICD-10-CM

## 2023-10-23 DIAGNOSIS — N39.3 STRESS INCONTINENCE (FEMALE) (MALE): ICD-10-CM

## 2023-10-23 DIAGNOSIS — N81.11 CYSTOCELE, MIDLINE: ICD-10-CM

## 2023-10-23 PROCEDURE — 51701 INSERT BLADDER CATHETER: CPT

## 2023-10-23 PROCEDURE — 99214 OFFICE O/P EST MOD 30 MIN: CPT | Mod: 25

## 2023-10-25 ENCOUNTER — OUTPATIENT (OUTPATIENT)
Dept: OUTPATIENT SERVICES | Facility: HOSPITAL | Age: 63
LOS: 1 days | End: 2023-10-25
Payer: MEDICARE

## 2023-10-25 VITALS
HEART RATE: 90 BPM | HEIGHT: 67 IN | SYSTOLIC BLOOD PRESSURE: 91 MMHG | WEIGHT: 162.92 LBS | OXYGEN SATURATION: 96 % | TEMPERATURE: 98 F | DIASTOLIC BLOOD PRESSURE: 63 MMHG | RESPIRATION RATE: 18 BRPM

## 2023-10-25 DIAGNOSIS — Z98.1 ARTHRODESIS STATUS: Chronic | ICD-10-CM

## 2023-10-25 DIAGNOSIS — Z98.890 OTHER SPECIFIED POSTPROCEDURAL STATES: Chronic | ICD-10-CM

## 2023-10-25 DIAGNOSIS — N81.2 INCOMPLETE UTEROVAGINAL PROLAPSE: ICD-10-CM

## 2023-10-25 DIAGNOSIS — N39.3 STRESS INCONTINENCE (FEMALE) (MALE): ICD-10-CM

## 2023-10-25 DIAGNOSIS — Z29.9 ENCOUNTER FOR PROPHYLACTIC MEASURES, UNSPECIFIED: ICD-10-CM

## 2023-10-25 DIAGNOSIS — N81.11 CYSTOCELE, MIDLINE: ICD-10-CM

## 2023-10-25 DIAGNOSIS — M54.2 CERVICALGIA: Chronic | ICD-10-CM

## 2023-10-25 DIAGNOSIS — E23.6 OTHER DISORDERS OF PITUITARY GLAND: Chronic | ICD-10-CM

## 2023-10-25 DIAGNOSIS — Z90.89 ACQUIRED ABSENCE OF OTHER ORGANS: Chronic | ICD-10-CM

## 2023-10-25 DIAGNOSIS — N81.6 RECTOCELE: ICD-10-CM

## 2023-10-25 DIAGNOSIS — Z01.818 ENCOUNTER FOR OTHER PREPROCEDURAL EXAMINATION: ICD-10-CM

## 2023-10-25 LAB
A1C WITH ESTIMATED AVERAGE GLUCOSE RESULT: 5.2 % — SIGNIFICANT CHANGE UP (ref 4–5.6)
A1C WITH ESTIMATED AVERAGE GLUCOSE RESULT: 5.2 % — SIGNIFICANT CHANGE UP (ref 4–5.6)
ANION GAP SERPL CALC-SCNC: 13 MMOL/L — SIGNIFICANT CHANGE UP (ref 5–17)
ANION GAP SERPL CALC-SCNC: 13 MMOL/L — SIGNIFICANT CHANGE UP (ref 5–17)
BLD GP AB SCN SERPL QL: NEGATIVE — SIGNIFICANT CHANGE UP
BLD GP AB SCN SERPL QL: NEGATIVE — SIGNIFICANT CHANGE UP
BUN SERPL-MCNC: 26 MG/DL — HIGH (ref 7–23)
BUN SERPL-MCNC: 26 MG/DL — HIGH (ref 7–23)
CALCIUM SERPL-MCNC: 9.8 MG/DL — SIGNIFICANT CHANGE UP (ref 8.4–10.5)
CALCIUM SERPL-MCNC: 9.8 MG/DL — SIGNIFICANT CHANGE UP (ref 8.4–10.5)
CHLORIDE SERPL-SCNC: 104 MMOL/L — SIGNIFICANT CHANGE UP (ref 96–108)
CHLORIDE SERPL-SCNC: 104 MMOL/L — SIGNIFICANT CHANGE UP (ref 96–108)
CO2 SERPL-SCNC: 22 MMOL/L — SIGNIFICANT CHANGE UP (ref 22–31)
CO2 SERPL-SCNC: 22 MMOL/L — SIGNIFICANT CHANGE UP (ref 22–31)
CREAT SERPL-MCNC: 0.72 MG/DL — SIGNIFICANT CHANGE UP (ref 0.5–1.3)
CREAT SERPL-MCNC: 0.72 MG/DL — SIGNIFICANT CHANGE UP (ref 0.5–1.3)
EGFR: 93 ML/MIN/1.73M2 — SIGNIFICANT CHANGE UP
EGFR: 93 ML/MIN/1.73M2 — SIGNIFICANT CHANGE UP
ESTIMATED AVERAGE GLUCOSE: 103 MG/DL — SIGNIFICANT CHANGE UP (ref 68–114)
ESTIMATED AVERAGE GLUCOSE: 103 MG/DL — SIGNIFICANT CHANGE UP (ref 68–114)
GLUCOSE SERPL-MCNC: 101 MG/DL — HIGH (ref 70–99)
GLUCOSE SERPL-MCNC: 101 MG/DL — HIGH (ref 70–99)
HCT VFR BLD CALC: 34 % — LOW (ref 34.5–45)
HCT VFR BLD CALC: 34 % — LOW (ref 34.5–45)
HGB BLD-MCNC: 11.5 G/DL — SIGNIFICANT CHANGE UP (ref 11.5–15.5)
HGB BLD-MCNC: 11.5 G/DL — SIGNIFICANT CHANGE UP (ref 11.5–15.5)
MCHC RBC-ENTMCNC: 31.8 PG — SIGNIFICANT CHANGE UP (ref 27–34)
MCHC RBC-ENTMCNC: 31.8 PG — SIGNIFICANT CHANGE UP (ref 27–34)
MCHC RBC-ENTMCNC: 33.8 GM/DL — SIGNIFICANT CHANGE UP (ref 32–36)
MCHC RBC-ENTMCNC: 33.8 GM/DL — SIGNIFICANT CHANGE UP (ref 32–36)
MCV RBC AUTO: 93.9 FL — SIGNIFICANT CHANGE UP (ref 80–100)
MCV RBC AUTO: 93.9 FL — SIGNIFICANT CHANGE UP (ref 80–100)
NRBC # BLD: 0 /100 WBCS — SIGNIFICANT CHANGE UP (ref 0–0)
NRBC # BLD: 0 /100 WBCS — SIGNIFICANT CHANGE UP (ref 0–0)
PLATELET # BLD AUTO: 239 K/UL — SIGNIFICANT CHANGE UP (ref 150–400)
PLATELET # BLD AUTO: 239 K/UL — SIGNIFICANT CHANGE UP (ref 150–400)
POTASSIUM SERPL-MCNC: 3.8 MMOL/L — SIGNIFICANT CHANGE UP (ref 3.5–5.3)
POTASSIUM SERPL-MCNC: 3.8 MMOL/L — SIGNIFICANT CHANGE UP (ref 3.5–5.3)
POTASSIUM SERPL-SCNC: 3.8 MMOL/L — SIGNIFICANT CHANGE UP (ref 3.5–5.3)
POTASSIUM SERPL-SCNC: 3.8 MMOL/L — SIGNIFICANT CHANGE UP (ref 3.5–5.3)
RBC # BLD: 3.62 M/UL — LOW (ref 3.8–5.2)
RBC # BLD: 3.62 M/UL — LOW (ref 3.8–5.2)
RBC # FLD: 12.4 % — SIGNIFICANT CHANGE UP (ref 10.3–14.5)
RBC # FLD: 12.4 % — SIGNIFICANT CHANGE UP (ref 10.3–14.5)
RH IG SCN BLD-IMP: POSITIVE — SIGNIFICANT CHANGE UP
RH IG SCN BLD-IMP: POSITIVE — SIGNIFICANT CHANGE UP
SODIUM SERPL-SCNC: 139 MMOL/L — SIGNIFICANT CHANGE UP (ref 135–145)
SODIUM SERPL-SCNC: 139 MMOL/L — SIGNIFICANT CHANGE UP (ref 135–145)
WBC # BLD: 3.21 K/UL — LOW (ref 3.8–10.5)
WBC # BLD: 3.21 K/UL — LOW (ref 3.8–10.5)
WBC # FLD AUTO: 3.21 K/UL — LOW (ref 3.8–10.5)
WBC # FLD AUTO: 3.21 K/UL — LOW (ref 3.8–10.5)

## 2023-10-25 PROCEDURE — 83036 HEMOGLOBIN GLYCOSYLATED A1C: CPT

## 2023-10-25 PROCEDURE — 85027 COMPLETE CBC AUTOMATED: CPT

## 2023-10-25 PROCEDURE — 36415 COLL VENOUS BLD VENIPUNCTURE: CPT

## 2023-10-25 PROCEDURE — G0463: CPT

## 2023-10-25 PROCEDURE — 86900 BLOOD TYPING SEROLOGIC ABO: CPT

## 2023-10-25 PROCEDURE — 86850 RBC ANTIBODY SCREEN: CPT

## 2023-10-25 PROCEDURE — 86901 BLOOD TYPING SEROLOGIC RH(D): CPT

## 2023-10-25 PROCEDURE — 80048 BASIC METABOLIC PNL TOTAL CA: CPT

## 2023-10-25 RX ORDER — CEFOTETAN DISODIUM 1 G
2 VIAL (EA) INJECTION ONCE
Refills: 0 | Status: COMPLETED | OUTPATIENT
Start: 2023-11-14 | End: 2023-11-14

## 2023-10-25 RX ORDER — GABAPENTIN 400 MG/1
600 CAPSULE ORAL ONCE
Refills: 0 | Status: DISCONTINUED | OUTPATIENT
Start: 2023-11-14 | End: 2023-11-14

## 2023-10-25 RX ORDER — SODIUM CHLORIDE 9 MG/ML
3 INJECTION INTRAMUSCULAR; INTRAVENOUS; SUBCUTANEOUS EVERY 8 HOURS
Refills: 0 | Status: DISCONTINUED | OUTPATIENT
Start: 2023-11-14 | End: 2023-11-14

## 2023-10-25 RX ORDER — CELECOXIB 200 MG/1
400 CAPSULE ORAL ONCE
Refills: 0 | Status: COMPLETED | OUTPATIENT
Start: 2023-11-14 | End: 2023-11-14

## 2023-10-25 RX ORDER — ACETAMINOPHEN 500 MG
1000 TABLET ORAL ONCE
Refills: 0 | Status: COMPLETED | OUTPATIENT
Start: 2023-11-14 | End: 2023-11-14

## 2023-10-25 RX ORDER — CHLORHEXIDINE GLUCONATE 213 G/1000ML
1 SOLUTION TOPICAL ONCE
Refills: 0 | Status: COMPLETED | OUTPATIENT
Start: 2023-11-14 | End: 2023-11-14

## 2023-10-25 RX ORDER — LIDOCAINE HCL 20 MG/ML
0.2 VIAL (ML) INJECTION ONCE
Refills: 0 | Status: DISCONTINUED | OUTPATIENT
Start: 2023-11-14 | End: 2023-11-14

## 2023-10-25 NOTE — H&P PST ADULT - HISTORY OF PRESENT ILLNESS
This is a 63 year old female with past medical history of HTN, HLD, prior cervical fusions, cervical radiculopathy S/P Anterior Cervical Discectomy and Fusion at C5/6, C6/7 performed in Jan 23' maintained Gabapentin, Lyrica, Norco for pain  relief. Reports having stress incontinence   Today presenting to [facility] for scheduled ERP, laparoscopic robotic  supracervical hysterectomy, laparoscopic robotic sacral colpopexy, midurethral sling cystoscopy, possible posterior repair on 11/14/23 with Dr. Buchanan This is a 63 year old female with past medical history of HTN, HLD, prior cervical fusions, cervical radiculopathy S/P Anterior Cervical Discectomy and Fusion at C5/6, C6/7 performed in Jan 23' maintained on gabapentin, Lyrica, oxycodone for pain relief. Follows up with pain management routine. Reports having stress incontinence, and bladder prolapse. Today presenting to PST for scheduled ERP, laparoscopic robotic  supracervical hysterectomy, laparoscopic robotic sacral colpopexy, midurethral sling cystoscopy, possible posterior repair on 11/14/23 with Dr. Buchanan

## 2023-10-25 NOTE — H&P PST ADULT - NSICDXPASTMEDICALHX_GEN_ALL_CORE_FT
PAST MEDICAL HISTORY:  Cervical neck pain with evidence of disc disease     Cervical radiculopathy     Chronic neck pain     Disc disorder of cervical region     HLD (hyperlipidemia)     HTN (hypertension)     OA (osteoarthritis)     Presence of pessary     Uterine prolapse

## 2023-10-25 NOTE — H&P PST ADULT - PROBLEM SELECTOR PLAN 1
Preop instructions and chlorhexidine soap  GYN protocol given  Labs CBC BMP A1c T&S   UC performed with surgeon 10/23 Preop instructions and chlorhexidine soap  GYN protocol given  Labs CBC BMP A1c T&S   UC performed with surgeon on 10/23

## 2023-10-25 NOTE — H&P PST ADULT - ASSESSMENT
DASI Score: 6.45   DASI Activity: able to go up one flight of stairs or walk 1-2 blocks with out difficulty  Loose or removable teeth: denies    CAPRINI SCORE    AGE RELATED RISK FACTORS                                                       MOBILITY RELATED FACTORS  [ ] Age 41-60 years                                            (1 Point)                  [ ] Bed rest                                                        (1 Point)  [x ] Age: 61-74 years                                           (2 Points)                [ ] Plaster cast                                                   (2 Points)  [ ] Age= 75 years                                              (3 Points)                 [ ] Bed bound for more than 72 hours                   (2 Points)    DISEASE RELATED RISK FACTORS                                               GENDER SPECIFIC FACTORS  [ ] Edema in the lower extremities                       (1 Point)                  [ ] Pregnancy                                                     (1 Point)  [ ] Varicose veins                                               (1 Point)                  [ ] Post-partum < 6 weeks                                   (1 Point)             [ ] BMI > 25 Kg/m2                                            (1 Point)                  [ ] Hormonal therapy  or oral contraception            (1 Point)                 [ ] Sepsis (in the previous month)                        (1 Point)                  [ ] History of pregnancy complications  [ ] Pneumonia or serious lung disease                                               [ ] Unexplained or recurrent                       (1 Point)           (in the previous month)                               (1 Point)  [ ] Abnormal pulmonary function test                     (1 Point)                 SURGERY RELATED RISK FACTORS  [ ] Acute myocardial infarction                              (1 Point)                 [ ]  Section                                            (1 Point)  [ ] Congestive heart failure (in the previous month)  (1 Point)                 [ ] Minor surgery                                                 (1 Point)   [ ] Inflammatory bowel disease                             (1 Point)                 [ ] Arthroscopic surgery                                        (2 Points)  [ ] Central venous access                                    (2 Points)                [x ] General surgery lasting more than 45 minutes   (2 Points)       [ ] Stroke (in the previous month)                          (5 Points)               [ ] Elective arthroplasty                                        (5 Points)                                                                                                                                               HEMATOLOGY RELATED FACTORS                                                 TRAUMA RELATED RISK FACTORS  [ ] Prior episodes of VTE                                     (3 Points)                 [ ] Fracture of the hip, pelvis, or leg                       (5 Points)  [ ] Positive family history for VTE                         (3 Points)                 [ ] Acute spinal cord injury (in the previous month)  (5 Points)  [ ] Prothrombin 04612 A                                      (3 Points)                 [ ] Paralysis  (less than 1 month)                          (5 Points)  [ ] Factor V Leiden                                             (3 Points)                 [ ] Multiple Trauma within 1 month                         (5 Points)  [ ] Lupus anticoagulants                                     (3 Points)                                                           [ ] Anticardiolipin antibodies                                (3 Points)                                                       [ ] High homocysteine in the blood                      (3 Points)                                             [ ] Other congenital or acquired thrombophilia       (3 Points)                                                [ ] Heparin induced thrombocytopenia                  (3 Points)                                          Total Score [  4]

## 2023-10-27 DIAGNOSIS — N39.0 URINARY TRACT INFECTION, SITE NOT SPECIFIED: ICD-10-CM

## 2023-10-27 RX ORDER — SULFAMETHOXAZOLE AND TRIMETHOPRIM 800; 160 MG/1; MG/1
800-160 TABLET ORAL TWICE DAILY
Qty: 6 | Refills: 0 | Status: ACTIVE | COMMUNITY
Start: 2023-10-27 | End: 1900-01-01

## 2023-11-01 ENCOUNTER — APPOINTMENT (OUTPATIENT)
Dept: PAIN MANAGEMENT | Facility: CLINIC | Age: 63
End: 2023-11-01
Payer: MEDICARE

## 2023-11-01 PROCEDURE — 99213 OFFICE O/P EST LOW 20 MIN: CPT | Mod: 95

## 2023-11-13 ENCOUNTER — TRANSCRIPTION ENCOUNTER (OUTPATIENT)
Age: 63
End: 2023-11-13

## 2023-11-14 ENCOUNTER — TRANSCRIPTION ENCOUNTER (OUTPATIENT)
Age: 63
End: 2023-11-14

## 2023-11-14 ENCOUNTER — OUTPATIENT (OUTPATIENT)
Dept: INPATIENT UNIT | Facility: HOSPITAL | Age: 63
LOS: 1 days | End: 2023-11-14
Payer: MEDICARE

## 2023-11-14 ENCOUNTER — APPOINTMENT (OUTPATIENT)
Dept: UROGYNECOLOGY | Facility: HOSPITAL | Age: 63
End: 2023-11-14
Payer: MEDICARE

## 2023-11-14 VITALS
HEART RATE: 71 BPM | HEIGHT: 67.01 IN | RESPIRATION RATE: 18 BRPM | WEIGHT: 162.92 LBS | OXYGEN SATURATION: 98 % | DIASTOLIC BLOOD PRESSURE: 92 MMHG | SYSTOLIC BLOOD PRESSURE: 154 MMHG | TEMPERATURE: 98 F

## 2023-11-14 DIAGNOSIS — Z98.890 OTHER SPECIFIED POSTPROCEDURAL STATES: Chronic | ICD-10-CM

## 2023-11-14 DIAGNOSIS — E23.6 OTHER DISORDERS OF PITUITARY GLAND: Chronic | ICD-10-CM

## 2023-11-14 DIAGNOSIS — N81.2 INCOMPLETE UTEROVAGINAL PROLAPSE: ICD-10-CM

## 2023-11-14 DIAGNOSIS — N81.11 CYSTOCELE, MIDLINE: ICD-10-CM

## 2023-11-14 DIAGNOSIS — N81.4 UTEROVAGINAL PROLAPSE, UNSPECIFIED: ICD-10-CM

## 2023-11-14 DIAGNOSIS — M54.2 CERVICALGIA: Chronic | ICD-10-CM

## 2023-11-14 DIAGNOSIS — Z98.1 ARTHRODESIS STATUS: Chronic | ICD-10-CM

## 2023-11-14 DIAGNOSIS — Z90.89 ACQUIRED ABSENCE OF OTHER ORGANS: Chronic | ICD-10-CM

## 2023-11-14 LAB
GLUCOSE BLDC GLUCOMTR-MCNC: 117 MG/DL — HIGH (ref 70–99)
GLUCOSE BLDC GLUCOMTR-MCNC: 117 MG/DL — HIGH (ref 70–99)

## 2023-11-14 PROCEDURE — 57425 LAPAROSCOPY SURG COLPOPEXY: CPT

## 2023-11-14 PROCEDURE — 57288 REPAIR BLADDER DEFECT: CPT

## 2023-11-14 PROCEDURE — 88307 TISSUE EXAM BY PATHOLOGIST: CPT | Mod: 26

## 2023-11-14 PROCEDURE — 88302 TISSUE EXAM BY PATHOLOGIST: CPT | Mod: 26

## 2023-11-14 PROCEDURE — 58542 LSH W/T/O UT 250 G OR LESS: CPT

## 2023-11-14 DEVICE — SURGICEL POWDER 3 GRAMS: Type: IMPLANTABLE DEVICE | Status: FUNCTIONAL

## 2023-11-14 DEVICE — MESH UPSYLON Y: Type: IMPLANTABLE DEVICE | Status: FUNCTIONAL

## 2023-11-14 DEVICE — SLING TRANSVAGINAL MID-URETHRAL ADVANTAGE FIT BLUE: Type: IMPLANTABLE DEVICE | Status: FUNCTIONAL

## 2023-11-14 RX ORDER — HYDROMORPHONE HYDROCHLORIDE 2 MG/ML
1 INJECTION INTRAMUSCULAR; INTRAVENOUS; SUBCUTANEOUS
Refills: 0 | Status: DISCONTINUED | OUTPATIENT
Start: 2023-11-14 | End: 2023-11-14

## 2023-11-14 RX ORDER — ONDANSETRON 8 MG/1
4 TABLET, FILM COATED ORAL ONCE
Refills: 0 | Status: DISCONTINUED | OUTPATIENT
Start: 2023-11-14 | End: 2023-11-14

## 2023-11-14 RX ORDER — IBUPROFEN 200 MG
600 TABLET ORAL EVERY 6 HOURS
Refills: 0 | Status: DISCONTINUED | OUTPATIENT
Start: 2023-11-14 | End: 2023-11-15

## 2023-11-14 RX ORDER — GABAPENTIN 400 MG/1
100 CAPSULE ORAL THREE TIMES A DAY
Refills: 0 | Status: DISCONTINUED | OUTPATIENT
Start: 2023-11-14 | End: 2023-11-14

## 2023-11-14 RX ORDER — GABAPENTIN 400 MG/1
300 CAPSULE ORAL THREE TIMES A DAY
Refills: 0 | Status: DISCONTINUED | OUTPATIENT
Start: 2023-11-14 | End: 2023-11-15

## 2023-11-14 RX ORDER — ACETAMINOPHEN 500 MG
975 TABLET ORAL EVERY 6 HOURS
Refills: 0 | Status: DISCONTINUED | OUTPATIENT
Start: 2023-11-14 | End: 2023-11-15

## 2023-11-14 RX ORDER — ONDANSETRON 8 MG/1
4 TABLET, FILM COATED ORAL EVERY 8 HOURS
Refills: 0 | Status: DISCONTINUED | OUTPATIENT
Start: 2023-11-14 | End: 2023-11-15

## 2023-11-14 RX ORDER — HYDROMORPHONE HYDROCHLORIDE 2 MG/ML
0.5 INJECTION INTRAMUSCULAR; INTRAVENOUS; SUBCUTANEOUS
Refills: 0 | Status: DISCONTINUED | OUTPATIENT
Start: 2023-11-14 | End: 2023-11-14

## 2023-11-14 RX ORDER — ATORVASTATIN CALCIUM 80 MG/1
80 TABLET, FILM COATED ORAL EVERY 24 HOURS
Refills: 0 | Status: DISCONTINUED | OUTPATIENT
Start: 2023-11-15 | End: 2023-11-15

## 2023-11-14 RX ORDER — GABAPENTIN 400 MG/1
1100 CAPSULE ORAL THREE TIMES A DAY
Refills: 0 | Status: DISCONTINUED | OUTPATIENT
Start: 2023-11-14 | End: 2023-11-14

## 2023-11-14 RX ORDER — KETOROLAC TROMETHAMINE 30 MG/ML
30 SYRINGE (ML) INJECTION ONCE
Refills: 0 | Status: DISCONTINUED | OUTPATIENT
Start: 2023-11-14 | End: 2023-11-14

## 2023-11-14 RX ORDER — FAMOTIDINE 10 MG/ML
20 INJECTION INTRAVENOUS DAILY
Refills: 0 | Status: DISCONTINUED | OUTPATIENT
Start: 2023-11-14 | End: 2023-11-15

## 2023-11-14 RX ORDER — GABAPENTIN 400 MG/1
1 CAPSULE ORAL
Qty: 0 | Refills: 0 | DISCHARGE

## 2023-11-14 RX ORDER — ROSUVASTATIN CALCIUM 5 MG/1
1 TABLET ORAL
Qty: 0 | Refills: 0 | DISCHARGE

## 2023-11-14 RX ORDER — OXYCODONE HYDROCHLORIDE 5 MG/1
7.5 TABLET ORAL EVERY 8 HOURS
Refills: 0 | Status: DISCONTINUED | OUTPATIENT
Start: 2023-11-14 | End: 2023-11-15

## 2023-11-14 RX ORDER — SODIUM CHLORIDE 9 MG/ML
1000 INJECTION, SOLUTION INTRAVENOUS
Refills: 0 | Status: DISCONTINUED | OUTPATIENT
Start: 2023-11-14 | End: 2023-11-15

## 2023-11-14 RX ORDER — SIMETHICONE 80 MG/1
80 TABLET, CHEWABLE ORAL EVERY 6 HOURS
Refills: 0 | Status: DISCONTINUED | OUTPATIENT
Start: 2023-11-14 | End: 2023-11-15

## 2023-11-14 RX ORDER — BENAZEPRIL HYDROCHLORIDE AND HYDROCHLOROTHIAZIDE 10; 12.5 MG/1; MG/1
1 TABLET, FILM COATED ORAL
Qty: 0 | Refills: 0 | DISCHARGE

## 2023-11-14 RX ORDER — MORPHINE SULFATE 50 MG/1
15 CAPSULE, EXTENDED RELEASE ORAL AT BEDTIME
Refills: 0 | Status: DISCONTINUED | OUTPATIENT
Start: 2023-11-14 | End: 2023-11-15

## 2023-11-14 RX ORDER — INFLUENZA VIRUS VACCINE 15; 15; 15; 15 UG/.5ML; UG/.5ML; UG/.5ML; UG/.5ML
0.7 SUSPENSION INTRAMUSCULAR ONCE
Refills: 0 | Status: DISCONTINUED | OUTPATIENT
Start: 2023-11-14 | End: 2023-11-15

## 2023-11-14 RX ORDER — SENNA PLUS 8.6 MG/1
2 TABLET ORAL AT BEDTIME
Refills: 0 | Status: DISCONTINUED | OUTPATIENT
Start: 2023-11-14 | End: 2023-11-15

## 2023-11-14 RX ORDER — METHOCARBAMOL 500 MG/1
500 TABLET, FILM COATED ORAL EVERY 8 HOURS
Refills: 0 | Status: DISCONTINUED | OUTPATIENT
Start: 2023-11-14 | End: 2023-11-15

## 2023-11-14 RX ADMIN — CELECOXIB 400 MILLIGRAM(S): 200 CAPSULE ORAL at 06:58

## 2023-11-14 RX ADMIN — Medication 600 MILLIGRAM(S): at 21:06

## 2023-11-14 RX ADMIN — Medication 1000 MILLIGRAM(S): at 06:57

## 2023-11-14 RX ADMIN — MORPHINE SULFATE 15 MILLIGRAM(S): 50 CAPSULE, EXTENDED RELEASE ORAL at 21:36

## 2023-11-14 RX ADMIN — Medication 975 MILLIGRAM(S): at 18:20

## 2023-11-14 RX ADMIN — Medication 975 MILLIGRAM(S): at 17:54

## 2023-11-14 RX ADMIN — Medication 1000 MILLIGRAM(S): at 07:28

## 2023-11-14 RX ADMIN — MORPHINE SULFATE 15 MILLIGRAM(S): 50 CAPSULE, EXTENDED RELEASE ORAL at 21:06

## 2023-11-14 RX ADMIN — Medication 30 MILLIGRAM(S): at 15:20

## 2023-11-14 RX ADMIN — Medication 50 MILLIGRAM(S): at 17:55

## 2023-11-14 RX ADMIN — HYDROMORPHONE HYDROCHLORIDE 1 MILLIGRAM(S): 2 INJECTION INTRAMUSCULAR; INTRAVENOUS; SUBCUTANEOUS at 14:07

## 2023-11-14 RX ADMIN — Medication 600 MILLIGRAM(S): at 21:36

## 2023-11-14 RX ADMIN — Medication 30 MILLIGRAM(S): at 15:05

## 2023-11-14 RX ADMIN — GABAPENTIN 300 MILLIGRAM(S): 400 CAPSULE ORAL at 21:06

## 2023-11-14 RX ADMIN — HYDROMORPHONE HYDROCHLORIDE 1 MILLIGRAM(S): 2 INJECTION INTRAMUSCULAR; INTRAVENOUS; SUBCUTANEOUS at 13:24

## 2023-11-14 RX ADMIN — OXYCODONE HYDROCHLORIDE 7.5 MILLIGRAM(S): 5 TABLET ORAL at 18:20

## 2023-11-14 RX ADMIN — SIMETHICONE 80 MILLIGRAM(S): 80 TABLET, CHEWABLE ORAL at 21:09

## 2023-11-14 RX ADMIN — CHLORHEXIDINE GLUCONATE 1 APPLICATION(S): 213 SOLUTION TOPICAL at 06:57

## 2023-11-14 RX ADMIN — SENNA PLUS 2 TABLET(S): 8.6 TABLET ORAL at 21:06

## 2023-11-14 RX ADMIN — OXYCODONE HYDROCHLORIDE 7.5 MILLIGRAM(S): 5 TABLET ORAL at 17:54

## 2023-11-14 RX ADMIN — HYDROMORPHONE HYDROCHLORIDE 1 MILLIGRAM(S): 2 INJECTION INTRAMUSCULAR; INTRAVENOUS; SUBCUTANEOUS at 13:39

## 2023-11-14 RX ADMIN — METHOCARBAMOL 500 MILLIGRAM(S): 500 TABLET, FILM COATED ORAL at 17:55

## 2023-11-14 RX ADMIN — HYDROMORPHONE HYDROCHLORIDE 1 MILLIGRAM(S): 2 INJECTION INTRAMUSCULAR; INTRAVENOUS; SUBCUTANEOUS at 14:22

## 2023-11-14 RX ADMIN — CELECOXIB 400 MILLIGRAM(S): 200 CAPSULE ORAL at 07:28

## 2023-11-14 NOTE — PATIENT PROFILE ADULT - NSFALLSECTIONLABEL_GEN_A_CORE
. Mastoid Interpolation Flap Text: A decision was made to reconstruct the defect utilizing an interpolation axial flap and a staged reconstruction.  A telfa template was made of the defect.  This telfa template was then used to outline the mastoid interpolation flap.  The donor area for the pedicle flap was then injected with anesthesia.  The flap was excised through the skin and subcutaneous tissue down to the layer of the underlying musculature.  The pedicle flap was carefully excised within this deep plane to maintain its blood supply.  The edges of the donor site were undermined.   The donor site was closed in a primary fashion.  The pedicle was then rotated into position and sutured.  Once the tube was sutured into place, adequate blood supply was confirmed with blanching and refill.  The pedicle was then wrapped with xeroform gauze and dressed appropriately with a telfa and gauze bandage to ensure continued blood supply and protect the attached pedicle.

## 2023-11-14 NOTE — PROGRESS NOTE ADULT - ASSESSMENT
A/P: 63y Female POD 0 S/P BHANU/BS, KG, SCC, MUS, cystoscopy - doing well  with PMHx of  PAST MEDICAL & SURGICAL HISTORY:  Chronic neck pain  HTN (hypertension)  HLD (hyperlipidemia)  Disc disorder of cervical region  Cervical neck pain with evidence of disc disease  Cervical radiculopathy  Uterine prolapse  Presence of pessary  OA (osteoarthritis)  Neck pain with history of cervical spinal surgery x2  Pituitary mass 30 y ears ago  H/O foot surgery b/l  H/O shoulder surgery right  S/P cervical spinal fusion  History of elbow surgery  S/P rotator cuff repair  S/P tonsillectomy  S/P breast biopsy  S/P colonoscopy  H/O cervical discectomy

## 2023-11-14 NOTE — DISCHARGE NOTE PROVIDER - NSDCCPTREATMENT_GEN_ALL_CORE_FT
PRINCIPAL PROCEDURE  Procedure: Robot-assisted laparoscopic supracervical hysterectomy with cystoscopy  Findings and Treatment:       SECONDARY PROCEDURE  Procedure: Exam under anesthesia, pelvic  Findings and Treatment:     Procedure: Bilateral salpingectomy  Findings and Treatment:

## 2023-11-14 NOTE — BRIEF OPERATIVE NOTE - NSICDXBRIEFPROCEDURE_GEN_ALL_CORE_FT
PROCEDURES:  Exam under anesthesia, pelvic 14-Nov-2023 12:24:02  Allie Bonilla  Robot-assisted laparoscopic supracervical hysterectomy with cystoscopy 14-Nov-2023 12:24:13  Allie Bonilla  Bilateral salpingectomy 14-Nov-2023 12:24:24  Allie Bonilla  Lysis of pelvic adhesions 14-Nov-2023 12:24:32  Allie Bonilla  Robot-assisted sacrocolpopexy 14-Nov-2023 12:24:56  Allie Bonilla  Creation, midurethral sling, female 14-Nov-2023 12:25:07  Allie Bonilla

## 2023-11-14 NOTE — DISCHARGE NOTE PROVIDER - HOSPITAL COURSE
Patient presented for scheduled procedure. She underwent uncomplicated robotic supracervical hysterectomy, sacral colpopexy, midurethral sling, lysis of adhesions and cystoscopy. Please see operative note for full details. Patient was transferred to recovery room in stable condition.  Diet was advanced and patient tolerating regular diet with PO without difficulty. Labs drawn on POD#1 were stable compared to pre-op. On POD#1, Ham catheter was discontinued and patient passed TOV without issues. On POD#1 patient was deemed stable for discharge as she was meeting postoperative milestones - tolerating regular diet, ambulating without difficulty, voiding, and pain was well controlled on oral medications. Throughout admission, patient received prophylactic anticoagulation.    Patient was instructed to follow up with Dr. Buchanan on 11/27 at 12:30PM. Patient presented for scheduled procedure. She underwent uncomplicated robotic supracervical hysterectomy, bilateral salpingectomy, sacral colpopexy, midurethral sling, lysis of adhesions and cystoscopy. Please see operative note for full details. Patient was transferred to recovery room in stable condition.  Diet was advanced and patient tolerating regular diet with PO without difficulty. Labs drawn on POD#1 were stable compared to pre-op. On POD#1, Ham catheter was discontinued and patient passed TOV without issues. On POD#1 patient was deemed stable for discharge as she was meeting postoperative milestones - tolerating regular diet, ambulating without difficulty, voiding, and pain was well controlled on oral medications.    Patient was instructed to follow up with Dr. Buchanan on 11/27 at 12:30PM.

## 2023-11-14 NOTE — DISCHARGE NOTE PROVIDER - NSDCMRMEDTOKEN_GEN_ALL_CORE_FT
ashwaganda: orally once a day  benazepril-hydrochlorothiazide 20 mg-12.5 mg oral tablet: 1 tab(s) orally once a day  black elderberry: orally once a day  gabapentin 300 mg oral capsule: 1 cap(s) orally 3 times a day  gabapentin 800 mg oral tablet: 1 tab(s) orally 3 times a day  Lyrica 50 mg oral capsule: 1 cap(s) orally 2 times a day  methocarbamol 750 mg oral tablet: 1 tab(s) orally every 8 hours   Multiple Vitamins oral tablet: 1 tab(s) orally once a day  oxyCODONE 10 mg oral tablet: 1 tab(s) orally every 3 hours, As needed, Severe Pain (7 - 10) MDD:6  rosuvastatin 20 mg oral capsule: 1 cap(s) orally once a day   acetaminophen 325 mg oral tablet: 3 tab(s) orally every 6 hours  ashwaganda: orally once a day  benazepril-hydrochlorothiazide 20 mg-12.5 mg oral tablet: 1 tab(s) orally once a day  black elderberry: orally once a day  gabapentin 300 mg oral capsule: 1 cap(s) orally 3 times a day  gabapentin 800 mg oral tablet: 1 tab(s) orally 3 times a day  ibuprofen 600 mg oral tablet: 1 tab(s) orally every 6 hours  Lyrica 50 mg oral capsule: 1 cap(s) orally 2 times a day  methocarbamol 750 mg oral tablet: 1 tab(s) orally every 8 hours   Multiple Vitamins oral tablet: 1 tab(s) orally once a day  oxyCODONE 5 mg oral tablet: 1 tab(s) orally every 6 hours MDD: 4  rosuvastatin 20 mg oral capsule: 1 cap(s) orally once a day

## 2023-11-14 NOTE — PRE-ANESTHESIA EVALUATION ADULT - NSANTHAIRWAYFT_ENT_ALL_CORE
Good mouth opening, FROM at neck, TM distance > 6 cm Good mouth opening, large tongue, small mouth, TM distance > 6 cm, limited neck extension/flexion/rotation.

## 2023-11-14 NOTE — BRIEF OPERATIVE NOTE - OPERATION/FINDINGS
Normal appearing external female genitalia with stage III pelvic organ prolapse. Laparoscopically, uterus and bilateral ovaries were normal in appearance. Evidence of bilateral tubal ligation noted, otherwise normal appearing bilateral fallopian tubes. Small omental adhesion to right anterior pelvic/ lower abdominal wall.     On cystoscopy, the bladder mucosa was normal in appearance, and the bladder was noted to be intact with no defect, foreign body, suture, or mesh. Bilateral ureteral orifices were visualized with vigorous efflux of clear yellow urine noted bilaterally. Vaginal exam at conclusion of the case without mesh or suture exposure.

## 2023-11-14 NOTE — DISCHARGE NOTE PROVIDER - NSDCCPCAREPLAN_GEN_ALL_CORE_FT
PRINCIPAL DISCHARGE DIAGNOSIS  Diagnosis: Uterovaginal prolapse, unspecified  Assessment and Plan of Treatment: Community Status: Active      SECONDARY DISCHARGE DIAGNOSES  Diagnosis: Stress incontinence  Assessment and Plan of Treatment:

## 2023-11-14 NOTE — BRIEF OPERATIVE NOTE - NSICDXBRIEFPREOP_GEN_ALL_CORE_FT
PRE-OP DIAGNOSIS:  Incomplete uterovaginal prolapse 14-Nov-2023 12:25:44  Allie Bonilla  Midline cystocele 14-Nov-2023 12:25:58  Allie Bonilla  Rectocele 14-Nov-2023 12:26:03  Allie Bonilla  Female stress incontinence 14-Nov-2023 12:26:10  Allie Bonilla

## 2023-11-14 NOTE — BRIEF OPERATIVE NOTE - NSICDXBRIEFPOSTOP_GEN_ALL_CORE_FT
POST-OP DIAGNOSIS:  Incomplete uterovaginal prolapse 14-Nov-2023 12:26:27  lAlie Bonilla  Midline cystocele 14-Nov-2023 12:26:31  Allie Bonilla  Rectocele 14-Nov-2023 12:26:35  Allie Bonilla  Female stress incontinence 14-Nov-2023 12:26:48  Allie Bonilla

## 2023-11-14 NOTE — DISCHARGE NOTE PROVIDER - NSDCFUADDINST_GEN_ALL_CORE_FT
Bowel regimen  - To avoid constipation and straining, we suggest the following (simultaneously):   1. Colace (stool softener) 100mg, one pill three times a day (breakfast, lunch, dinner). If stool is too soft, decrease to twice a day (breakfast, dinner).    2. If still constipated, you can add: Miralax once at bedtime.  All of these agents can be obtained over the counter at the pharmacy.     Pain control -  Take your home pain medication regimen. In addition can also take   1. Motrin 800mg three times a day, or 600mg four times a day, take with food   2. Add Tylenol as needed if still have pain despite Motrin    Motrin and Tylenol can be obtained over the counter.    Postoperative restrictions:    Nothing in the vagina (tampons, sexual intercourse), No tub baths, pools or hot tubs for 6 weeks (showers are ok!). No lifting anything heavier than 15 lbs, no strenuous exercise for 6 weeks after surgery. Do not pull or cut any stitches that you see around your incision.   Vaginal bleeding Spotting and intermittent passage of blood clots per vagina is normal in first few weeks after surgery. If you are soaking 1 pad per hour, that is not normal and you should notify my office and seek medical attention right away.   Vaginal discharge -  Vaginal discharge (all colors) is normal after vaginal surgery. If you’ve had vaginal surgery, you have sutures in your vagina which take 3 months to fully absorb. You may have vaginal discharge during this time. This is normal.

## 2023-11-14 NOTE — PRE-ANESTHESIA EVALUATION ADULT - NSANTHPEFT_GEN_ALL_CORE
General: Well appearing, no distress  CV: Regular  Pulm: Unlabored General: Well appearing, no distress  CV: Regular  Pulm: Unlabored  Neuro: Upper extremities +LUE numbness, otherwise grossly intact in all other extremities

## 2023-11-14 NOTE — PRE-ANESTHESIA EVALUATION ADULT - BMI (KG/M2)
HOSPITALIST DISCHARGE SUMMARY       [facility] MEDICINE DISCHARGE SUMMARY NOTE    Patient: David Olsen Discharge Date: 8/30/2022   YOB: 1967 Admission Date: 8/29/2022  5:20 PM   MRN: [id number] Admission Length: 0 day(s)     PCP: Natacha May DO    Admitting Physician: Neo Lynn MD Discharge Physician: Leon Leigh MD     Admission Information     Admission Diagnoses:   Peritonsillar abscess [J36]    Admission Condition: fair    Condition at Discharge:  stable     Primary Discharge Diagnoses:   Peritonsillar abscess  (primary encounter diagnosis)    Secondary Discharge Diagnoses:   Patient Active Problem List   Diagnosis   • Chronic pain of left ankle   • Blepharitis of upper and lower eyelids of both eyes   • Mixed hyperlipidemia   • Chronic right shoulder pain   • Peritonsillar abscess     Code status/Goals of care discussion:  Full Resuscitation       TCM Follow up      Radiology tests requiring followup: per ENT    Other tests/treatment requiring follow up/Studies pending at time of discharge: None     Needs further Goals of care discussion No     Discharge Medications:      What to Do with Your Medications      START taking these medications today unless otherwise stated      Details   acetaminophen 325 MG tablet  Commonly known as: TYLENOL      Take 2 tablets by mouth every 4 hours as needed for Pain or Fever.  Authorizing Provider: Leon Leigh MD     amoxicillin-clavulanate 875-125 MG per tablet  Commonly known as: Augmentin  Next Dose Due: 8/30/22 Take first dose after picking up meds.  Notes to patient: Antibiotic      Take 1 tablet by mouth every 12 hours for 14 days. Take with food.  Authorizing Provider: Leon Leigh MD     [place]-Bid Probiotic Tab  Next Dose Due: 8/30/22 Take at least 2 hours after antibiotic.  Notes to patient: Probiotic.      Take 1 tablet by mouth daily.  Authorizing Provider: Leon Leigh MD                     CONTINUE taking these medications which have CHANGED       Details   ibuprofen 600 MG tablet  Commonly known as: MOTRIN  What changed:   · medication strength  · how much to take  · when to take this      Take 1 tablet by mouth every 8 hours as needed for Pain.  Authorizing Provider: Leon Leigh MD                     CONTINUE taking these medications which have NOT CHANGED      Details   atorvastatin 40 MG tablet  Commonly known as: LIPITOR  Next Dose Due: 8/31/22 Take in the morning.      Take 1 tablet by mouth daily.  Authorizing Provider: Natacha May DO     cetirizine 10 MG tablet  Commonly known as: ZyrTEC      TAKE 1 Tablet BY MOUTH 1 TIME A DAY AS NEEDED for allergy SYMPTOMS and FOR CONGESTION                     STOP taking these medications, discuss with your Pharmacist      Reason for stopping Comments   clindamycin 300 MG capsule  Commonly known as: CLEOCIN                              Where to Get Your Medications      These medications were sent to [id number] - [place], [place] - [address], [address]  [address] [facility] [address], [facility] [address]    Hours: M-F 8-8, SA 9-4, MENDIETA 9-3 Phone: [phone]   · acetaminophen 325 MG tablet  · amoxicillin-clavulanate 875-125 MG per tablet  · ibuprofen 600 MG tablet  · [place]-Bid Probiotic Tab       Home Health referral:  No                Follow-up    Follow up with Natacha May DO in a week    Discharge Instructions     Diet: resume prior diet Activity:  activity as tolerated   Wound Care: as directed Disposition: Home     Other instructions:     None     Hospital Course   Admission Narrative:    Per H&P\" This patient is a 54-year-old male with past medical history for hyperlipidemia presents with sore throat.     Patient reports about 4 days ago he developed sore throat and swollen sensation is localized to the right side.  He reports difficulty swallowing solid food but has been able to drink liquids. He denies any chest pain denies shortness of breath.  Denies cough.  He reports  subjective fever and chills.      The following medical problems have been addressed while patient is hospitalized:    3.5 cm right peritonsillar abscess  -significantly improved with treatment Unasyn and Decadron  -patient tolerating diet without significant pain  -discussed with ENT, patient will be discharged to [facility].    -Continue Augmentin for 14 days, pain control with Tylenol and ibuprofen    On day of discharge, patient is hemodynamically stable, thus is well enough to be discharged.     Consultants:  IP Consult Orders (From admission, onward)             Start     Ordered    08/29/22 2010  Inpatient consult to Otolaryngology  ONE TIME        Provider:  Gregorio Fraga MD    08/29/22 2010                  Discharge Physical Exam     Vital Signs:   Visit Vitals  /79 (BP Location: RUE - Right upper extremity, Patient Position: Semi-Guerra's)   Pulse 74   Temp 98 °F (36.7 °C) (Oral)   Resp 18   Ht 5' 6\" (1.676 m)   Wt 72.1 kg (158 lb 15.2 oz)   SpO2 98%   BMI 25.66 kg/m²       Wt Readings from Last 3 Encounters:   08/29/22 72.1 kg (158 lb 15.2 oz)   08/29/22 74.3 kg (163 lb 12.8 oz)   08/27/21 76.3 kg (168 lb 4.8 oz)       General - Patient is in no acute distress.    There is erythema, swelling in right tonsil area  CV - S1, S2, RRR. No murmurs, gallops or rubs. No LE edema  Pulmonary - Good respiratory effort. Lungs are clear to auscultation bilaterally.  Abdomen - Soft, non-tender and nondistended. Normoactive bowel sounds. No hepatosplenomegaly.  Skin - Warm and well perfused. No lesions.  Neurologic - CNs II-XII are grossly intact.   Psychiatric-AAOX3.    Time taken to coordinate discharge care:  Less than 30 minutes.    Discharge instructions, medications and followup appointment were discussed with the patient/family and after visit summary was given.     Leon Leigh MD  8/30/2022  9:37 AM    CC:   Natacha May DO       25.5

## 2023-11-14 NOTE — DISCHARGE NOTE PROVIDER - CARE PROVIDER_API CALL
Abraham Buchanan  Urogyn and Reconst Pelvic Surg  [address]  [place] [address]  Phone: [phone]  Fax: [phone]  Scheduled Appointment: 11/27/2023 12:30 PM

## 2023-11-14 NOTE — DISCHARGE NOTE PROVIDER - NSDCFUSCHEDAPPT_GEN_ALL_CORE_FT
[facility] Physician [facility]  UROGYN [address]  Scheduled Appointment: 11/27/2023    Silvana Wolf  [facility] Physician [facility]  ONCPAINMGT [address]  Scheduled Appointment: 11/28/2023

## 2023-11-15 ENCOUNTER — TRANSCRIPTION ENCOUNTER (OUTPATIENT)
Age: 63
End: 2023-11-15

## 2023-11-15 VITALS
HEART RATE: 65 BPM | RESPIRATION RATE: 18 BRPM | TEMPERATURE: 98 F | OXYGEN SATURATION: 95 % | SYSTOLIC BLOOD PRESSURE: 121 MMHG | DIASTOLIC BLOOD PRESSURE: 82 MMHG

## 2023-11-15 DIAGNOSIS — M54.2 CERVICALGIA: ICD-10-CM

## 2023-11-15 DIAGNOSIS — E78.5 HYPERLIPIDEMIA, UNSPECIFIED: ICD-10-CM

## 2023-11-15 DIAGNOSIS — I10 ESSENTIAL (PRIMARY) HYPERTENSION: ICD-10-CM

## 2023-11-15 LAB
BASOPHILS # BLD AUTO: 0.01 K/UL — SIGNIFICANT CHANGE UP (ref 0–0.2)
BASOPHILS # BLD AUTO: 0.01 K/UL — SIGNIFICANT CHANGE UP (ref 0–0.2)
BASOPHILS NFR BLD AUTO: 0.2 % — SIGNIFICANT CHANGE UP (ref 0–2)
BASOPHILS NFR BLD AUTO: 0.2 % — SIGNIFICANT CHANGE UP (ref 0–2)
EOSINOPHIL # BLD AUTO: 0.01 K/UL — SIGNIFICANT CHANGE UP (ref 0–0.5)
EOSINOPHIL # BLD AUTO: 0.01 K/UL — SIGNIFICANT CHANGE UP (ref 0–0.5)
EOSINOPHIL NFR BLD AUTO: 0.2 % — SIGNIFICANT CHANGE UP (ref 0–6)
EOSINOPHIL NFR BLD AUTO: 0.2 % — SIGNIFICANT CHANGE UP (ref 0–6)
HCT VFR BLD CALC: 28.8 % — LOW (ref 34.5–45)
HCT VFR BLD CALC: 28.8 % — LOW (ref 34.5–45)
HCT VFR BLD CALC: 31.8 % — LOW (ref 34.5–45)
HCT VFR BLD CALC: 31.8 % — LOW (ref 34.5–45)
HGB BLD-MCNC: 10.7 G/DL — LOW (ref 11.5–15.5)
HGB BLD-MCNC: 10.7 G/DL — LOW (ref 11.5–15.5)
HGB BLD-MCNC: 9.7 G/DL — LOW (ref 11.5–15.5)
HGB BLD-MCNC: 9.7 G/DL — LOW (ref 11.5–15.5)
IMM GRANULOCYTES NFR BLD AUTO: 0.5 % — SIGNIFICANT CHANGE UP (ref 0–0.9)
IMM GRANULOCYTES NFR BLD AUTO: 0.5 % — SIGNIFICANT CHANGE UP (ref 0–0.9)
LYMPHOCYTES # BLD AUTO: 0.94 K/UL — LOW (ref 1–3.3)
LYMPHOCYTES # BLD AUTO: 0.94 K/UL — LOW (ref 1–3.3)
LYMPHOCYTES # BLD AUTO: 16.9 % — SIGNIFICANT CHANGE UP (ref 13–44)
LYMPHOCYTES # BLD AUTO: 16.9 % — SIGNIFICANT CHANGE UP (ref 13–44)
MCHC RBC-ENTMCNC: 31.8 PG — SIGNIFICANT CHANGE UP (ref 27–34)
MCHC RBC-ENTMCNC: 31.8 PG — SIGNIFICANT CHANGE UP (ref 27–34)
MCHC RBC-ENTMCNC: 33.7 GM/DL — SIGNIFICANT CHANGE UP (ref 32–36)
MCHC RBC-ENTMCNC: 33.7 GM/DL — SIGNIFICANT CHANGE UP (ref 32–36)
MCV RBC AUTO: 94.4 FL — SIGNIFICANT CHANGE UP (ref 80–100)
MCV RBC AUTO: 94.4 FL — SIGNIFICANT CHANGE UP (ref 80–100)
MONOCYTES # BLD AUTO: 0.57 K/UL — SIGNIFICANT CHANGE UP (ref 0–0.9)
MONOCYTES # BLD AUTO: 0.57 K/UL — SIGNIFICANT CHANGE UP (ref 0–0.9)
MONOCYTES NFR BLD AUTO: 10.3 % — SIGNIFICANT CHANGE UP (ref 2–14)
MONOCYTES NFR BLD AUTO: 10.3 % — SIGNIFICANT CHANGE UP (ref 2–14)
NEUTROPHILS # BLD AUTO: 4 K/UL — SIGNIFICANT CHANGE UP (ref 1.8–7.4)
NEUTROPHILS # BLD AUTO: 4 K/UL — SIGNIFICANT CHANGE UP (ref 1.8–7.4)
NEUTROPHILS NFR BLD AUTO: 71.9 % — SIGNIFICANT CHANGE UP (ref 43–77)
NEUTROPHILS NFR BLD AUTO: 71.9 % — SIGNIFICANT CHANGE UP (ref 43–77)
NRBC # BLD: 0 /100 WBCS — SIGNIFICANT CHANGE UP (ref 0–0)
NRBC # BLD: 0 /100 WBCS — SIGNIFICANT CHANGE UP (ref 0–0)
PLATELET # BLD AUTO: 210 K/UL — SIGNIFICANT CHANGE UP (ref 150–400)
PLATELET # BLD AUTO: 210 K/UL — SIGNIFICANT CHANGE UP (ref 150–400)
RBC # BLD: 3.05 M/UL — LOW (ref 3.8–5.2)
RBC # BLD: 3.05 M/UL — LOW (ref 3.8–5.2)
RBC # FLD: 12.4 % — SIGNIFICANT CHANGE UP (ref 10.3–14.5)
RBC # FLD: 12.4 % — SIGNIFICANT CHANGE UP (ref 10.3–14.5)
WBC # BLD: 5.56 K/UL — SIGNIFICANT CHANGE UP (ref 3.8–10.5)
WBC # BLD: 5.56 K/UL — SIGNIFICANT CHANGE UP (ref 3.8–10.5)
WBC # FLD AUTO: 5.56 K/UL — SIGNIFICANT CHANGE UP (ref 3.8–10.5)
WBC # FLD AUTO: 5.56 K/UL — SIGNIFICANT CHANGE UP (ref 3.8–10.5)

## 2023-11-15 RX ORDER — IBUPROFEN 200 MG
1 TABLET ORAL
Qty: 0 | Refills: 0 | DISCHARGE
Start: 2023-11-15

## 2023-11-15 RX ORDER — ACETAMINOPHEN 500 MG
3 TABLET ORAL
Qty: 0 | Refills: 0 | DISCHARGE
Start: 2023-11-15

## 2023-11-15 RX ORDER — OXYCODONE HYDROCHLORIDE 5 MG/1
1 TABLET ORAL
Qty: 20 | Refills: 0
Start: 2023-11-15 | End: 2023-11-19

## 2023-11-15 RX ORDER — OXYCODONE HYDROCHLORIDE 5 MG/1
2.5 TABLET ORAL ONCE
Refills: 0 | Status: DISCONTINUED | OUTPATIENT
Start: 2023-11-15 | End: 2023-11-15

## 2023-11-15 RX ADMIN — FAMOTIDINE 20 MILLIGRAM(S): 10 INJECTION INTRAVENOUS at 12:17

## 2023-11-15 RX ADMIN — Medication 975 MILLIGRAM(S): at 00:51

## 2023-11-15 RX ADMIN — OXYCODONE HYDROCHLORIDE 2.5 MILLIGRAM(S): 5 TABLET ORAL at 00:21

## 2023-11-15 RX ADMIN — METHOCARBAMOL 500 MILLIGRAM(S): 500 TABLET, FILM COATED ORAL at 09:22

## 2023-11-15 RX ADMIN — Medication 975 MILLIGRAM(S): at 13:20

## 2023-11-15 RX ADMIN — Medication 600 MILLIGRAM(S): at 09:22

## 2023-11-15 RX ADMIN — Medication 975 MILLIGRAM(S): at 00:00

## 2023-11-15 RX ADMIN — OXYCODONE HYDROCHLORIDE 2.5 MILLIGRAM(S): 5 TABLET ORAL at 00:51

## 2023-11-15 RX ADMIN — METHOCARBAMOL 500 MILLIGRAM(S): 500 TABLET, FILM COATED ORAL at 02:07

## 2023-11-15 RX ADMIN — OXYCODONE HYDROCHLORIDE 7.5 MILLIGRAM(S): 5 TABLET ORAL at 02:36

## 2023-11-15 RX ADMIN — Medication 600 MILLIGRAM(S): at 09:55

## 2023-11-15 RX ADMIN — Medication 600 MILLIGRAM(S): at 02:06

## 2023-11-15 RX ADMIN — Medication 975 MILLIGRAM(S): at 12:18

## 2023-11-15 RX ADMIN — ATORVASTATIN CALCIUM 80 MILLIGRAM(S): 80 TABLET, FILM COATED ORAL at 05:11

## 2023-11-15 RX ADMIN — GABAPENTIN 300 MILLIGRAM(S): 400 CAPSULE ORAL at 05:11

## 2023-11-15 RX ADMIN — OXYCODONE HYDROCHLORIDE 7.5 MILLIGRAM(S): 5 TABLET ORAL at 02:06

## 2023-11-15 RX ADMIN — Medication 600 MILLIGRAM(S): at 02:36

## 2023-11-15 RX ADMIN — SIMETHICONE 80 MILLIGRAM(S): 80 TABLET, CHEWABLE ORAL at 12:18

## 2023-11-15 RX ADMIN — OXYCODONE HYDROCHLORIDE 7.5 MILLIGRAM(S): 5 TABLET ORAL at 09:55

## 2023-11-15 RX ADMIN — Medication 50 MILLIGRAM(S): at 05:11

## 2023-11-15 RX ADMIN — Medication 975 MILLIGRAM(S): at 05:10

## 2023-11-15 RX ADMIN — Medication 600 MILLIGRAM(S): at 15:52

## 2023-11-15 RX ADMIN — OXYCODONE HYDROCHLORIDE 7.5 MILLIGRAM(S): 5 TABLET ORAL at 09:22

## 2023-11-15 RX ADMIN — Medication 600 MILLIGRAM(S): at 14:51

## 2023-11-15 RX ADMIN — Medication 975 MILLIGRAM(S): at 05:40

## 2023-11-15 RX ADMIN — GABAPENTIN 300 MILLIGRAM(S): 400 CAPSULE ORAL at 14:51

## 2023-11-15 NOTE — DISCHARGE NOTE NURSING/CASE MANAGEMENT/SOCIAL WORK - PATIENT PORTAL LINK FT
You can access the FollowMyHealth Patient Portal offered by [facility] by registering at the following website: [email or web address]. By joining [employer]’s FollowMyHealth portal, you will also be able to view your health information using other applications (apps) compatible with our system.

## 2023-11-15 NOTE — PROVIDER CONTACT NOTE (OTHER) - ACTION/TREATMENT ORDERED:
MD notified. MD orders 2.5mg of oxycodone and states she will come see the pt. Pt given oxy as ordered and hot packs on abdomen. No further orders at this time.

## 2023-11-15 NOTE — PROGRESS NOTE ADULT - PROBLEM SELECTOR PLAN 1
Neuro: Cont pain regimen. Awaiting acute pain consult for additional rec's.  CV: Hemodynamically stable. Anemia. Consider repeat CBC at noon.   Pulm: O2 sat WNL on RA, Increase ambulation, encourage incentive spirometry use.  GI: Tolerating  reg diet.   : UOP adequate. Perform trial of void this morning pending meeting postop milestones.  Heme: DVT ppx: HSQ, SCDs while in bed.  ID: Afebrile, No signs of infection  Dispo: Continue to monitor. Likely discharge home this afternoon.
1. Neuro: Analgesia PRN. acetaminophen     Tablet .. 975 milliGRAM(s) Oral every 6 hours  gabapentin 1100 milliGRAM(s) Oral three times a day  HYDROmorphone  Injectable 0.5 milliGRAM(s) IV Push every 10 minutes PRN  HYDROmorphone  Injectable 1 milliGRAM(s) IV Push every 10 minutes PRN  ibuprofen  Tablet. 600 milliGRAM(s) Oral every 6 hours  methocarbamol 500 milliGRAM(s) Oral every 8 hours  ondansetron Injectable 4 milliGRAM(s) IV Push every 8 hours PRN  ondansetron Injectable 4 milliGRAM(s) IV Push once PRN  oxyCODONE    IR 7.5 milliGRAM(s) Oral every 8 hours  pregabalin 50 milliGRAM(s) Oral every 12 hours   - acute pain consult  2. Card: Monitor VS. CBC in AM.   3. Pulm: Incentive spirometer use.   4. GI: Advance to regular diet. Anti-emetics PRN.  5. : Ham to gravity. TOV in AM  6. Electrolytes: LR@75cc/hr.  7. DVT ppx w/ PAS while in bed. Early ambulation, initially with assistance then as tolerated.  8. Discharge from PACU when criteria met.   d/w GYN team.

## 2023-11-15 NOTE — PROGRESS NOTE ADULT - SUBJECTIVE AND OBJECTIVE BOX
POD# 1  HD#2    Patient seen and examined at bedside. Pt reports experiencing significant pain overnight which was improved with Oxycodone.  Patient is ambulating, passing flatus and tolerating regular diet. Her mcleod is draining urine. Denies CP, SOB, N/V, fevers, and chills.    Vital Signs Last 24 Hours  T(C): 36.8 (11-15-23 @ 05:39), Max: 37.6 (11-14-23 @ 18:00)  HR: 64 (11-15-23 @ 05:39) (64 - 83)  BP: 130/78 (11-15-23 @ 05:39) (109/77 - 160/89)  RR: 18 (11-15-23 @ 05:39) (12 - 18)  SpO2: 97% (11-15-23 @ 05:39) (93% - 100%)    I&O's Summary    14 Nov 2023 07:01  -  15 Nov 2023 07:00  --------------------------------------------------------  IN: 450 mL / OUT: 1995 mL / NET: -1545 mL        Physical Exam:  General: NAD  CV: NR, RR, S1, S2, no M/R/G  Lungs: CTA-B  Abdomen: Soft, non-tender, non-distended, +BS  Incision:  Steri's c/d/i x 5 port sites.   : No vaginal bleeding.  Mcleod draining clear urine.  Ext: No pain or swelling. PAS on b/l.     Labs:                        9.7    5.56  )-----------( 210      ( 15 Nov 2023 06:27 )             28.8   baso 0.2    eos 0.2    imm gran 0.5    lymph 16.9   mono 10.3   poly 71.9     Complete Blood Count (10.25.23 @ 10:59)    Nucleated RBC: 0 /100 WBCs   WBC Count: 3.21 K/uL   RBC Count: 3.62 M/uL   Hemoglobin: 11.5 g/dL   Hematocrit: 34.0 %   Mean Cell Volume: 93.9 fl   Mean Cell Hemoglobin: 31.8 pg   Mean Cell Hemoglobin Conc: 33.8 gm/dL   Red Cell Distrib Width: 12.4 %   Platelet Count - Automated: 239 K/uL      MEDICATIONS  (STANDING):  acetaminophen     Tablet .. 975 milliGRAM(s) Oral every 6 hours  atorvastatin 80 milliGRAM(s) Oral every 24 hours  famotidine    Tablet 20 milliGRAM(s) Oral daily  gabapentin 300 milliGRAM(s) Oral three times a day  ibuprofen  Tablet. 600 milliGRAM(s) Oral every 6 hours  influenza  Vaccine (HIGH DOSE) 0.7 milliLiter(s) IntraMuscular once  lactated ringers. 1000 milliLiter(s) (75 mL/Hr) IV Continuous <Continuous>  methocarbamol 500 milliGRAM(s) Oral every 8 hours  morphine ER Tablet 15 milliGRAM(s) Oral at bedtime  oxyCODONE    IR 7.5 milliGRAM(s) Oral every 8 hours  pregabalin 50 milliGRAM(s) Oral every 12 hours  senna 2 Tablet(s) Oral at bedtime    MEDICATIONS  (PRN):  ondansetron Injectable 4 milliGRAM(s) IV Push every 8 hours PRN Nausea and/or Vomiting  simethicone 80 milliGRAM(s) Chew every 6 hours PRN Gas  
POST-OP CHECK      Allergies  No Known Allergies          S:  Pt sleeping, easily arousable  Pain controlled. Pt denies N/V, SOB, CP, palpitations. Tolerates clears.  Not OOB yet.    O:   T(C): 36.3 (11-14-23 @ 12:25), Max: 36.3 (11-14-23 @ 12:25)  HR: 75 (11-14-23 @ 15:00) (68 - 78)  BP: 148/89 (11-14-23 @ 15:00) (139/78 - 160/89)  RR: 16 (11-14-23 @ 15:00) (12 - 17)  SpO2: 100% (11-14-23 @ 15:00) (93% - 100%)  Wt(kg): --  I&O's Summary    14 Nov 2023 07:01  -  14 Nov 2023 15:33  --------------------------------------------------------  IN: 225 mL / OUT: 95 mL / NET: 130 mL        CV: S1S2, RRR  Lungs: CTA B/L  Abd: soft, appropriately tender, occassional BS x 4 quadrants  Inc: Clean/dry/intact  Ext: PAS in place, Neg Homans B/L    Brief Op Note   PROCEDURES:  Exam under anesthesia, pelvic 14-Nov-2023 12:24:02  Allie Bonilla  Robot-assisted laparoscopic supracervical hysterectomy with cystoscopy 14-Nov-2023 12:24:13  Allie Bonilla  Bilateral salpingectomy 14-Nov-2023 12:24:24  Allie Bonilla  Lysis of pelvic adhesions 14-Nov-2023 12:24:32  Allie Bonilla  Robot-assisted sacrocolpopexy 14-Nov-2023 12:24:56  Allie Bonilla  Creation, midurethral sling, female 14-Nov-2023 12:25:07  Allie Bonilla.      Operative Findings:  · Operative Findings	Normal appearing external female genitalia with stage III pelvic organ prolapse. Laparoscopically, uterus and bilateral ovaries were normal in appearance. Evidence of bilateral tubal ligation noted, otherwise normal appearing bilateral fallopian tubes. Small omental adhesion to right anterior pelvic/ lower abdominal wall.     On cystoscopy, the bladder mucosa was normal in appearance, and the bladder was noted to be intact with no defect, foreign body, suture, or mesh. Bilateral ureteral orifices were visualized with vigorous efflux of clear yellow urine noted bilaterally. Vaginal exam at conclusion of the case without mesh or suture exposure.    Evidence of Infection or Abscess:  · Evidence of infection or abscess identified at the start or during the surgical procedure:	No      Lesion Size:  · Lesion Size (Size of excised specimen is required for skin lesions)	N/A    Specimens/Blood Loss/IV/Output/Protocol/VTE:   Specimens/Blood Loss/IV/Output/Protocol/VTE:  · Specimens	uterus, segment of bilateral fallopian tubes  · Drains	Ham catheter  · Estimated Blood Loss	50 milliLiter(s)  · IV Infusions - Crystalloids (mL)	1,300  · Urine Output (mL)	600 mL

## 2023-11-15 NOTE — PROGRESS NOTE ADULT - ASSESSMENT
62 y/o female w/ pmhx HTN, HLD, OA, cervical radiculopathy, pelvic organ prolapse now POD#1 s/p RA BHANU, BS, KG, SCC, MUS and cysto, doing well.  64 y/o female w/ pmhx HTN, HLD, OA, cervical radiculopathy, pelvic organ prolapse now POD#1 s/p RA BHANU, BS, KG, SCC, MUS and cysto, doing well.     -------------  Fellow Addendum    Patient seen and examined.  Patient reports pain overnight only controlled with oxycodone.  Of note, at baseline she reports taking norco 3x per day for her chronic neck pain.  Otherwise she has been OOB/ambulating.  Tolerating PO intake.    T(C): 36.8 (11-15-23 @ 05:39), Max: 37.6 (11-14-23 @ 18:00)  HR: 64 (11-15-23 @ 05:39) (64 - 83)  BP: 130/78 (11-15-23 @ 05:39) (109/77 - 160/89)  RR: 18 (11-15-23 @ 05:39) (12 - 18)  SpO2: 97% (11-15-23 @ 05:39) (93% - 100%)    I&O's Summary    14 Nov 2023 07:01  -  15 Nov 2023 07:00  --------------------------------------------------------  IN: 450 mL / OUT: 1995 mL / NET: -1545 mL      Physical Exam  Gen: NAD  HEENT: NCAT, sclera anicteric  Resp: non-labored  Abd: soft, non-distended, appropriately tender; incisions clean, dry, intact  : mcleod draining clear urine; pad with <5 cc blood                          9.7    5.56  )-----------( 210      ( 15 Nov 2023 06:27 )             28.8       63F w/ hx of chronic neck pain on POD#1 s/p RA BHANU, SCP, BS, KG, midurethral sling, cystoscopy, doing well.   -optimize pain control  -dc IV fluids  -regular diet  -oob/ambulation  -dvt prophylaxis  -void trial this am  -dispo: home    Neida Harding MD  Urogynecology Fellow, PGY-7

## 2023-11-15 NOTE — PROVIDER CONTACT NOTE (OTHER) - ASSESSMENT
Pt calls RN to room crying in pain. States the pain is 10/10 and on her incisions. Tylenol given, pt not yet due for any other pain meds.

## 2023-11-15 NOTE — CHART NOTE - NSCHARTNOTEFT_GEN_A_CORE
OBGYN PA Note:    Pt seen and examined for trial of void. Pt doing well, tolerated breakfast, passing small flatus.  Ambulated the hallway without difficulty.    Ham bag discarded.  300cc of normal saline passively infused into the bladder via gravity.  Pt ambulated to bathroom where catheter removed.  Pt spontaneously voided >340cc.       D/C home.      d/w urogyn team.    LUZ MARIA Snell
documentation delayed secondary to clinical duties. Pt evaluated at 1230am.    63y Female PMH chronic neck pain POD 0 S/P BHANU/BS, KG, SCC, MUS, cystoscopy. Called to bedside by nursing after pt complaining of uncontrolled pain. Pt says that she has stabbing pain on RUQ and near her incisions. Pt received morphine 15mg, lyrica 50mg, oxycodone 7.5 mg, Tylenol, not due for any pain medication.     ICU Vital Signs Last 24 Hrs  T(C): 37.1 (14 Nov 2023 23:54), Max: 37.6 (14 Nov 2023 18:00)  T(F): 98.8 (14 Nov 2023 23:54), Max: 99.7 (14 Nov 2023 18:00)  HR: 76 (14 Nov 2023 23:54) (68 - 83)  BP: 118/60 (14 Nov 2023 23:54) (109/77 - 160/89)  BP(mean): 89 (14 Nov 2023 18:20) (89 - 119)  ABP: --  ABP(mean): --  RR: 18 (14 Nov 2023 23:54) (12 - 18)  SpO2: 97% (14 Nov 2023 23:54) (93% - 100%)    O2 Parameters below as of 14 Nov 2023 23:54  Patient On (Oxygen Delivery Method): room air      Exam:  GA: lying in bed, tense and in pain  CV: well perfused  Pulm: breathing comfortably on RA  Abd: robotic port site incisions and suprapubic sling incisions covered in dermabond dressing. Soft, tender to palpation near incision. No rebound.   Pelvic: mcleod in place draining yellow urine    63y PMH chronic neck pain POD 0 S/P BHANU/BS, KG, SCC, MUS, cystoscopy with poorly controlled pain.   - additional 2.5 mg oxycodone given  - pt due for more medication in 90min, communicated to nurse she can give in 1h if needed  - heat packs to abdomen PRN  - should pt acutely worsen, please call [phone]    Dr. Bonilla aware  Glory Aguirre PGY2

## 2023-11-15 NOTE — DISCHARGE NOTE NURSING/CASE MANAGEMENT/SOCIAL WORK - NSDCPEFALRISK_GEN_ALL_CORE
For information on Fall & Injury Prevention, visit: [email or web address] OR  [email or web address] OR  https://www.cdc.gov/steadi/patient.html

## 2023-11-16 PROCEDURE — 85018 HEMOGLOBIN: CPT

## 2023-11-16 PROCEDURE — C9399: CPT

## 2023-11-16 PROCEDURE — C1781: CPT

## 2023-11-16 PROCEDURE — C1771: CPT

## 2023-11-16 PROCEDURE — 85014 HEMATOCRIT: CPT

## 2023-11-16 PROCEDURE — 82962 GLUCOSE BLOOD TEST: CPT

## 2023-11-16 PROCEDURE — S2900: CPT

## 2023-11-16 PROCEDURE — 58542 LSH W/T/O UT 250 G OR LESS: CPT

## 2023-11-16 PROCEDURE — 57288 REPAIR BLADDER DEFECT: CPT

## 2023-11-16 PROCEDURE — 85025 COMPLETE CBC W/AUTO DIFF WBC: CPT

## 2023-11-16 PROCEDURE — C1889: CPT

## 2023-11-16 PROCEDURE — 57425 LAPAROSCOPY SURG COLPOPEXY: CPT

## 2023-11-16 PROCEDURE — 88307 TISSUE EXAM BY PATHOLOGIST: CPT

## 2023-11-16 PROCEDURE — 88302 TISSUE EXAM BY PATHOLOGIST: CPT

## 2023-11-20 LAB
SURGICAL PATHOLOGY STUDY: SIGNIFICANT CHANGE UP
SURGICAL PATHOLOGY STUDY: SIGNIFICANT CHANGE UP

## 2023-11-27 ENCOUNTER — APPOINTMENT (OUTPATIENT)
Dept: UROGYNECOLOGY | Facility: CLINIC | Age: 63
End: 2023-11-27
Payer: MEDICARE

## 2023-11-27 VITALS
WEIGHT: 150 LBS | SYSTOLIC BLOOD PRESSURE: 144 MMHG | BODY MASS INDEX: 23.54 KG/M2 | DIASTOLIC BLOOD PRESSURE: 88 MMHG | HEIGHT: 67 IN

## 2023-11-27 PROCEDURE — 99024 POSTOP FOLLOW-UP VISIT: CPT

## 2023-11-28 ENCOUNTER — APPOINTMENT (OUTPATIENT)
Dept: PAIN MANAGEMENT | Facility: CLINIC | Age: 63
End: 2023-11-28
Payer: MEDICARE

## 2023-11-28 VITALS — HEIGHT: 67 IN | BODY MASS INDEX: 23.54 KG/M2 | WEIGHT: 150 LBS

## 2023-11-28 PROCEDURE — 99213 OFFICE O/P EST LOW 20 MIN: CPT

## 2023-12-22 NOTE — ED ADULT TRIAGE NOTE - PATIENT ON (OXYGEN DELIVERY METHOD)
ORQUIDEA SANCHEZ  is a  64  male   who presents today for a  EGD   for   the indications listed below. The updated Patient Profile was reviewed prior to the procedure, in conjunction with the Physical Exam, including medical conditions, surgical procedures, medications, allergies, family history and social history. See Physical Exam time stamp below for date and time of HPI completion.Pre-operatively, I reviewed the indication(s) for the procedure, the risks of the procedure [including but not limited to: unexpected bleeding possibly requiring hospitalization and/or unplanned repeat procedures, perforation possibly requiring surgical treatment, missed lesions and complications of sedation/general anesthesia (also explained by anesthesia staff)]. I have evaluated the patient for risks associated with the planned anesthesia and the procedure to be performed and find the patient an acceptable candidate for IV sedation.Multiple opportunities were provided for any questions or concerns, and all questions were answered satisfactorily before any anesthesia was administered. We will proceed with the planned procedure.br room air

## 2023-12-27 ENCOUNTER — APPOINTMENT (OUTPATIENT)
Dept: UROGYNECOLOGY | Facility: CLINIC | Age: 63
End: 2023-12-27

## 2023-12-27 ENCOUNTER — APPOINTMENT (OUTPATIENT)
Dept: PAIN MANAGEMENT | Facility: CLINIC | Age: 63
End: 2023-12-27
Payer: MEDICARE

## 2023-12-27 VITALS — BODY MASS INDEX: 23.54 KG/M2 | WEIGHT: 150 LBS | HEIGHT: 67 IN

## 2023-12-27 DIAGNOSIS — M43.22 FUSION OF SPINE, CERVICAL REGION: ICD-10-CM

## 2023-12-27 PROCEDURE — 99213 OFFICE O/P EST LOW 20 MIN: CPT | Mod: 95

## 2023-12-27 NOTE — SUBJECTIVE
[FreeTextEntry1] : Feels generally well [FreeTextEntry7] : Pain well-controlled [FreeTextEntry6] :  Tolerating regular diet without nausea [FreeTextEntry5] : Denies CATRACHITA. Denies urinary frequency, urgency, or dysuria. [FreeTextEntry4] : Having regular BMs without issue [FreeTextEntry3] : Ambulating well without issue

## 2023-12-27 NOTE — DISCUSSION/SUMMARY
[Medication Risks Reviewed] : Medication risks reviewed [de-identified] : Prescriptions renewed. Opioid agreement/obtained on chart [place]  reviewed and appropriate. SOAPP-R completed on chart. The patient's medications are documented to the best of their ability. Quality of life and functional ability improved on medications. The patient is showing no aberrant behavior or evidence of diversion. The patient was advised not to use narcotic medication while operating an automobile or heavy machinery due to potential sedation or dizziness. The patient was educated to the risks associated with potential opioid dependence and addiction. Urine toxicology screens as per office protocol. Use of multimodal analgesia used prn. Follow up one month.

## 2023-12-27 NOTE — HISTORY OF PRESENT ILLNESS
[Neck] : neck [Gradual] : gradual [7] : 7 [Radiating] : radiating [Sharp] : sharp [Stabbing] : stabbing [Intermittent] : intermittent [Sleep] : sleep [Rest] : rest [Meds] : meds [Heat] : heat [Nothing helps with pain getting better] : Nothing helps with pain getting better [Sitting] : sitting [Standing] : standing [Walking] : walking [Bending forward] : bending forward [Full time] : Work status: full time [FreeTextEntry1] : Neck pain stable. Meds effective. She. continues working.  [] : This patient has had an injection before: no [FreeTextEntry6] : PUSHING PAIN , PULLING , SHOCKING PAIN WHEN TURNING HEAD  [FreeTextEntry7] : HEAD DOWN LEFT ARM LEFT HAND NUMBNESS - " FIRE HOT NUMBNESS "  [FreeTextEntry9] : LAYING DOWN  [de-identified] : CERTAIN POSITONS  [de-identified] : 01/10/2023 [de-identified] : 2023-03-01 [de-identified] : mri 2023-07-08 cervical  [de-identified] : PT WENT TO PHYSICAL THERAPY FROM 2023-02-01 UNTIL 2023-03-01 GOING FOR 2X WEEK  as of 2023-12-27  PT IS CONTINUING WITH HOME EXERCISES /STRETCHED 5-7X / WEEK , HELPING BY STRENGTHENING

## 2023-12-27 NOTE — DISCUSSION/SUMMARY
[FreeTextEntry1] : Joleen is a 64 yo who is s/p RA BHANU BS SCP retropubic MUS cystoscopy 11/14/23, doing well.    - Discussed that patient no longer has any postoperative restrictions. - Surgical pathology reviewed: benign - She may follow up as needed. Patient verbalized understanding.  All questions answered.

## 2023-12-27 NOTE — REASON FOR VISIT
[Follow-Up Visit] : a follow-up pain management visit [Home] : at home, [unfilled] , at the time of the visit. [Medical Office: ([facility])___] : at the medical office located in  [Patient] : the patient [Self] : self [FreeTextEntry2] : MED REFILL

## 2024-01-17 ENCOUNTER — APPOINTMENT (OUTPATIENT)
Dept: UROGYNECOLOGY | Facility: CLINIC | Age: 64
End: 2024-01-17
Payer: MEDICARE

## 2024-01-17 VITALS
WEIGHT: 160 LBS | HEIGHT: 67 IN | HEART RATE: 71 BPM | DIASTOLIC BLOOD PRESSURE: 79 MMHG | BODY MASS INDEX: 25.11 KG/M2 | SYSTOLIC BLOOD PRESSURE: 113 MMHG

## 2024-01-17 DIAGNOSIS — N39.0 URINARY TRACT INFECTION, SITE NOT SPECIFIED: ICD-10-CM

## 2024-01-17 DIAGNOSIS — Z98.890 OTHER SPECIFIED POSTPROCEDURAL STATES: ICD-10-CM

## 2024-01-17 LAB
BILIRUB UR QL STRIP: NORMAL
GLUCOSE UR-MCNC: NORMAL
HCG UR QL: 0.2 EU/DL
HGB UR QL STRIP.AUTO: ABNORMAL
KETONES UR-MCNC: NORMAL
LEUKOCYTE ESTERASE UR QL STRIP: ABNORMAL
NITRITE UR QL STRIP: NORMAL
PH UR STRIP: 6
PROT UR STRIP-MCNC: NORMAL
SP GR UR STRIP: >=1.03

## 2024-01-17 PROCEDURE — 99024 POSTOP FOLLOW-UP VISIT: CPT

## 2024-01-17 RX ORDER — SULFAMETHOXAZOLE AND TRIMETHOPRIM 800; 160 MG/1; MG/1
800-160 TABLET ORAL TWICE DAILY
Qty: 6 | Refills: 0 | Status: ACTIVE | COMMUNITY
Start: 2024-01-17 | End: 1900-01-01

## 2024-01-17 NOTE — DISCUSSION/SUMMARY
[FreeTextEntry1] : Joleen is a 62 yo who is s/p RA BHANU BS SCP retropubic MUS cystoscopy 11/14/23, doing well overall though with concerns for UTI today.    # Suspected UTI - Symptoms c/w UTI. Catheterized Udip today with trace blood and small leuks. Will send for UCx and empirically treat with Bactrim. Allergies and preferred pharmacy reviewed - RTO 2 mo to follow up symptoms  #Postop - Discussed that patient no longer has any postoperative restrictions. - Surgical pathology previously reviewed, benign   Patient verbalized understanding.  All questions answered.

## 2024-01-17 NOTE — OBJECTIVE
[Soft and Nontender] : soft and nontender [Clean, Dry, Intact] : Clean, Dry, Intact [Good Support] : Good support [Healing well] : healing well [No Masses or Tenderness] : no masses or tenderness [FreeTextEntry2] : knot on left medial port site sticking through skin - cut with suture removal kit [FreeTextEntry3] : No mesh tenderness or exposure. Posterior prolapse noted with apex to 0.

## 2024-01-17 NOTE — SUBJECTIVE
[FreeTextEntry1] : Feels generally well [FreeTextEntry7] : Pain well-controlled overall. She does report "constant pain in the bladder" when she urinates that has been going on since 1/14.  [FreeTextEntry6] : Tolerating regular diet without nausea [FreeTextEntry5] : Reports frequency, nocturia x 4 since 1/14. Has been taking azo since that time, last dose yesterday at 1600. Denies CATRACHITA or incomplete bladder emptying. Denies urgency or dysuria. [FreeTextEntry4] : Having regular BMs without issue [FreeTextEntry3] : Ambulating well without issue [FreeTextEntry9] : Denies prolapse symptoms.

## 2024-01-21 ENCOUNTER — NON-APPOINTMENT (OUTPATIENT)
Age: 64
End: 2024-01-21

## 2024-01-21 LAB — BACTERIA UR CULT: ABNORMAL

## 2024-01-26 ENCOUNTER — APPOINTMENT (OUTPATIENT)
Dept: PAIN MANAGEMENT | Facility: CLINIC | Age: 64
End: 2024-01-26
Payer: MEDICARE

## 2024-01-26 DIAGNOSIS — M96.1 POSTLAMINECTOMY SYNDROME, NOT ELSEWHERE CLASSIFIED: ICD-10-CM

## 2024-01-26 PROCEDURE — 99213 OFFICE O/P EST LOW 20 MIN: CPT | Mod: 95

## 2024-01-26 NOTE — HISTORY OF PRESENT ILLNESS
[Neck] : neck [Gradual] : gradual [8] : 8 [7] : 7 [Radiating] : radiating [Sharp] : sharp [Stabbing] : stabbing [Intermittent] : intermittent [Sleep] : sleep [Rest] : rest [Meds] : meds [Heat] : heat [Nothing helps with pain getting better] : Nothing helps with pain getting better [Standing] : standing [Sitting] : sitting [Walking] : walking [Bending forward] : bending forward [Full time] : Work status: full time [FreeTextEntry1] : Neck pain severe with weather changes. Meds effective prn. She continues working.  [] : This patient has had an injection before: no [FreeTextEntry6] : PUSHING PAIN , PULLING , SHOCKING PAIN WHEN TURNING HEAD  [FreeTextEntry7] : HEAD DOWN LEFT ARM LEFT HAND NUMBNESS - " FIRE HOT NUMBNESS "  [de-identified] : CERTAIN POSITONS  [FreeTextEntry9] : LAYING DOWN  [de-identified] : 01/10/2023 [de-identified] : 2023-03-01 [de-identified] : PT WENT TO PHYSICAL THERAPY FROM 2023-02-01 UNTIL 2023-03-01 GOING FOR 2X WEEK  as of 2024-01-26  PT IS CONTINUING WITH HOME EXERCISES /STRETCHED 5-7X / WEEK , HELPING BY STRENGTHENING  [de-identified] : mri 2023-07-08 cervical

## 2024-01-26 NOTE — REASON FOR VISIT
[Follow-Up Visit] : a follow-up pain management visit [Home] : at home, [unfilled] , at the time of the visit. [Medical Office: ([facility])___] : at the medical office located in  [Patient] : the patient [Self] : self [FreeTextEntry2] : MED REFILL

## 2024-02-23 ENCOUNTER — APPOINTMENT (OUTPATIENT)
Dept: PAIN MANAGEMENT | Facility: CLINIC | Age: 64
End: 2024-02-23
Payer: MEDICARE

## 2024-02-23 PROCEDURE — 99213 OFFICE O/P EST LOW 20 MIN: CPT

## 2024-02-23 RX ORDER — NALOXONE HYDROCHLORIDE 4 MG/.1ML
4 SPRAY NASAL
Qty: 1 | Refills: 0 | Status: ACTIVE | COMMUNITY
Start: 2023-09-06 | End: 1900-01-01

## 2024-02-23 NOTE — DISCUSSION/SUMMARY
[Medication Risks Reviewed] : Medication risks reviewed [de-identified] : Prescriptions renewed. Opioid agreement/obtained on chart [place]  reviewed and appropriate. SOAPP-R completed on chart. The patient's medications are documented to the best of their ability. Quality of life and functional ability improved on medications. The patient is showing no aberrant behavior or evidence of diversion. The patient was advised not to use narcotic medication while operating an automobile or heavy machinery due to potential sedation or dizziness. The patient was educated to the risks associated with potential opioid dependence and addiction. Urine toxicology screens as per office protocol. Use of multimodal analgesia used prn. Follow up one month.

## 2024-02-23 NOTE — REASON FOR VISIT
[Follow-Up Visit] : a follow-up pain management visit [Home] : at home, [unfilled] , at the time of the visit. [Medical Office: ([facility])___] : at the medical office located in  [Patient] : the patient [Self] : self [FreeTextEntry2] : MED REFILL

## 2024-02-23 NOTE — HISTORY OF PRESENT ILLNESS
[Neck] : neck [Gradual] : gradual [8] : 8 [7] : 7 [Radiating] : radiating [Sharp] : sharp [Stabbing] : stabbing [Intermittent] : intermittent [Sleep] : sleep [Rest] : rest [Meds] : meds [Heat] : heat [Nothing helps with pain getting better] : Nothing helps with pain getting better [Sitting] : sitting [Standing] : standing [Walking] : walking [Bending forward] : bending forward [Full time] : Work status: full time [FreeTextEntry1] : States chronic neck pain varies. Meds effective. She continues working and manages a functional ADL.  [] : no [FreeTextEntry6] : PUSHING PAIN , PULLING , SHOCKING PAIN WHEN TURNING HEAD  [FreeTextEntry7] : HEAD DOWN LEFT ARM LEFT HAND NUMBNESS - " FIRE HOT NUMBNESS "  [FreeTextEntry9] : LAYING DOWN  [de-identified] : CERTAIN POSITONS  [de-identified] : 01/10/2023 [de-identified] : 2023-03-01 [de-identified] : mri 2023-07-08 cervical  [de-identified] : PT WENT TO PHYSICAL THERAPY FROM 2023-02-01 UNTIL 2023-03-01 GOING FOR 2X WEEK  as of 2024-02-23  PT IS CONTINUING WITH HOME EXERCISES /STRETCHED 5-7X / WEEK , HELPING BY STRENGTHENING

## 2024-03-13 ENCOUNTER — APPOINTMENT (OUTPATIENT)
Dept: UROGYNECOLOGY | Facility: CLINIC | Age: 64
End: 2024-03-13
Payer: MEDICARE

## 2024-03-13 DIAGNOSIS — N39.41 URGE INCONTINENCE: ICD-10-CM

## 2024-03-13 DIAGNOSIS — R30.0 DYSURIA: ICD-10-CM

## 2024-03-13 DIAGNOSIS — R39.14 FEELING OF INCOMPLETE BLADDER EMPTYING: ICD-10-CM

## 2024-03-13 DIAGNOSIS — N95.2 POSTMENOPAUSAL ATROPHIC VAGINITIS: ICD-10-CM

## 2024-03-13 LAB
BILIRUB UR QL STRIP: NORMAL
CLARITY UR: CLEAR
COLLECTION METHOD: NORMAL
GLUCOSE UR-MCNC: NORMAL
HCG UR QL: 0.2 EU/DL
HGB UR QL STRIP.AUTO: NORMAL
KETONES UR-MCNC: NORMAL
LEUKOCYTE ESTERASE UR QL STRIP: NORMAL
NITRITE UR QL STRIP: NORMAL
PH UR STRIP: 5
PROT UR STRIP-MCNC: NORMAL
SP GR UR STRIP: 1.01

## 2024-03-13 PROCEDURE — 99214 OFFICE O/P EST MOD 30 MIN: CPT | Mod: 25

## 2024-03-13 PROCEDURE — 51701 INSERT BLADDER CATHETER: CPT

## 2024-03-13 PROCEDURE — 81003 URINALYSIS AUTO W/O SCOPE: CPT | Mod: QW

## 2024-03-13 RX ORDER — ESTRADIOL 10 UG/1
10 TABLET, FILM COATED VAGINAL
Qty: 28 | Refills: 1 | Status: ACTIVE | COMMUNITY
Start: 2024-03-13 | End: 1900-01-01

## 2024-03-13 NOTE — PHYSICAL EXAM
[Chaperone Present] : A chaperone was present in the examining room during all aspects of the physical examination [No Acute Distress] : in no acute distress [Well developed] : well developed [Well Nourished] : ~L well nourished [Vulvar Atrophy] : vulvar atrophy [Normal Appearance] : general appearance was normal [Atrophy] : atrophy [Normal] : normal [Absent] : absent [Post Void Residual ____ml] : post void residual was [unfilled] ml [Tenderness] : ~T no ~M abdominal tenderness observed [Distended] : not distended [de-identified] : Good support

## 2024-03-13 NOTE — DISCUSSION/SUMMARY
[FreeTextEntry1] : I reviewed the above findings with her.  We discussed that urine dipstick was negative today with no evidence of urinary tract infection.  We discussed her urinary symptoms as possible genitourinary symptoms of menopause and we also discussed overactive bladder.  Treatment options were discussed and based on her symptoms we discussed treating genitourinary symptoms of menopause first.  Risks and benefits of vaginal estrogen were discussed and she will start such.  We discussed voiding habits as not pushing when voiding and leaning forward and relaxing.  All questions were answered.  IUGA patient information on vaginal estrogen was given to her.  I have asked her to follow-up in the office in approximately 2 months.  All questions were answered.

## 2024-03-13 NOTE — HISTORY OF PRESENT ILLNESS
[Vaginal Wall Prolapse] : no [Unable To Restrain Bowel Movement] : no [Rectal Prolapse] : no [x2] : nocturia two times a night [de-identified] : sometimes [de-identified] : sometimes  [de-identified] : sometimes [de-identified] : ometimes  [FreeTextEntry1] : Patient here today for follow-up as she states that she feels UTI symptoms frequently.  She feels a pain with void, feels likes something drop and the urine stream comes out - pt  pushes to void which she states she always did but now is difficult. She is status post a robotic assisted supracervical hysterectomy, bilateral salpingectomy, sacrocolpopexy, and mid urethral sling in November.

## 2024-03-22 ENCOUNTER — APPOINTMENT (OUTPATIENT)
Dept: PAIN MANAGEMENT | Facility: CLINIC | Age: 64
End: 2024-03-22
Payer: MEDICARE

## 2024-03-22 PROCEDURE — 99213 OFFICE O/P EST LOW 20 MIN: CPT

## 2024-03-22 RX ORDER — PREGABALIN 50 MG/1
50 CAPSULE ORAL
Qty: 180 | Refills: 0 | Status: ACTIVE | COMMUNITY
Start: 2023-04-17 | End: 1900-01-01

## 2024-03-22 RX ORDER — METHOCARBAMOL 500 MG/1
500 TABLET, FILM COATED ORAL 3 TIMES DAILY
Qty: 270 | Refills: 1 | Status: ACTIVE | COMMUNITY
Start: 2017-01-12 | End: 1900-01-01

## 2024-03-22 NOTE — REASON FOR VISIT
[Follow-Up Visit] : a follow-up pain management visit [Home] : at home, [unfilled] , at the time of the visit. [Medical Office: ([facility])___] : at the medical office located in  [Patient] : the patient [Self] : self [FreeTextEntry2] : MED REFILL

## 2024-03-22 NOTE — DISCUSSION/SUMMARY
[Medication Risks Reviewed] : Medication risks reviewed [de-identified] : Prescriptions renewed. Opioid agreement/obtained on chart [place]  reviewed and appropriate. SOAPP-R completed on chart. The patient's medications are documented to the best of their ability. Quality of life and functional ability improved on medications. The patient is showing no aberrant behavior or evidence of diversion. The patient was advised not to use narcotic medication while operating an automobile or heavy machinery due to potential sedation or dizziness. The patient was educated to the risks associated with potential opioid dependence and addiction. Urine toxicology screens as per office protocol. Use of multimodal analgesia used prn. Follow up one month.

## 2024-03-22 NOTE — HISTORY OF PRESENT ILLNESS
[Neck] : neck [Gradual] : gradual [7] : 7 [Radiating] : radiating [Sharp] : sharp [Stabbing] : stabbing [Intermittent] : intermittent [Sleep] : sleep [Rest] : rest [Meds] : meds [Heat] : heat [Nothing helps with pain getting better] : Nothing helps with pain getting better [Sitting] : sitting [Standing] : standing [Bending forward] : bending forward [Walking] : walking [Full time] : Work status: full time [FreeTextEntry1] : States her neck pain varies. 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 Meds effective.  [] : This patient has had an injection before: no [FreeTextEntry6] : PUSHING PAIN , PULLING , SHOCKING PAIN WHEN TURNING HEAD  [FreeTextEntry7] : HEAD DOWN LEFT ARM LEFT HAND NUMBNESS - " FIRE HOT NUMBNESS "  [FreeTextEntry9] : LAYING DOWN  [de-identified] : CERTAIN POSITONS  [de-identified] : 01/10/2023 [de-identified] : 2023-03-01 [de-identified] : PT WENT TO PHYSICAL THERAPY FROM 2023-02-01 UNTIL 2023-03-01 GOING FOR 2X WEEK  as of 2024-03-22  PT IS CONTINUING WITH HOME EXERCISES /STRETCHED 5-7X / WEEK , HELPING BY STRENGTHENING  [de-identified] : mri 2023-07-08 cervical

## 2024-04-15 NOTE — BRIEF OPERATIVE NOTE - OPERATION/FINDINGS
Rx Refill Note  Requested Prescriptions     Pending Prescriptions Disp Refills    LORazepam (ATIVAN) 0.5 MG tablet [Pharmacy Med Name: LORazepam 0.5 MG Oral Tablet] 30 tablet 0     Sig: TAKE 1 TABLET BY MOUTH AT NIGHT AS NEEDED FOR ANXIETY      Last office visit with prescribing clinician: 1/16/2024   Last telemedicine visit with prescribing clinician: Visit date not found   Next office visit with prescribing clinician: Visit date not found                         Would you like a call back once the refill request has been completed: [] Yes [] No    If the office needs to give you a call back, can they leave a voicemail: [] Yes [] No    Bernardo Farias MA  04/15/24, 08:15 EDT   ACDF at C5/6 and C6/7 using 4web cervical truss system from [employer]. neuromonitoring stable throughout for motor - sensory and vocal cords

## 2024-04-19 ENCOUNTER — APPOINTMENT (OUTPATIENT)
Dept: PAIN MANAGEMENT | Facility: CLINIC | Age: 64
End: 2024-04-19
Payer: MEDICARE

## 2024-04-19 DIAGNOSIS — M54.2 CERVICALGIA: ICD-10-CM

## 2024-04-19 PROCEDURE — 99213 OFFICE O/P EST LOW 20 MIN: CPT

## 2024-05-13 ENCOUNTER — APPOINTMENT (OUTPATIENT)
Dept: UROGYNECOLOGY | Facility: CLINIC | Age: 64
End: 2024-05-13

## 2024-05-20 ENCOUNTER — APPOINTMENT (OUTPATIENT)
Dept: PAIN MANAGEMENT | Facility: CLINIC | Age: 64
End: 2024-05-20
Payer: MEDICARE

## 2024-05-20 DIAGNOSIS — M54.12 RADICULOPATHY, CERVICAL REGION: ICD-10-CM

## 2024-05-20 PROCEDURE — 99213 OFFICE O/P EST LOW 20 MIN: CPT

## 2024-05-20 RX ORDER — MORPHINE SULFATE 15 MG/1
15 TABLET, FILM COATED, EXTENDED RELEASE ORAL
Refills: 0 | Status: DISCONTINUED | COMMUNITY
End: 2024-05-20

## 2024-05-20 NOTE — HISTORY OF PRESENT ILLNESS
[Neck] : neck [Gradual] : gradual [8] : 8 [6] : 6 [Radiating] : radiating [Sharp] : sharp [Stabbing] : stabbing [Constant] : constant [Sleep] : sleep [Rest] : rest [Meds] : meds [Heat] : heat [Nothing helps with pain getting better] : Nothing helps with pain getting better [Sitting] : sitting [Standing] : standing [Walking] : walking [Bending forward] : bending forward [Full time] : Work status: full time [] : This patient has had an injection before: no [FreeTextEntry6] : LEFT HAND NUMBNESS , headaches , weight feeling in neck rt side  [FreeTextEntry7] : HEAD DOWN LEFT ARM  [FreeTextEntry9] : LAYING DOWN  [de-identified] : 01/10/2023 [de-identified] : 2023-03-01 [de-identified] : mri 2023-07-08 cervical  [de-identified] : PT WENT TO PHYSICAL THERAPY FROM 2023-02-01 UNTIL 2023-03-01 GOING FOR 2X WEEK  as of 2023-08-07 PT IS CONTINUING WITH HOME EXERCISES /STRETCHED 5-7X / WEEK , HELPING BY STRENGTHENING

## 2024-06-18 ENCOUNTER — APPOINTMENT (OUTPATIENT)
Dept: PAIN MANAGEMENT | Facility: CLINIC | Age: 64
End: 2024-06-18
Payer: MEDICARE

## 2024-06-18 DIAGNOSIS — M54.12 RADICULOPATHY, CERVICAL REGION: ICD-10-CM

## 2024-06-18 PROCEDURE — 99213 OFFICE O/P EST LOW 20 MIN: CPT

## 2024-06-18 RX ORDER — HYDROCODONE BITARTRATE AND ACETAMINOPHEN 10; 325 MG/1; MG/1
10-325 TABLET ORAL
Qty: 180 | Refills: 0 | Status: ACTIVE | COMMUNITY
Start: 2023-05-15 | End: 1900-01-01

## 2024-06-18 RX ORDER — MORPHINE SULFATE 15 MG/1
15 TABLET, FILM COATED, EXTENDED RELEASE ORAL
Qty: 30 | Refills: 0 | Status: ACTIVE | COMMUNITY
Start: 2023-06-13 | End: 1900-01-01

## 2024-06-18 NOTE — DISCUSSION/SUMMARY
[Medication Risks Reviewed] : Medication risks reviewed [de-identified] : Prescriptions renewed. Opioid agreement/obtained on chart [place]  reviewed and appropriate. SOAPP-R completed on chart. The patient's medications are documented to the best of their ability. Quality of life and functional ability improved on medications. The patient is showing no aberrant behavior or evidence of diversion. The patient was advised not to use narcotic medication while operating an automobile or heavy machinery due to potential sedation or dizziness. The patient was educated to the risks associated with potential opioid dependence and addiction. Urine toxicology screens as per office protocol. Use of multimodal analgesia used prn. Follow up one month.

## 2024-06-18 NOTE — HISTORY OF PRESENT ILLNESS
[Neck] : neck [Left Arm] : left arm [7] : 7 [Shooting] : shooting [Stabbing] : stabbing [Constant] : constant [Meds] : meds [Walking] : walking [Bending forward] : bending forward [Retired] : Work status: retired [FreeTextEntry1] : States chronic neck pain stable. Meds effective . Dealing with recent cardiac issues and presently on blood thinner.  Maintains a functional ADL. [] : This patient has had an injection before: no [FreeTextEntry9] : LAYING DOWN [de-identified] : LIFTING [de-identified] : 3 SURGERIES

## 2024-06-18 NOTE — REASON FOR VISIT
[Follow-Up Visit] : a follow-up pain management visit [Home] : at home, [unfilled] , at the time of the visit. [Medical Office: ([facility])___] : at the medical office located in  [Patient] : the patient [Self] : self

## 2024-06-20 ENCOUNTER — APPOINTMENT (OUTPATIENT)
Dept: VASCULAR SURGERY | Facility: CLINIC | Age: 64
End: 2024-06-20
Payer: MEDICARE

## 2024-06-20 VITALS — SYSTOLIC BLOOD PRESSURE: 102 MMHG | HEART RATE: 63 BPM | DIASTOLIC BLOOD PRESSURE: 68 MMHG

## 2024-06-20 VITALS — DIASTOLIC BLOOD PRESSURE: 64 MMHG | SYSTOLIC BLOOD PRESSURE: 86 MMHG | HEART RATE: 60 BPM

## 2024-06-20 PROCEDURE — 99204 OFFICE O/P NEW MOD 45 MIN: CPT

## 2024-06-20 NOTE — HISTORY OF PRESENT ILLNESS
[FreeTextEntry1] : 64-year-old female non-smoker with a history of hypertension, hyperlipidemia and severe cervical spinal stenosis presents to the office with a report of subclavian stenosis.  Patient states that she does not have any episodes of syncope.  She does have some pain in her upper extremities with numbness most likely due to the cervical pathology.  Patient has not had strokes or TIAs.  She presents for evaluation.

## 2024-06-20 NOTE — ASSESSMENT
[FreeTextEntry1] : 64-year-old female with history of hyperlipidemia, hypertension found to have a left subclavian artery stenosis.  Patient with a previous carotid duplex study which shows minimal carotid disease however she has retrograde flow in her left vertebral artery.  Patient does have a mild blood pressure discrepancy between the upper extremities.  The right upper extremity has what would be the normal blood pressure.  At this time no intervention is necessary.  Patient is essentially asymptomatic aside from the blood pressure discrepancy.  I have instructed the patient to always be cognizant of the accuracy of the blood pressures.  And the blood pressure should always be taken in the right upper extremity.  Additionally, she should remain on aspirin daily along with rosuvastatin daily.  Patient should have a yearly carotid duplex.  Patient may follow-up with me as needed. [Arterial/Venous Disease] : arterial/venous disease [Medication Management] : medication management

## 2024-06-20 NOTE — PHYSICAL EXAM
[JVD] : no jugular venous distention  [Normal Breath Sounds] : Normal breath sounds [Normal Rate and Rhythm] : normal rate and rhythm [1+] : left 1+ [2+] : left 2+ [Ankle Swelling (On Exam)] : not present [Varicose Veins Of Lower Extremities] : not present [] : not present [Abdomen Tenderness] : ~T ~M No abdominal tenderness [No Rash or Lesion] : No rash or lesion [Alert] : alert [Calm] : calm [de-identified] : Appears well

## 2024-06-23 ENCOUNTER — RX RENEWAL (OUTPATIENT)
Age: 64
End: 2024-06-23

## 2024-07-16 NOTE — PHYSICAL EXAM
[Chaperone Present] : A chaperone was present in the examining room during all aspects of the physical examination [Well developed] : well developed [Oriented x3] : oriented to person, place, and time [Labia Majora] : were normal [Normal Appearance] : general appearance was normal [Rectocele] : a rectocele [Cystocele] : a cystocele [Uterine Prolapse] : uterine prolapse [Aa ____] : Aa [unfilled] [Ba ____] : Ba [unfilled] [C ____] : C [unfilled] none [GH ____] : GH [unfilled] [TVL ____] : TVL  [unfilled] [Ap ____] : Ap [unfilled] [Normal] : normal [PB ____] : PB [unfilled] [Bp ____] : Bp [unfilled] [D ____] : D [unfilled] [No Acute Distress] : in acute distress [Tenderness] : ~T no ~M abdominal tenderness observed [Distended] : not distended [de-identified] : Patient removed her pessary this morning and I may not have seen the full extent of her prolapse. [de-identified] : urethral hypermobility

## 2024-07-17 ENCOUNTER — APPOINTMENT (OUTPATIENT)
Dept: PAIN MANAGEMENT | Facility: CLINIC | Age: 64
End: 2024-07-17
Payer: MEDICARE

## 2024-07-17 DIAGNOSIS — M54.12 RADICULOPATHY, CERVICAL REGION: ICD-10-CM

## 2024-07-17 PROCEDURE — 99213 OFFICE O/P EST LOW 20 MIN: CPT

## 2024-08-13 ENCOUNTER — APPOINTMENT (OUTPATIENT)
Dept: PAIN MANAGEMENT | Facility: CLINIC | Age: 64
End: 2024-08-13
Payer: MEDICARE

## 2024-08-13 VITALS — WEIGHT: 165 LBS | HEIGHT: 67 IN | BODY MASS INDEX: 25.9 KG/M2

## 2024-08-13 DIAGNOSIS — M54.12 RADICULOPATHY, CERVICAL REGION: ICD-10-CM

## 2024-08-13 DIAGNOSIS — M50.30 OTHER CERVICAL DISC DEGENERATION, UNSPECIFIED CERVICAL REGION: ICD-10-CM

## 2024-08-13 PROCEDURE — 99213 OFFICE O/P EST LOW 20 MIN: CPT

## 2024-08-13 NOTE — DISCUSSION/SUMMARY
[Medication Risks Reviewed] : Medication risks reviewed [de-identified] : Prescriptions renewed. Opioid agreement/obtained on chart [place]  reviewed and appropriate. SOAPP-R completed on chart. The patient's medications are documented to the best of their ability. Quality of life and functional ability improved on medications. The patient is showing no aberrant behavior or evidence of diversion. The patient was advised not to use narcotic medication while operating an automobile or heavy machinery due to potential sedation or dizziness. The patient was educated to the risks associated with potential opioid dependence and addiction. Urine toxicology screens as per office protocol. Use of multimodal analgesia used prn. Follow up one month.

## 2024-08-13 NOTE — HISTORY OF PRESENT ILLNESS
[Neck] : neck [Gradual] : gradual [8] : 8 [6] : 6 [Radiating] : radiating [Sharp] : sharp [Stabbing] : stabbing [Constant] : constant [Sleep] : sleep [Rest] : rest [Meds] : meds [Heat] : heat [Nothing helps with pain getting better] : Nothing helps with pain getting better [Sitting] : sitting [Standing] : standing [Walking] : walking [Bending forward] : bending forward [Full time] : Work status: full time [FreeTextEntry1] : States neck pain unchanged.  Maintains a functional ADL. Pain meds effective prn.  [] : This patient has had an injection before: no [FreeTextEntry6] : LEFT HAND NUMBNESS , headaches , weight feeling in neck rt side  [FreeTextEntry7] : HEAD DOWN LEFT ARM  [FreeTextEntry9] : LAYING DOWN  [de-identified] : 01/10/2023 [de-identified] : 2023-03-01 [de-identified] : mri 2023-07-08 cervical  [de-identified] : PT WENT TO PHYSICAL THERAPY FROM 2023-02-01 UNTIL 2023-03-01 GOING FOR 2X WEEK  as of 2023-08-07 PT IS CONTINUING WITH HOME EXERCISES /STRETCHED 5-7X / WEEK , HELPING BY STRENGTHENING

## 2024-09-11 ENCOUNTER — APPOINTMENT (OUTPATIENT)
Dept: PAIN MANAGEMENT | Facility: CLINIC | Age: 64
End: 2024-09-11
Payer: MEDICARE

## 2024-09-11 DIAGNOSIS — M43.22 FUSION OF SPINE, CERVICAL REGION: ICD-10-CM

## 2024-09-11 DIAGNOSIS — M54.12 RADICULOPATHY, CERVICAL REGION: ICD-10-CM

## 2024-09-11 PROCEDURE — 99212 OFFICE O/P EST SF 10 MIN: CPT

## 2024-09-11 NOTE — REASON FOR VISIT
[Home] : at home, [unfilled] , at the time of the visit. [Medical Office: ([facility])___] : at the medical office located in  [Patient] : the patient [Self] : self [Follow-Up Visit] : a follow-up pain management visit

## 2024-09-11 NOTE — HISTORY OF PRESENT ILLNESS
[Neck] : neck [Gradual] : gradual [8] : 8 [6] : 6 [Radiating] : radiating [Sharp] : sharp [Stabbing] : stabbing [Constant] : constant [Sleep] : sleep [Rest] : rest [Meds] : meds [Heat] : heat [Nothing helps with pain getting better] : Nothing helps with pain getting better [Sitting] : sitting [Standing] : standing [Walking] : walking [Bending forward] : bending forward [Full time] : Work status: full time [FreeTextEntry1] : States neck pain varies.  States she feels her neck. is locked and needs to crack to relieve. Using heat and hopes will resolve. Pain  meds effective.  Maintains a functional ADL. [] : This patient has had an injection before: no [FreeTextEntry6] : LEFT HAND NUMBNESS , headaches , weight feeling in neck rt side  [FreeTextEntry7] : HEAD DOWN LEFT ARM  [FreeTextEntry9] : LAYING DOWN  [de-identified] : 01/10/2023 [de-identified] : 2023-03-01 [de-identified] : mri 2023-07-08 cervical  [de-identified] : PT WENT TO PHYSICAL THERAPY FROM 2023-02-01 UNTIL 2023-03-01 GOING FOR 2X WEEK  as of 2023-08-07 PT IS CONTINUING WITH HOME EXERCISES /STRETCHED 5-7X / WEEK , HELPING BY STRENGTHENING

## 2024-09-11 NOTE — DISCUSSION/SUMMARY
[Medication Risks Reviewed] : Medication risks reviewed [de-identified] : Prescriptions renewed. Opioid agreement/obtained on chart [place]  reviewed and appropriate. SOAPP-R completed on chart. The patient's medications are documented to the best of their ability. Quality of life and functional ability improved on medications. The patient is showing no aberrant behavior or evidence of diversion. The patient was advised not to use narcotic medication while operating an automobile or heavy machinery due to potential sedation or dizziness. The patient was educated to the risks associated with potential opioid dependence and addiction. Urine toxicology screens as per office protocol. Use of multimodal analgesia used prn. Follow up one month.

## 2024-10-11 ENCOUNTER — APPOINTMENT (OUTPATIENT)
Dept: PAIN MANAGEMENT | Facility: CLINIC | Age: 64
End: 2024-10-11
Payer: MEDICARE

## 2024-10-11 DIAGNOSIS — M54.12 RADICULOPATHY, CERVICAL REGION: ICD-10-CM

## 2024-10-11 DIAGNOSIS — M54.2 CERVICALGIA: ICD-10-CM

## 2024-10-11 PROCEDURE — 99212 OFFICE O/P EST SF 10 MIN: CPT

## 2024-10-18 ENCOUNTER — APPOINTMENT (OUTPATIENT)
Dept: ORTHOPEDIC SURGERY | Facility: CLINIC | Age: 64
End: 2024-10-18

## 2024-10-18 VITALS — HEIGHT: 67 IN | BODY MASS INDEX: 25.11 KG/M2 | WEIGHT: 160 LBS

## 2024-10-18 PROBLEM — M17.12 PRIMARY OSTEOARTHRITIS OF LEFT KNEE: Status: ACTIVE | Noted: 2024-10-18

## 2024-10-18 PROCEDURE — 99214 OFFICE O/P EST MOD 30 MIN: CPT

## 2024-10-18 PROCEDURE — 73564 X-RAY EXAM KNEE 4 OR MORE: CPT | Mod: LT

## 2024-10-18 PROCEDURE — 99214 OFFICE O/P EST MOD 30 MIN: CPT | Mod: AS

## 2024-10-25 ENCOUNTER — APPOINTMENT (OUTPATIENT)
Dept: ORTHOPEDIC SURGERY | Facility: CLINIC | Age: 64
End: 2024-10-25
Payer: OTHER MISCELLANEOUS

## 2024-10-25 DIAGNOSIS — M17.12 UNILATERAL PRIMARY OSTEOARTHRITIS, LEFT KNEE: ICD-10-CM

## 2024-10-25 PROCEDURE — 99214 OFFICE O/P EST MOD 30 MIN: CPT | Mod: ACP,25

## 2024-10-25 PROCEDURE — J3490M: CUSTOM

## 2024-10-25 PROCEDURE — 20610 DRAIN/INJ JOINT/BURSA W/O US: CPT | Mod: LT

## 2024-11-08 ENCOUNTER — APPOINTMENT (OUTPATIENT)
Dept: ORTHOPEDIC SURGERY | Facility: CLINIC | Age: 64
End: 2024-11-08
Payer: OTHER MISCELLANEOUS

## 2024-11-08 ENCOUNTER — APPOINTMENT (OUTPATIENT)
Dept: PAIN MANAGEMENT | Facility: CLINIC | Age: 64
End: 2024-11-08
Payer: MEDICARE

## 2024-11-08 DIAGNOSIS — M54.12 RADICULOPATHY, CERVICAL REGION: ICD-10-CM

## 2024-11-08 PROCEDURE — 99214 OFFICE O/P EST MOD 30 MIN: CPT | Mod: 25

## 2024-11-08 PROCEDURE — 99212 OFFICE O/P EST SF 10 MIN: CPT

## 2024-11-08 PROCEDURE — 20610 DRAIN/INJ JOINT/BURSA W/O US: CPT | Mod: LT

## 2024-11-15 ENCOUNTER — APPOINTMENT (OUTPATIENT)
Dept: ORTHOPEDIC SURGERY | Facility: CLINIC | Age: 64
End: 2024-11-15
Payer: OTHER MISCELLANEOUS

## 2024-11-15 DIAGNOSIS — M17.12 UNILATERAL PRIMARY OSTEOARTHRITIS, LEFT KNEE: ICD-10-CM

## 2024-11-15 PROCEDURE — 99214 OFFICE O/P EST MOD 30 MIN: CPT | Mod: 25

## 2024-11-15 PROCEDURE — 20610 DRAIN/INJ JOINT/BURSA W/O US: CPT | Mod: LT

## 2024-12-02 ENCOUNTER — APPOINTMENT (OUTPATIENT)
Dept: PAIN MANAGEMENT | Facility: CLINIC | Age: 64
End: 2024-12-02
Payer: MEDICARE

## 2024-12-02 DIAGNOSIS — M54.12 RADICULOPATHY, CERVICAL REGION: ICD-10-CM

## 2024-12-02 PROCEDURE — 99212 OFFICE O/P EST SF 10 MIN: CPT

## 2024-12-03 ENCOUNTER — APPOINTMENT (OUTPATIENT)
Dept: PAIN MANAGEMENT | Facility: CLINIC | Age: 64
End: 2024-12-03

## 2024-12-31 ENCOUNTER — APPOINTMENT (OUTPATIENT)
Dept: PAIN MANAGEMENT | Facility: CLINIC | Age: 64
End: 2024-12-31
Payer: MEDICARE

## 2024-12-31 VITALS — WEIGHT: 166 LBS | HEIGHT: 67 IN | BODY MASS INDEX: 26.06 KG/M2

## 2024-12-31 DIAGNOSIS — M54.12 RADICULOPATHY, CERVICAL REGION: ICD-10-CM

## 2024-12-31 DIAGNOSIS — M43.22 FUSION OF SPINE, CERVICAL REGION: ICD-10-CM

## 2024-12-31 PROCEDURE — 99212 OFFICE O/P EST SF 10 MIN: CPT

## 2025-02-03 ENCOUNTER — APPOINTMENT (OUTPATIENT)
Dept: PAIN MANAGEMENT | Facility: CLINIC | Age: 65
End: 2025-02-03
Payer: MEDICARE

## 2025-02-03 DIAGNOSIS — M54.12 RADICULOPATHY, CERVICAL REGION: ICD-10-CM

## 2025-02-03 DIAGNOSIS — M50.90 CERVICAL DISC DISORDER, UNSPECIFIED, UNSPECIFIED CERVICAL REGION: ICD-10-CM

## 2025-02-03 DIAGNOSIS — G89.4 CHRONIC PAIN SYNDROME: ICD-10-CM

## 2025-02-03 PROCEDURE — 99213 OFFICE O/P EST LOW 20 MIN: CPT | Mod: 93

## 2025-03-03 ENCOUNTER — APPOINTMENT (OUTPATIENT)
Dept: PAIN MANAGEMENT | Facility: CLINIC | Age: 65
End: 2025-03-03
Payer: MEDICARE

## 2025-03-03 DIAGNOSIS — M54.12 RADICULOPATHY, CERVICAL REGION: ICD-10-CM

## 2025-03-03 DIAGNOSIS — Z98.1 ARTHRODESIS STATUS: ICD-10-CM

## 2025-03-03 PROCEDURE — 99212 OFFICE O/P EST SF 10 MIN: CPT | Mod: 2W

## 2025-03-06 ENCOUNTER — RX RENEWAL (OUTPATIENT)
Age: 65
End: 2025-03-06

## 2025-04-04 ENCOUNTER — APPOINTMENT (OUTPATIENT)
Dept: PAIN MANAGEMENT | Facility: CLINIC | Age: 65
End: 2025-04-04
Payer: MEDICARE

## 2025-04-04 DIAGNOSIS — Z98.1 ARTHRODESIS STATUS: ICD-10-CM

## 2025-04-04 DIAGNOSIS — M54.2 CERVICALGIA: ICD-10-CM

## 2025-04-04 PROCEDURE — 99213 OFFICE O/P EST LOW 20 MIN: CPT | Mod: 93

## 2025-04-29 ENCOUNTER — APPOINTMENT (OUTPATIENT)
Dept: PAIN MANAGEMENT | Facility: CLINIC | Age: 65
End: 2025-04-29
Payer: MEDICARE

## 2025-04-29 VITALS — HEIGHT: 67 IN | WEIGHT: 167 LBS | BODY MASS INDEX: 26.21 KG/M2

## 2025-04-29 DIAGNOSIS — M54.12 RADICULOPATHY, CERVICAL REGION: ICD-10-CM

## 2025-04-29 PROCEDURE — 99212 OFFICE O/P EST SF 10 MIN: CPT

## 2025-05-08 NOTE — H&P PST ADULT - NSICDXPASTSURGICALHX_GEN_ALL_CORE_FT
[facility] PAST SURGICAL HISTORY:  H/O cervical discectomy     H/O foot surgery b/l    H/O shoulder surgery right    History of elbow surgery     Neck pain with history of cervical spinal surgery x2    Pituitary mass 30 y ears ago    S/P breast biopsy     S/P cervical spinal fusion     S/P colonoscopy     S/P rotator cuff repair     S/P tonsillectomy

## 2025-05-29 ENCOUNTER — NON-APPOINTMENT (OUTPATIENT)
Age: 65
End: 2025-05-29

## 2025-06-02 ENCOUNTER — APPOINTMENT (OUTPATIENT)
Dept: PAIN MANAGEMENT | Facility: CLINIC | Age: 65
End: 2025-06-02
Payer: MEDICARE

## 2025-06-02 DIAGNOSIS — Z98.1 ARTHRODESIS STATUS: ICD-10-CM

## 2025-06-02 DIAGNOSIS — M54.12 RADICULOPATHY, CERVICAL REGION: ICD-10-CM

## 2025-06-02 PROCEDURE — 99212 OFFICE O/P EST SF 10 MIN: CPT | Mod: 2W

## 2025-06-12 ENCOUNTER — TRANSCRIPTION ENCOUNTER (OUTPATIENT)
Age: 65
End: 2025-06-12

## 2025-06-12 ENCOUNTER — OUTPATIENT (OUTPATIENT)
Dept: OUTPATIENT SERVICES | Facility: HOSPITAL | Age: 65
LOS: 1 days | End: 2025-06-12
Payer: MEDICARE

## 2025-06-12 VITALS
OXYGEN SATURATION: 97 % | SYSTOLIC BLOOD PRESSURE: 132 MMHG | HEART RATE: 70 BPM | DIASTOLIC BLOOD PRESSURE: 58 MMHG | RESPIRATION RATE: 16 BRPM

## 2025-06-12 VITALS
HEIGHT: 67 IN | DIASTOLIC BLOOD PRESSURE: 77 MMHG | WEIGHT: 171.96 LBS | SYSTOLIC BLOOD PRESSURE: 122 MMHG | RESPIRATION RATE: 18 BRPM | HEART RATE: 73 BPM | TEMPERATURE: 98 F | OXYGEN SATURATION: 100 %

## 2025-06-12 DIAGNOSIS — Z98.890 OTHER SPECIFIED POSTPROCEDURAL STATES: Chronic | ICD-10-CM

## 2025-06-12 DIAGNOSIS — R19.5 OTHER FECAL ABNORMALITIES: ICD-10-CM

## 2025-06-12 DIAGNOSIS — Z90.89 ACQUIRED ABSENCE OF OTHER ORGANS: Chronic | ICD-10-CM

## 2025-06-12 DIAGNOSIS — Z01.818 ENCOUNTER FOR OTHER PREPROCEDURAL EXAMINATION: ICD-10-CM

## 2025-06-12 DIAGNOSIS — M54.2 CERVICALGIA: Chronic | ICD-10-CM

## 2025-06-12 DIAGNOSIS — Z98.1 ARTHRODESIS STATUS: Chronic | ICD-10-CM

## 2025-06-12 DIAGNOSIS — E23.6 OTHER DISORDERS OF PITUITARY GLAND: Chronic | ICD-10-CM

## 2025-06-12 PROCEDURE — 88305 TISSUE EXAM BY PATHOLOGIST: CPT

## 2025-06-12 PROCEDURE — 45380 COLONOSCOPY AND BIOPSY: CPT | Mod: XS,PT

## 2025-06-12 PROCEDURE — 45385 COLONOSCOPY W/LESION REMOVAL: CPT | Mod: PT

## 2025-06-12 PROCEDURE — 88305 TISSUE EXAM BY PATHOLOGIST: CPT | Mod: 26

## 2025-06-12 DEVICE — NET RETRV ROT ROTH 2.5MMX230CM: Type: IMPLANTABLE DEVICE | Status: FUNCTIONAL

## 2025-06-12 RX ORDER — ROSUVASTATIN CALCIUM 20 MG/1
1 TABLET, FILM COATED ORAL
Refills: 0 | DISCHARGE

## 2025-06-12 RX ORDER — METOPROLOL SUCCINATE 50 MG/1
1 TABLET, EXTENDED RELEASE ORAL
Refills: 0 | DISCHARGE

## 2025-06-12 NOTE — ASU PATIENT PROFILE, ADULT - FALL HARM RISK - HARM RISK INTERVENTIONS

## 2025-06-12 NOTE — ASU PATIENT PROFILE, ADULT - NSICDXPASTSURGICALHX_GEN_ALL_CORE_FT
PAST SURGICAL HISTORY:  H/O cervical discectomy     H/O foot surgery b/l    H/O shoulder surgery right    History of elbow surgery     Neck pain with history of cervical spinal surgery x2    Pituitary mass 30 y ears ago    S/P breast biopsy     S/P cervical spinal fusion     S/P colonoscopy     S/P rotator cuff repair     S/P tonsillectomy

## 2025-06-12 NOTE — PRE PROCEDURE NOTE - PRE PROCEDURE EVALUATION
Attending Physician:  Sammy    Procedure: colonoscopy    Indication for Procedure: + cologuard  ________________________________________________________  PAST MEDICAL & SURGICAL HISTORY:  Chronic neck pain      HTN (hypertension)      HLD (hyperlipidemia)      Disc disorder of cervical region      Cervical neck pain with evidence of disc disease      Cervical radiculopathy      Uterine prolapse      Presence of pessary      OA (osteoarthritis)      Neck pain with history of cervical spinal surgery  x2      Pituitary mass  30 y ears ago      H/O foot surgery  b/l      H/O shoulder surgery  right      S/P cervical spinal fusion      History of elbow surgery      S/P rotator cuff repair      S/P tonsillectomy      S/P breast biopsy      S/P colonoscopy      H/O cervical discectomy        ALLERGIES:  No Known Allergies    HOME MEDICATIONS:  acetaminophen 325 mg oral tablet: 3 tab(s) orally every 6 hours  ashwaganda: orally once a day  benazepril-hydrochlorothiazide 20 mg-12.5 mg oral tablet: 1 tab(s) orally once a day  black elderberry: orally once a day  gabapentin 300 mg oral capsule: 1 cap(s) orally 3 times a day  gabapentin 800 mg oral tablet: 1 tab(s) orally 3 times a day  ibuprofen 600 mg oral tablet: 1 tab(s) orally every 6 hours  Lyrica 50 mg oral capsule: 1 cap(s) orally 2 times a day  Multiple Vitamins oral tablet: 1 tab(s) orally once a day  rosuvastatin 20 mg oral capsule: 1 cap(s) orally once a day    PHYSICAL EXAMINATION:    Constitutional: NAD  HEENT: Anicteric, EOMI   Neck:  No JVD  Respiratory: Normal respiratory effort, no accessory muscle use  Cardiovascular: S1 and S2, normal rate  Gastrointestinal: BS+, soft, NT/ND  Extremities: No peripheral edema  Neurological: A/O x 3, no focal deficits  Psychiatric: Normal mood, normal affect  Skin: No rashes on exposed skin    COMMENTS:    The patient is a suitable candidate for the planned procedure unless box checked [ ]  No, explain:

## 2025-06-12 NOTE — ASU DISCHARGE PLAN (ADULT/PEDIATRIC) - FINANCIAL ASSISTANCE
[facility] provides services at a reduced cost to those who are determined to be eligible through [facility]’s financial assistance program. Information regarding [facility]’s financial assistance program can be found by going to [email or web address] or by calling [phone].

## 2025-06-17 LAB — SURGICAL PATHOLOGY STUDY: SIGNIFICANT CHANGE UP

## 2025-06-30 ENCOUNTER — APPOINTMENT (OUTPATIENT)
Dept: PAIN MANAGEMENT | Facility: CLINIC | Age: 65
End: 2025-06-30
Payer: MEDICARE

## 2025-06-30 PROCEDURE — 99212 OFFICE O/P EST SF 10 MIN: CPT | Mod: 93

## 2025-07-22 ENCOUNTER — INPATIENT (INPATIENT)
Facility: HOSPITAL | Age: 65
LOS: 1 days | Discharge: HOME | DRG: 442 | End: 2025-07-24
Attending: STUDENT IN AN ORGANIZED HEALTH CARE EDUCATION/TRAINING PROGRAM | Admitting: HOSPITALIST
Payer: MEDICARE

## 2025-07-22 VITALS
OXYGEN SATURATION: 98 % | WEIGHT: 184.09 LBS | DIASTOLIC BLOOD PRESSURE: 54 MMHG | HEART RATE: 89 BPM | HEIGHT: 67 IN | TEMPERATURE: 98 F | SYSTOLIC BLOOD PRESSURE: 79 MMHG | RESPIRATION RATE: 20 BRPM

## 2025-07-22 DIAGNOSIS — M54.2 CERVICALGIA: Chronic | ICD-10-CM

## 2025-07-22 DIAGNOSIS — N12 TUBULO-INTERSTITIAL NEPHRITIS, NOT SPECIFIED AS ACUTE OR CHRONIC: ICD-10-CM

## 2025-07-22 DIAGNOSIS — Z98.890 OTHER SPECIFIED POSTPROCEDURAL STATES: Chronic | ICD-10-CM

## 2025-07-22 DIAGNOSIS — N17.9 ACUTE KIDNEY FAILURE, UNSPECIFIED: ICD-10-CM

## 2025-07-22 DIAGNOSIS — N39.0 URINARY TRACT INFECTION, SITE NOT SPECIFIED: ICD-10-CM

## 2025-07-22 DIAGNOSIS — R74.01 ELEVATION OF LEVELS OF LIVER TRANSAMINASE LEVELS: ICD-10-CM

## 2025-07-22 DIAGNOSIS — Z98.1 ARTHRODESIS STATUS: Chronic | ICD-10-CM

## 2025-07-22 DIAGNOSIS — Z29.9 ENCOUNTER FOR PROPHYLACTIC MEASURES, UNSPECIFIED: ICD-10-CM

## 2025-07-22 DIAGNOSIS — I10 ESSENTIAL (PRIMARY) HYPERTENSION: ICD-10-CM

## 2025-07-22 DIAGNOSIS — E87.1 HYPO-OSMOLALITY AND HYPONATREMIA: ICD-10-CM

## 2025-07-22 DIAGNOSIS — M54.2 CERVICALGIA: ICD-10-CM

## 2025-07-22 DIAGNOSIS — E23.6 OTHER DISORDERS OF PITUITARY GLAND: Chronic | ICD-10-CM

## 2025-07-22 DIAGNOSIS — E78.5 HYPERLIPIDEMIA, UNSPECIFIED: ICD-10-CM

## 2025-07-22 DIAGNOSIS — Z90.89 ACQUIRED ABSENCE OF OTHER ORGANS: Chronic | ICD-10-CM

## 2025-07-22 LAB
ALBUMIN SERPL ELPH-MCNC: 4.2 G/DL — SIGNIFICANT CHANGE UP (ref 3.3–5)
ALP SERPL-CCNC: 234 U/L — HIGH (ref 40–120)
ALT FLD-CCNC: 244 U/L — HIGH (ref 10–45)
ANION GAP SERPL CALC-SCNC: 15 MMOL/L — SIGNIFICANT CHANGE UP (ref 5–17)
ANION GAP SERPL CALC-SCNC: 15 MMOL/L — SIGNIFICANT CHANGE UP (ref 5–17)
APPEARANCE UR: ABNORMAL
APTT BLD: 34.6 SEC — SIGNIFICANT CHANGE UP (ref 26.1–36.8)
AST SERPL-CCNC: 142 U/L — HIGH (ref 10–40)
BACTERIA # UR AUTO: ABNORMAL /HPF
BASOPHILS # BLD AUTO: 0.04 K/UL — SIGNIFICANT CHANGE UP (ref 0–0.2)
BASOPHILS NFR BLD AUTO: 0.8 % — SIGNIFICANT CHANGE UP (ref 0–2)
BILIRUB SERPL-MCNC: 0.7 MG/DL — SIGNIFICANT CHANGE UP (ref 0.2–1.2)
BILIRUB UR-MCNC: NEGATIVE — SIGNIFICANT CHANGE UP
BUN SERPL-MCNC: 32 MG/DL — HIGH (ref 7–23)
BUN SERPL-MCNC: 33 MG/DL — HIGH (ref 7–23)
CALCIUM SERPL-MCNC: 10 MG/DL — SIGNIFICANT CHANGE UP (ref 8.4–10.5)
CALCIUM SERPL-MCNC: 10 MG/DL — SIGNIFICANT CHANGE UP (ref 8.4–10.5)
CAST: 10 /LPF — HIGH (ref 0–4)
CHLORIDE SERPL-SCNC: 95 MMOL/L — LOW (ref 96–108)
CHLORIDE SERPL-SCNC: 98 MMOL/L — SIGNIFICANT CHANGE UP (ref 96–108)
CO2 SERPL-SCNC: 19 MMOL/L — LOW (ref 22–31)
CO2 SERPL-SCNC: 20 MMOL/L — LOW (ref 22–31)
COLOR SPEC: YELLOW — SIGNIFICANT CHANGE UP
CREAT SERPL-MCNC: 2.34 MG/DL — HIGH (ref 0.5–1.3)
CREAT SERPL-MCNC: 2.41 MG/DL — HIGH (ref 0.5–1.3)
DIFF PNL FLD: ABNORMAL
EGFR: 21 ML/MIN/1.73M2 — LOW
EGFR: 21 ML/MIN/1.73M2 — LOW
EGFR: 22 ML/MIN/1.73M2 — LOW
EGFR: 22 ML/MIN/1.73M2 — LOW
EOSINOPHIL # BLD AUTO: 0.21 K/UL — SIGNIFICANT CHANGE UP (ref 0–0.5)
EOSINOPHIL NFR BLD AUTO: 4.4 % — SIGNIFICANT CHANGE UP (ref 0–6)
ETHANOL SERPL-MCNC: <10 MG/DL — SIGNIFICANT CHANGE UP (ref 0–10)
FLUAV AG NPH QL: SIGNIFICANT CHANGE UP
FLUBV AG NPH QL: SIGNIFICANT CHANGE UP
GAS PNL BLDV: SIGNIFICANT CHANGE UP
GLUCOSE SERPL-MCNC: 98 MG/DL — SIGNIFICANT CHANGE UP (ref 70–99)
GLUCOSE SERPL-MCNC: 98 MG/DL — SIGNIFICANT CHANGE UP (ref 70–99)
GLUCOSE UR QL: NEGATIVE MG/DL — SIGNIFICANT CHANGE UP
HCG SERPL-ACNC: <2 MIU/ML — SIGNIFICANT CHANGE UP
HCT VFR BLD CALC: 32.3 % — LOW (ref 34.5–45)
HGB BLD-MCNC: 10.9 G/DL — LOW (ref 11.5–15.5)
IMM GRANULOCYTES # BLD AUTO: 0.09 K/UL — HIGH (ref 0–0.07)
IMM GRANULOCYTES NFR BLD AUTO: 1.9 % — HIGH (ref 0–0.9)
INR BLD: 0.91 RATIO — SIGNIFICANT CHANGE UP (ref 0.85–1.16)
KETONES UR QL: NEGATIVE MG/DL — SIGNIFICANT CHANGE UP
LACTATE SERPL-SCNC: 0.6 MMOL/L — SIGNIFICANT CHANGE UP (ref 0.5–2)
LEUKOCYTE ESTERASE UR-ACNC: ABNORMAL
LYMPHOCYTES # BLD AUTO: 0.85 K/UL — LOW (ref 1–3.3)
LYMPHOCYTES NFR BLD AUTO: 17.9 % — SIGNIFICANT CHANGE UP (ref 13–44)
MCHC RBC-ENTMCNC: 32.3 PG — SIGNIFICANT CHANGE UP (ref 27–34)
MCHC RBC-ENTMCNC: 33.7 G/DL — SIGNIFICANT CHANGE UP (ref 32–36)
MCV RBC AUTO: 95.8 FL — SIGNIFICANT CHANGE UP (ref 80–100)
MONOCYTES # BLD AUTO: 0.61 K/UL — SIGNIFICANT CHANGE UP (ref 0–0.9)
MONOCYTES NFR BLD AUTO: 12.8 % — SIGNIFICANT CHANGE UP (ref 2–14)
NEUTROPHILS # BLD AUTO: 2.96 K/UL — SIGNIFICANT CHANGE UP (ref 1.8–7.4)
NEUTROPHILS NFR BLD AUTO: 62.2 % — SIGNIFICANT CHANGE UP (ref 43–77)
NITRITE UR-MCNC: NEGATIVE — SIGNIFICANT CHANGE UP
NRBC # BLD AUTO: 0 K/UL — SIGNIFICANT CHANGE UP (ref 0–0)
NRBC # FLD: 0 K/UL — SIGNIFICANT CHANGE UP (ref 0–0)
NRBC BLD AUTO-RTO: 0 /100 WBCS — SIGNIFICANT CHANGE UP (ref 0–0)
PH UR: 5 — SIGNIFICANT CHANGE UP (ref 5–8)
PLATELET # BLD AUTO: 292 K/UL — SIGNIFICANT CHANGE UP (ref 150–400)
PMV BLD: 9.4 FL — SIGNIFICANT CHANGE UP (ref 7–13)
POTASSIUM SERPL-MCNC: 3.6 MMOL/L — SIGNIFICANT CHANGE UP (ref 3.5–5.3)
POTASSIUM SERPL-MCNC: 5.8 MMOL/L — HIGH (ref 3.5–5.3)
POTASSIUM SERPL-SCNC: 3.6 MMOL/L — SIGNIFICANT CHANGE UP (ref 3.5–5.3)
POTASSIUM SERPL-SCNC: 5.8 MMOL/L — HIGH (ref 3.5–5.3)
PROT SERPL-MCNC: 8.5 G/DL — HIGH (ref 6–8.3)
PROT UR-MCNC: SIGNIFICANT CHANGE UP MG/DL
PROTHROM AB SERPL-ACNC: 10.4 SEC — SIGNIFICANT CHANGE UP (ref 9.9–13.4)
RBC # BLD: 3.37 M/UL — LOW (ref 3.8–5.2)
RBC # FLD: 13.4 % — SIGNIFICANT CHANGE UP (ref 10.3–14.5)
RBC CASTS # UR COMP ASSIST: 0 /HPF — SIGNIFICANT CHANGE UP (ref 0–4)
REVIEW: SIGNIFICANT CHANGE UP
RSV RNA NPH QL NAA+NON-PROBE: SIGNIFICANT CHANGE UP
SARS-COV-2 RNA SPEC QL NAA+PROBE: SIGNIFICANT CHANGE UP
SODIUM SERPL-SCNC: 129 MMOL/L — LOW (ref 135–145)
SODIUM SERPL-SCNC: 133 MMOL/L — LOW (ref 135–145)
SOURCE RESPIRATORY: SIGNIFICANT CHANGE UP
SP GR SPEC: 1.01 — SIGNIFICANT CHANGE UP (ref 1–1.03)
SQUAMOUS # UR AUTO: >36 /HPF — HIGH (ref 0–5)
UROBILINOGEN FLD QL: 0.2 MG/DL — SIGNIFICANT CHANGE UP (ref 0.2–1)
WBC # BLD: 4.76 K/UL — SIGNIFICANT CHANGE UP (ref 3.8–10.5)
WBC # FLD AUTO: 4.76 K/UL — SIGNIFICANT CHANGE UP (ref 3.8–10.5)
WBC UR QL: 300 /HPF — HIGH (ref 0–5)

## 2025-07-22 PROCEDURE — 85610 PROTHROMBIN TIME: CPT

## 2025-07-22 PROCEDURE — 80053 COMPREHEN METABOLIC PANEL: CPT

## 2025-07-22 PROCEDURE — 74176 CT ABD & PELVIS W/O CONTRAST: CPT

## 2025-07-22 PROCEDURE — 80048 BASIC METABOLIC PNL TOTAL CA: CPT

## 2025-07-22 PROCEDURE — 85025 COMPLETE CBC W/AUTO DIFF WBC: CPT

## 2025-07-22 PROCEDURE — 93010 ELECTROCARDIOGRAM REPORT: CPT

## 2025-07-22 PROCEDURE — 82803 BLOOD GASES ANY COMBINATION: CPT

## 2025-07-22 PROCEDURE — 71045 X-RAY EXAM CHEST 1 VIEW: CPT | Mod: 26

## 2025-07-22 PROCEDURE — 99291 CRITICAL CARE FIRST HOUR: CPT

## 2025-07-22 PROCEDURE — 84702 CHORIONIC GONADOTROPIN TEST: CPT

## 2025-07-22 PROCEDURE — 81001 URINALYSIS AUTO W/SCOPE: CPT

## 2025-07-22 PROCEDURE — 82435 ASSAY OF BLOOD CHLORIDE: CPT

## 2025-07-22 PROCEDURE — 99223 1ST HOSP IP/OBS HIGH 75: CPT

## 2025-07-22 PROCEDURE — 74176 CT ABD & PELVIS W/O CONTRAST: CPT | Mod: 26

## 2025-07-22 PROCEDURE — 80307 DRUG TEST PRSMV CHEM ANLYZR: CPT

## 2025-07-22 PROCEDURE — 85730 THROMBOPLASTIN TIME PARTIAL: CPT

## 2025-07-22 PROCEDURE — 36415 COLL VENOUS BLD VENIPUNCTURE: CPT

## 2025-07-22 PROCEDURE — 83605 ASSAY OF LACTIC ACID: CPT

## 2025-07-22 PROCEDURE — 71045 X-RAY EXAM CHEST 1 VIEW: CPT

## 2025-07-22 PROCEDURE — 82330 ASSAY OF CALCIUM: CPT

## 2025-07-22 PROCEDURE — 84132 ASSAY OF SERUM POTASSIUM: CPT

## 2025-07-22 PROCEDURE — 85014 HEMATOCRIT: CPT

## 2025-07-22 PROCEDURE — 84295 ASSAY OF SERUM SODIUM: CPT

## 2025-07-22 PROCEDURE — 82947 ASSAY GLUCOSE BLOOD QUANT: CPT

## 2025-07-22 PROCEDURE — 87637 SARSCOV2&INF A&B&RSV AMP PRB: CPT

## 2025-07-22 PROCEDURE — 85018 HEMOGLOBIN: CPT

## 2025-07-22 RX ORDER — METOPROLOL SUCCINATE 50 MG/1
1 TABLET, EXTENDED RELEASE ORAL
Refills: 0 | DISCHARGE

## 2025-07-22 RX ORDER — ACETAMINOPHEN 500 MG/5ML
650 LIQUID (ML) ORAL EVERY 6 HOURS
Refills: 0 | Status: DISCONTINUED | OUTPATIENT
Start: 2025-07-22 | End: 2025-07-23

## 2025-07-22 RX ORDER — GABAPENTIN 400 MG/1
600 CAPSULE ORAL THREE TIMES A DAY
Refills: 0 | Status: DISCONTINUED | OUTPATIENT
Start: 2025-07-22 | End: 2025-07-24

## 2025-07-22 RX ORDER — METOPROLOL SUCCINATE 50 MG/1
50 TABLET, EXTENDED RELEASE ORAL DAILY
Refills: 0 | Status: DISCONTINUED | OUTPATIENT
Start: 2025-07-22 | End: 2025-07-24

## 2025-07-22 RX ORDER — ASPIRIN 325 MG
1 TABLET ORAL
Refills: 0 | DISCHARGE

## 2025-07-22 RX ORDER — ASPIRIN 325 MG
81 TABLET ORAL DAILY
Refills: 0 | Status: DISCONTINUED | OUTPATIENT
Start: 2025-07-22 | End: 2025-07-24

## 2025-07-22 RX ORDER — CEFTRIAXONE 500 MG/1
1000 INJECTION, POWDER, FOR SOLUTION INTRAMUSCULAR; INTRAVENOUS EVERY 24 HOURS
Refills: 0 | Status: DISCONTINUED | OUTPATIENT
Start: 2025-07-22 | End: 2025-07-24

## 2025-07-22 RX ORDER — PIPERACILLIN-TAZO-DEXTROSE,ISO 3.375G/5
3.38 IV SOLUTION, PIGGYBACK PREMIX FROZEN(ML) INTRAVENOUS ONCE
Refills: 0 | Status: COMPLETED | OUTPATIENT
Start: 2025-07-22 | End: 2025-07-22

## 2025-07-22 RX ORDER — HYDROCODONE BITARTRATE AND ACETAMINOPHEN 5; 325 MG/1; MG/1
1 TABLET ORAL
Refills: 0 | DISCHARGE

## 2025-07-22 RX ORDER — PREDNISONE 20 MG/1
1 TABLET ORAL
Refills: 0 | DISCHARGE

## 2025-07-22 RX ORDER — MELATONIN 5 MG
3 TABLET ORAL AT BEDTIME
Refills: 0 | Status: DISCONTINUED | OUTPATIENT
Start: 2025-07-22 | End: 2025-07-24

## 2025-07-22 RX ORDER — MAGNESIUM, ALUMINUM HYDROXIDE 200-200 MG
30 TABLET,CHEWABLE ORAL EVERY 4 HOURS
Refills: 0 | Status: DISCONTINUED | OUTPATIENT
Start: 2025-07-22 | End: 2025-07-24

## 2025-07-22 RX ORDER — ONDANSETRON HCL/PF 4 MG/2 ML
4 VIAL (ML) INJECTION EVERY 8 HOURS
Refills: 0 | Status: DISCONTINUED | OUTPATIENT
Start: 2025-07-22 | End: 2025-07-24

## 2025-07-22 RX ADMIN — Medication 200 GRAM(S): at 19:18

## 2025-07-22 RX ADMIN — Medication 1000 MILLILITER(S): at 18:48

## 2025-07-22 RX ADMIN — Medication 1000 MILLILITER(S): at 20:49

## 2025-07-23 LAB
ADD ON TEST-SPECIMEN IN LAB: SIGNIFICANT CHANGE UP
ALBUMIN SERPL ELPH-MCNC: 4 G/DL — SIGNIFICANT CHANGE UP (ref 3.3–5)
ALP SERPL-CCNC: 202 U/L — HIGH (ref 40–120)
ALT FLD-CCNC: 175 U/L — HIGH (ref 10–45)
ANION GAP SERPL CALC-SCNC: 17 MMOL/L — SIGNIFICANT CHANGE UP (ref 5–17)
AST SERPL-CCNC: 66 U/L — HIGH (ref 10–40)
BILIRUB SERPL-MCNC: 0.4 MG/DL — SIGNIFICANT CHANGE UP (ref 0.2–1.2)
BUN SERPL-MCNC: 29 MG/DL — HIGH (ref 7–23)
CALCIUM SERPL-MCNC: 9.6 MG/DL — SIGNIFICANT CHANGE UP (ref 8.4–10.5)
CHLORIDE SERPL-SCNC: 102 MMOL/L — SIGNIFICANT CHANGE UP (ref 96–108)
CO2 SERPL-SCNC: 18 MMOL/L — LOW (ref 22–31)
CREAT SERPL-MCNC: 1.62 MG/DL — HIGH (ref 0.5–1.3)
EGFR: 35 ML/MIN/1.73M2 — LOW
EGFR: 35 ML/MIN/1.73M2 — LOW
GLUCOSE SERPL-MCNC: 99 MG/DL — SIGNIFICANT CHANGE UP (ref 70–99)
HAV IGM SER-ACNC: SIGNIFICANT CHANGE UP
HAV IGM SER-ACNC: SIGNIFICANT CHANGE UP
HBV CORE IGM SER-ACNC: SIGNIFICANT CHANGE UP
HBV CORE IGM SER-ACNC: SIGNIFICANT CHANGE UP
HBV SURFACE AG SER-ACNC: SIGNIFICANT CHANGE UP
HBV SURFACE AG SER-ACNC: SIGNIFICANT CHANGE UP
HCT VFR BLD CALC: 30 % — LOW (ref 34.5–45)
HCV AB S/CO SERPL IA: 0.11 S/CO — SIGNIFICANT CHANGE UP (ref 0–0.79)
HCV AB S/CO SERPL IA: 0.12 S/CO — SIGNIFICANT CHANGE UP (ref 0–0.79)
HCV AB SERPL-IMP: SIGNIFICANT CHANGE UP
HCV AB SERPL-IMP: SIGNIFICANT CHANGE UP
HGB BLD-MCNC: 9.7 G/DL — LOW (ref 11.5–15.5)
HIV 1+2 AB+HIV1 P24 AG SERPL QL IA: SIGNIFICANT CHANGE UP
IGG FLD-MCNC: 858 MG/DL — SIGNIFICANT CHANGE UP (ref 610–1660)
IGG1 SER-MCNC: 541 MG/DL — SIGNIFICANT CHANGE UP (ref 240–1118)
IGG2 SER-MCNC: 240 MG/DL — SIGNIFICANT CHANGE UP (ref 124–549)
IGG3 SER-MCNC: 36.2 MG/DL — SIGNIFICANT CHANGE UP (ref 15–102)
IGG4 SER-MCNC: 18.6 MG/DL — SIGNIFICANT CHANGE UP (ref 1–123)
MAGNESIUM SERPL-MCNC: 2.4 MG/DL — SIGNIFICANT CHANGE UP (ref 1.6–2.6)
MCHC RBC-ENTMCNC: 30.9 PG — SIGNIFICANT CHANGE UP (ref 27–34)
MCHC RBC-ENTMCNC: 32.3 G/DL — SIGNIFICANT CHANGE UP (ref 32–36)
MCV RBC AUTO: 95.5 FL — SIGNIFICANT CHANGE UP (ref 80–100)
NRBC # BLD AUTO: 0 K/UL — SIGNIFICANT CHANGE UP (ref 0–0)
NRBC # FLD: 0 K/UL — SIGNIFICANT CHANGE UP (ref 0–0)
NRBC BLD AUTO-RTO: 0 /100 WBCS — SIGNIFICANT CHANGE UP (ref 0–0)
PHOSPHATE SERPL-MCNC: 4.3 MG/DL — SIGNIFICANT CHANGE UP (ref 2.5–4.5)
PLATELET # BLD AUTO: 260 K/UL — SIGNIFICANT CHANGE UP (ref 150–400)
PMV BLD: 8.9 FL — SIGNIFICANT CHANGE UP (ref 7–13)
POTASSIUM SERPL-MCNC: 3.7 MMOL/L — SIGNIFICANT CHANGE UP (ref 3.5–5.3)
POTASSIUM SERPL-SCNC: 3.7 MMOL/L — SIGNIFICANT CHANGE UP (ref 3.5–5.3)
PROT SERPL-MCNC: 7.4 G/DL — SIGNIFICANT CHANGE UP (ref 6–8.3)
RBC # BLD: 3.14 M/UL — LOW (ref 3.8–5.2)
RBC # FLD: 13.5 % — SIGNIFICANT CHANGE UP (ref 10.3–14.5)
SODIUM SERPL-SCNC: 137 MMOL/L — SIGNIFICANT CHANGE UP (ref 135–145)
WBC # BLD: 4.07 K/UL — SIGNIFICANT CHANGE UP (ref 3.8–10.5)
WBC # FLD AUTO: 4.07 K/UL — SIGNIFICANT CHANGE UP (ref 3.8–10.5)

## 2025-07-23 PROCEDURE — 86255 FLUORESCENT ANTIBODY SCREEN: CPT

## 2025-07-23 PROCEDURE — 82435 ASSAY OF BLOOD CHLORIDE: CPT

## 2025-07-23 PROCEDURE — 86790 VIRUS ANTIBODY NOS: CPT

## 2025-07-23 PROCEDURE — 82947 ASSAY GLUCOSE BLOOD QUANT: CPT

## 2025-07-23 PROCEDURE — 99233 SBSQ HOSP IP/OBS HIGH 50: CPT | Mod: GC

## 2025-07-23 PROCEDURE — 93005 ELECTROCARDIOGRAM TRACING: CPT

## 2025-07-23 PROCEDURE — 83735 ASSAY OF MAGNESIUM: CPT

## 2025-07-23 PROCEDURE — 82784 ASSAY IGA/IGD/IGG/IGM EACH: CPT

## 2025-07-23 PROCEDURE — 80053 COMPREHEN METABOLIC PANEL: CPT

## 2025-07-23 PROCEDURE — 80048 BASIC METABOLIC PNL TOTAL CA: CPT

## 2025-07-23 PROCEDURE — 86038 ANTINUCLEAR ANTIBODIES: CPT

## 2025-07-23 PROCEDURE — 80307 DRUG TEST PRSMV CHEM ANLYZR: CPT

## 2025-07-23 PROCEDURE — 85025 COMPLETE CBC W/AUTO DIFF WBC: CPT

## 2025-07-23 PROCEDURE — 80074 ACUTE HEPATITIS PANEL: CPT

## 2025-07-23 PROCEDURE — 83605 ASSAY OF LACTIC ACID: CPT

## 2025-07-23 PROCEDURE — 84132 ASSAY OF SERUM POTASSIUM: CPT

## 2025-07-23 PROCEDURE — 71045 X-RAY EXAM CHEST 1 VIEW: CPT

## 2025-07-23 PROCEDURE — 85027 COMPLETE CBC AUTOMATED: CPT

## 2025-07-23 PROCEDURE — 99222 1ST HOSP IP/OBS MODERATE 55: CPT | Mod: GC

## 2025-07-23 PROCEDURE — 84295 ASSAY OF SERUM SODIUM: CPT

## 2025-07-23 PROCEDURE — 87040 BLOOD CULTURE FOR BACTERIA: CPT

## 2025-07-23 PROCEDURE — 87637 SARSCOV2&INF A&B&RSV AMP PRB: CPT

## 2025-07-23 PROCEDURE — 82330 ASSAY OF CALCIUM: CPT

## 2025-07-23 PROCEDURE — 82787 IGG 1 2 3 OR 4 EACH: CPT

## 2025-07-23 PROCEDURE — 84100 ASSAY OF PHOSPHORUS: CPT

## 2025-07-23 PROCEDURE — 87799 DETECT AGENT NOS DNA QUANT: CPT

## 2025-07-23 PROCEDURE — 82803 BLOOD GASES ANY COMBINATION: CPT

## 2025-07-23 PROCEDURE — 87389 HIV-1 AG W/HIV-1&-2 AB AG IA: CPT

## 2025-07-23 PROCEDURE — 81001 URINALYSIS AUTO W/SCOPE: CPT

## 2025-07-23 PROCEDURE — 85610 PROTHROMBIN TIME: CPT

## 2025-07-23 PROCEDURE — 86381 MITOCHONDRIAL ANTIBODY EACH: CPT

## 2025-07-23 PROCEDURE — 85018 HEMOGLOBIN: CPT

## 2025-07-23 PROCEDURE — 74176 CT ABD & PELVIS W/O CONTRAST: CPT

## 2025-07-23 PROCEDURE — 85730 THROMBOPLASTIN TIME PARTIAL: CPT

## 2025-07-23 PROCEDURE — 86692 HEPATITIS DELTA AGENT ANTBDY: CPT

## 2025-07-23 PROCEDURE — 84702 CHORIONIC GONADOTROPIN TEST: CPT

## 2025-07-23 PROCEDURE — 87086 URINE CULTURE/COLONY COUNT: CPT

## 2025-07-23 PROCEDURE — 85014 HEMATOCRIT: CPT

## 2025-07-23 PROCEDURE — 36415 COLL VENOUS BLD VENIPUNCTURE: CPT

## 2025-07-23 RX ORDER — ACETAMINOPHEN 500 MG/5ML
1000 LIQUID (ML) ORAL ONCE
Refills: 0 | Status: COMPLETED | OUTPATIENT
Start: 2025-07-23 | End: 2025-07-23

## 2025-07-23 RX ORDER — OXYCODONE HYDROCHLORIDE 30 MG/1
5 TABLET ORAL EVERY 6 HOURS
Refills: 0 | Status: DISCONTINUED | OUTPATIENT
Start: 2025-07-23 | End: 2025-07-24

## 2025-07-23 RX ORDER — OXYCODONE HYDROCHLORIDE 30 MG/1
7.5 TABLET ORAL
Refills: 0 | Status: DISCONTINUED | OUTPATIENT
Start: 2025-07-23 | End: 2025-07-23

## 2025-07-23 RX ADMIN — OXYCODONE HYDROCHLORIDE 5 MILLIGRAM(S): 30 TABLET ORAL at 23:50

## 2025-07-23 RX ADMIN — OXYCODONE HYDROCHLORIDE 5 MILLIGRAM(S): 30 TABLET ORAL at 17:46

## 2025-07-23 RX ADMIN — OXYCODONE HYDROCHLORIDE 5 MILLIGRAM(S): 30 TABLET ORAL at 12:40

## 2025-07-23 RX ADMIN — Medication 81 MILLIGRAM(S): at 11:36

## 2025-07-23 RX ADMIN — OXYCODONE HYDROCHLORIDE 5 MILLIGRAM(S): 30 TABLET ORAL at 11:50

## 2025-07-23 RX ADMIN — CEFTRIAXONE 100 MILLIGRAM(S): 500 INJECTION, POWDER, FOR SOLUTION INTRAMUSCULAR; INTRAVENOUS at 23:59

## 2025-07-23 RX ADMIN — Medication 3 MILLIGRAM(S): at 01:40

## 2025-07-23 RX ADMIN — GABAPENTIN 600 MILLIGRAM(S): 400 CAPSULE ORAL at 22:41

## 2025-07-23 RX ADMIN — OXYCODONE HYDROCHLORIDE 7.5 MILLIGRAM(S): 30 TABLET ORAL at 01:59

## 2025-07-23 RX ADMIN — OXYCODONE HYDROCHLORIDE 7.5 MILLIGRAM(S): 30 TABLET ORAL at 01:29

## 2025-07-23 RX ADMIN — Medication 1000 MILLIGRAM(S): at 05:54

## 2025-07-23 RX ADMIN — Medication 400 MILLIGRAM(S): at 05:24

## 2025-07-23 RX ADMIN — CEFTRIAXONE 100 MILLIGRAM(S): 500 INJECTION, POWDER, FOR SOLUTION INTRAMUSCULAR; INTRAVENOUS at 00:05

## 2025-07-23 RX ADMIN — Medication 3 MILLIGRAM(S): at 23:58

## 2025-07-23 RX ADMIN — GABAPENTIN 600 MILLIGRAM(S): 400 CAPSULE ORAL at 14:48

## 2025-07-23 RX ADMIN — GABAPENTIN 600 MILLIGRAM(S): 400 CAPSULE ORAL at 05:25

## 2025-07-23 RX ADMIN — OXYCODONE HYDROCHLORIDE 5 MILLIGRAM(S): 30 TABLET ORAL at 18:20

## 2025-07-23 RX ADMIN — METOPROLOL SUCCINATE 50 MILLIGRAM(S): 50 TABLET, EXTENDED RELEASE ORAL at 05:24

## 2025-07-24 ENCOUNTER — TRANSCRIPTION ENCOUNTER (OUTPATIENT)
Age: 65
End: 2025-07-24

## 2025-07-24 VITALS
DIASTOLIC BLOOD PRESSURE: 81 MMHG | TEMPERATURE: 98 F | SYSTOLIC BLOOD PRESSURE: 116 MMHG | OXYGEN SATURATION: 98 % | RESPIRATION RATE: 18 BRPM | HEART RATE: 87 BPM

## 2025-07-24 LAB
ALBUMIN SERPL ELPH-MCNC: 4.4 G/DL — SIGNIFICANT CHANGE UP (ref 3.3–5)
ALP SERPL-CCNC: 204 U/L — HIGH (ref 40–120)
ALT FLD-CCNC: 147 U/L — HIGH (ref 10–45)
ANA TITR SER: NEGATIVE — SIGNIFICANT CHANGE UP
ANION GAP SERPL CALC-SCNC: 15 MMOL/L — SIGNIFICANT CHANGE UP (ref 5–17)
APAP SERPL-MCNC: <15 UG/ML — SIGNIFICANT CHANGE UP (ref 10–30)
AST SERPL-CCNC: 45 U/L — HIGH (ref 10–40)
BASOPHILS # BLD AUTO: 0.04 K/UL — SIGNIFICANT CHANGE UP (ref 0–0.2)
BASOPHILS NFR BLD AUTO: 0.8 % — SIGNIFICANT CHANGE UP (ref 0–2)
BILIRUB SERPL-MCNC: 0.4 MG/DL — SIGNIFICANT CHANGE UP (ref 0.2–1.2)
BUN SERPL-MCNC: 22 MG/DL — SIGNIFICANT CHANGE UP (ref 7–23)
CALCIUM SERPL-MCNC: 10.6 MG/DL — HIGH (ref 8.4–10.5)
CHLORIDE SERPL-SCNC: 101 MMOL/L — SIGNIFICANT CHANGE UP (ref 96–108)
CMV DNA CSF QL NAA+PROBE: SIGNIFICANT CHANGE UP IU/ML
CMV DNA SPEC NAA+PROBE-LOG#: SIGNIFICANT CHANGE UP LOG10IU/ML
CO2 SERPL-SCNC: 20 MMOL/L — LOW (ref 22–31)
CREAT SERPL-MCNC: 1.02 MG/DL — SIGNIFICANT CHANGE UP (ref 0.5–1.3)
CULTURE RESULTS: NO GROWTH — SIGNIFICANT CHANGE UP
EBV DNA SERPL NAA+PROBE-ACNC: SIGNIFICANT CHANGE UP IU/ML
EBVPCR LOG: SIGNIFICANT CHANGE UP LOG10IU/ML
EGFR: 60 ML/MIN/1.73M2 — SIGNIFICANT CHANGE UP
EGFR: 60 ML/MIN/1.73M2 — SIGNIFICANT CHANGE UP
EOSINOPHIL # BLD AUTO: 0.1 K/UL — SIGNIFICANT CHANGE UP (ref 0–0.5)
EOSINOPHIL NFR BLD AUTO: 2 % — SIGNIFICANT CHANGE UP (ref 0–6)
GLUCOSE SERPL-MCNC: 120 MG/DL — HIGH (ref 70–99)
HCT VFR BLD CALC: 34 % — LOW (ref 34.5–45)
HGB BLD-MCNC: 11.3 G/DL — LOW (ref 11.5–15.5)
IMM GRANULOCYTES # BLD AUTO: 0.09 K/UL — HIGH (ref 0–0.07)
IMM GRANULOCYTES NFR BLD AUTO: 1.8 % — HIGH (ref 0–0.9)
IRON SATN MFR SERPL: 23 % — SIGNIFICANT CHANGE UP (ref 14–50)
IRON SATN MFR SERPL: 88 UG/DL — SIGNIFICANT CHANGE UP (ref 30–160)
LIDOCAIN IGE QN: 66 U/L — HIGH (ref 7–60)
LYMPHOCYTES # BLD AUTO: 1.15 K/UL — SIGNIFICANT CHANGE UP (ref 1–3.3)
LYMPHOCYTES NFR BLD AUTO: 22.5 % — SIGNIFICANT CHANGE UP (ref 13–44)
MAGNESIUM SERPL-MCNC: 2.3 MG/DL — SIGNIFICANT CHANGE UP (ref 1.6–2.6)
MCHC RBC-ENTMCNC: 31.6 PG — SIGNIFICANT CHANGE UP (ref 27–34)
MCHC RBC-ENTMCNC: 33.2 G/DL — SIGNIFICANT CHANGE UP (ref 32–36)
MCV RBC AUTO: 95 FL — SIGNIFICANT CHANGE UP (ref 80–100)
MITOCHONDRIA AB SER-ACNC: SIGNIFICANT CHANGE UP
MONOCYTES # BLD AUTO: 0.56 K/UL — SIGNIFICANT CHANGE UP (ref 0–0.9)
MONOCYTES NFR BLD AUTO: 11 % — SIGNIFICANT CHANGE UP (ref 2–14)
NEUTROPHILS # BLD AUTO: 3.16 K/UL — SIGNIFICANT CHANGE UP (ref 1.8–7.4)
NEUTROPHILS NFR BLD AUTO: 61.9 % — SIGNIFICANT CHANGE UP (ref 43–77)
NRBC # BLD AUTO: 0 K/UL — SIGNIFICANT CHANGE UP (ref 0–0)
NRBC # FLD: 0 K/UL — SIGNIFICANT CHANGE UP (ref 0–0)
NRBC BLD AUTO-RTO: 0 /100 WBCS — SIGNIFICANT CHANGE UP (ref 0–0)
PHOSPHATE SERPL-MCNC: 2.8 MG/DL — SIGNIFICANT CHANGE UP (ref 2.5–4.5)
PLATELET # BLD AUTO: 312 K/UL — SIGNIFICANT CHANGE UP (ref 150–400)
PMV BLD: 8.8 FL — SIGNIFICANT CHANGE UP (ref 7–13)
POTASSIUM SERPL-MCNC: 3.8 MMOL/L — SIGNIFICANT CHANGE UP (ref 3.5–5.3)
POTASSIUM SERPL-SCNC: 3.8 MMOL/L — SIGNIFICANT CHANGE UP (ref 3.5–5.3)
PROT SERPL-MCNC: 8.4 G/DL — HIGH (ref 6–8.3)
RBC # BLD: 3.58 M/UL — LOW (ref 3.8–5.2)
RBC # FLD: 13.2 % — SIGNIFICANT CHANGE UP (ref 10.3–14.5)
SALICYLATES SERPL-MCNC: <2 MG/DL — LOW (ref 15–30)
SMOOTH MUSCLE AB SER-ACNC: SIGNIFICANT CHANGE UP
SODIUM SERPL-SCNC: 136 MMOL/L — SIGNIFICANT CHANGE UP (ref 135–145)
SPECIMEN SOURCE: SIGNIFICANT CHANGE UP
TIBC SERPL-MCNC: 385 UG/DL — SIGNIFICANT CHANGE UP (ref 220–430)
UIBC SERPL-MCNC: 297 UG/DL — SIGNIFICANT CHANGE UP (ref 110–370)
WBC # BLD: 5.1 K/UL — SIGNIFICANT CHANGE UP (ref 3.8–10.5)
WBC # FLD AUTO: 5.1 K/UL — SIGNIFICANT CHANGE UP (ref 3.8–10.5)

## 2025-07-24 PROCEDURE — 85025 COMPLETE CBC W/AUTO DIFF WBC: CPT

## 2025-07-24 PROCEDURE — 86381 MITOCHONDRIAL ANTIBODY EACH: CPT

## 2025-07-24 PROCEDURE — 85730 THROMBOPLASTIN TIME PARTIAL: CPT

## 2025-07-24 PROCEDURE — 80053 COMPREHEN METABOLIC PANEL: CPT

## 2025-07-24 PROCEDURE — 82330 ASSAY OF CALCIUM: CPT

## 2025-07-24 PROCEDURE — 87086 URINE CULTURE/COLONY COUNT: CPT

## 2025-07-24 PROCEDURE — 83605 ASSAY OF LACTIC ACID: CPT

## 2025-07-24 PROCEDURE — 93975 VASCULAR STUDY: CPT

## 2025-07-24 PROCEDURE — 74176 CT ABD & PELVIS W/O CONTRAST: CPT

## 2025-07-24 PROCEDURE — 85018 HEMOGLOBIN: CPT

## 2025-07-24 PROCEDURE — 85027 COMPLETE CBC AUTOMATED: CPT

## 2025-07-24 PROCEDURE — 71045 X-RAY EXAM CHEST 1 VIEW: CPT

## 2025-07-24 PROCEDURE — 82787 IGG 1 2 3 OR 4 EACH: CPT

## 2025-07-24 PROCEDURE — 86255 FLUORESCENT ANTIBODY SCREEN: CPT

## 2025-07-24 PROCEDURE — 84132 ASSAY OF SERUM POTASSIUM: CPT

## 2025-07-24 PROCEDURE — 84702 CHORIONIC GONADOTROPIN TEST: CPT

## 2025-07-24 PROCEDURE — 86790 VIRUS ANTIBODY NOS: CPT

## 2025-07-24 PROCEDURE — 87637 SARSCOV2&INF A&B&RSV AMP PRB: CPT

## 2025-07-24 PROCEDURE — 99239 HOSP IP/OBS DSCHRG MGMT >30: CPT | Mod: GC

## 2025-07-24 PROCEDURE — 93975 VASCULAR STUDY: CPT | Mod: 26

## 2025-07-24 PROCEDURE — 84100 ASSAY OF PHOSPHORUS: CPT

## 2025-07-24 PROCEDURE — 76705 ECHO EXAM OF ABDOMEN: CPT

## 2025-07-24 PROCEDURE — 87389 HIV-1 AG W/HIV-1&-2 AB AG IA: CPT

## 2025-07-24 PROCEDURE — 83735 ASSAY OF MAGNESIUM: CPT

## 2025-07-24 PROCEDURE — 83550 IRON BINDING TEST: CPT

## 2025-07-24 PROCEDURE — 87040 BLOOD CULTURE FOR BACTERIA: CPT

## 2025-07-24 PROCEDURE — 83540 ASSAY OF IRON: CPT

## 2025-07-24 PROCEDURE — 99291 CRITICAL CARE FIRST HOUR: CPT

## 2025-07-24 PROCEDURE — 86692 HEPATITIS DELTA AGENT ANTBDY: CPT

## 2025-07-24 PROCEDURE — 82435 ASSAY OF BLOOD CHLORIDE: CPT

## 2025-07-24 PROCEDURE — 81001 URINALYSIS AUTO W/SCOPE: CPT

## 2025-07-24 PROCEDURE — 36415 COLL VENOUS BLD VENIPUNCTURE: CPT

## 2025-07-24 PROCEDURE — 96374 THER/PROPH/DIAG INJ IV PUSH: CPT

## 2025-07-24 PROCEDURE — 83690 ASSAY OF LIPASE: CPT

## 2025-07-24 PROCEDURE — 76705 ECHO EXAM OF ABDOMEN: CPT | Mod: 26

## 2025-07-24 PROCEDURE — 82803 BLOOD GASES ANY COMBINATION: CPT

## 2025-07-24 PROCEDURE — 80074 ACUTE HEPATITIS PANEL: CPT

## 2025-07-24 PROCEDURE — 85014 HEMATOCRIT: CPT

## 2025-07-24 PROCEDURE — 82947 ASSAY GLUCOSE BLOOD QUANT: CPT

## 2025-07-24 PROCEDURE — 82784 ASSAY IGA/IGD/IGG/IGM EACH: CPT

## 2025-07-24 PROCEDURE — 85610 PROTHROMBIN TIME: CPT

## 2025-07-24 PROCEDURE — 84295 ASSAY OF SERUM SODIUM: CPT

## 2025-07-24 PROCEDURE — 93005 ELECTROCARDIOGRAM TRACING: CPT

## 2025-07-24 PROCEDURE — 87799 DETECT AGENT NOS DNA QUANT: CPT

## 2025-07-24 PROCEDURE — 86038 ANTINUCLEAR ANTIBODIES: CPT

## 2025-07-24 PROCEDURE — 80048 BASIC METABOLIC PNL TOTAL CA: CPT

## 2025-07-24 PROCEDURE — 80307 DRUG TEST PRSMV CHEM ANLYZR: CPT

## 2025-07-24 RX ORDER — CIPROFLOXACIN HCL 250 MG
1 TABLET ORAL
Qty: 2 | Refills: 0
Start: 2025-07-24 | End: 2025-07-24

## 2025-07-24 RX ORDER — ACETAMINOPHEN 500 MG/5ML
1000 LIQUID (ML) ORAL ONCE
Refills: 0 | Status: COMPLETED | OUTPATIENT
Start: 2025-07-24 | End: 2025-07-24

## 2025-07-24 RX ORDER — ROSUVASTATIN CALCIUM 20 MG/1
1 TABLET, FILM COATED ORAL
Refills: 0 | DISCHARGE

## 2025-07-24 RX ORDER — NITROFURANTOIN MACROCRYSTAL 100 MG
1 CAPSULE ORAL
Refills: 0 | DISCHARGE

## 2025-07-24 RX ORDER — GABAPENTIN 400 MG/1
2 CAPSULE ORAL
Qty: 0 | Refills: 0 | DISCHARGE
Start: 2025-07-24

## 2025-07-24 RX ADMIN — GABAPENTIN 600 MILLIGRAM(S): 400 CAPSULE ORAL at 14:28

## 2025-07-24 RX ADMIN — Medication 400 MILLIGRAM(S): at 06:07

## 2025-07-24 RX ADMIN — OXYCODONE HYDROCHLORIDE 5 MILLIGRAM(S): 30 TABLET ORAL at 00:20

## 2025-07-24 RX ADMIN — Medication 81 MILLIGRAM(S): at 14:28

## 2025-07-24 RX ADMIN — GABAPENTIN 600 MILLIGRAM(S): 400 CAPSULE ORAL at 06:04

## 2025-07-24 RX ADMIN — OXYCODONE HYDROCHLORIDE 5 MILLIGRAM(S): 30 TABLET ORAL at 06:03

## 2025-07-24 RX ADMIN — Medication 1000 MILLIGRAM(S): at 06:37

## 2025-07-24 RX ADMIN — OXYCODONE HYDROCHLORIDE 5 MILLIGRAM(S): 30 TABLET ORAL at 15:48

## 2025-07-24 RX ADMIN — OXYCODONE HYDROCHLORIDE 5 MILLIGRAM(S): 30 TABLET ORAL at 14:29

## 2025-07-24 RX ADMIN — METOPROLOL SUCCINATE 50 MILLIGRAM(S): 50 TABLET, EXTENDED RELEASE ORAL at 06:04

## 2025-07-24 RX ADMIN — OXYCODONE HYDROCHLORIDE 5 MILLIGRAM(S): 30 TABLET ORAL at 06:33

## 2025-07-25 LAB
ANNOTATION COMMENT IMP: SIGNIFICANT CHANGE UP
HEV AB FLD QL: NEGATIVE — SIGNIFICANT CHANGE UP
HEV IGM SER QL: NEGATIVE — SIGNIFICANT CHANGE UP
HEV RNA SERPL NAA+PROBE-ACNC: <1800 IU/ML — SIGNIFICANT CHANGE UP
HEV RNA SERPL NAA+PROBE-LOG IU: <3.3 LOG IU/ML — SIGNIFICANT CHANGE UP
SPECIMEN SOURCE: SIGNIFICANT CHANGE UP

## 2025-07-26 LAB
HDV AB SER-ACNC: NEGATIVE — SIGNIFICANT CHANGE UP
HDV IGM SER QL IA: SIGNIFICANT CHANGE UP

## 2025-07-28 ENCOUNTER — APPOINTMENT (OUTPATIENT)
Dept: PAIN MANAGEMENT | Facility: CLINIC | Age: 65
End: 2025-07-28
Payer: MEDICARE

## 2025-07-28 DIAGNOSIS — M54.12 RADICULOPATHY, CERVICAL REGION: ICD-10-CM

## 2025-07-28 LAB
CULTURE RESULTS: SIGNIFICANT CHANGE UP
CULTURE RESULTS: SIGNIFICANT CHANGE UP
SPECIMEN SOURCE: SIGNIFICANT CHANGE UP
SPECIMEN SOURCE: SIGNIFICANT CHANGE UP

## 2025-07-28 PROCEDURE — 99213 OFFICE O/P EST LOW 20 MIN: CPT | Mod: 2W

## 2025-07-28 RX ORDER — GABAPENTIN 600 MG/1
600 TABLET, COATED ORAL
Qty: 270 | Refills: 0 | Status: ACTIVE | COMMUNITY
Start: 2025-07-28 | End: 1900-01-01

## 2025-08-20 ENCOUNTER — APPOINTMENT (OUTPATIENT)
Dept: PAIN MANAGEMENT | Facility: CLINIC | Age: 65
End: 2025-08-20
Payer: MEDICARE

## 2025-08-20 DIAGNOSIS — M43.22 FUSION OF SPINE, CERVICAL REGION: ICD-10-CM

## 2025-08-20 PROCEDURE — 99212 OFFICE O/P EST SF 10 MIN: CPT | Mod: 2W

## 2025-08-20 RX ORDER — MORPHINE SULFATE 15 MG/1
15 TABLET, FILM COATED, EXTENDED RELEASE ORAL AT BEDTIME
Qty: 30 | Refills: 0 | Status: ACTIVE | COMMUNITY
Start: 2025-08-20 | End: 1900-01-01

## (undated) DEVICE — LONE STAR ELASTIC STAY HOOK 5MM SHARP

## (undated) DEVICE — FOLEY CATH 2-WAY 18FR 5CC SILICONE

## (undated) DEVICE — XI ARM NEEDLE DRIVER LARGE

## (undated) DEVICE — TROCAR SURGIQUEST AIRSEAL 8MMX100MM

## (undated) DEVICE — IRRIGATOR BIO SHIELD

## (undated) DEVICE — NDL HYPO REGULAR BEVEL 22G X 1.5" (TURQUOISE)

## (undated) DEVICE — SNARE CAPTIVATOR COLD RND STIFF 10X2.4X2.8MM 240CM

## (undated) DEVICE — ENDOCATCH 10MM SPECIMEN POUCH

## (undated) DEVICE — SUT MAXON 2-0 27" T-5

## (undated) DEVICE — XI TIP COVER

## (undated) DEVICE — SUT POLYSORB 2-0 18" V-20

## (undated) DEVICE — SUT PDS II 2-0 27" SH

## (undated) DEVICE — SOL IRR POUR H2O 250ML

## (undated) DEVICE — POLY TRAP ETRAP

## (undated) DEVICE — SYR LUER LOK 10CC

## (undated) DEVICE — XI DRAPE COLUMN

## (undated) DEVICE — TUBING SUCTION 20FT

## (undated) DEVICE — SUT GORETEX CV-2 (0) 36" PH-24

## (undated) DEVICE — CATH IV SAFE BC 22G X 1" (BLUE)

## (undated) DEVICE — LONE STAR RETRACTOR RING 32.5CM X 18.3CM DISP

## (undated) DEVICE — NDL HYPO SAFE 22G X 1.5" (BLACK)

## (undated) DEVICE — ENDOCATCH II 15MM

## (undated) DEVICE — SHEARS COVIDIEN ENDO SHEAR 5MM X 31CM W UNIPOLAR CAUTERY

## (undated) DEVICE — STAPLER SKIN VISI-STAT 35 WIDE

## (undated) DEVICE — BRUSH COLONOSCOPY CYTOLOGY

## (undated) DEVICE — PREP CHLOROHEXIDINE 4% 118CC KIT

## (undated) DEVICE — GLV 8 PROTEXIS (WHITE)

## (undated) DEVICE — FOLEY CATH 2-WAY 18FR 5CC LATEX HYDROGEL

## (undated) DEVICE — SUT BIOSYN 4-0 18" P-12

## (undated) DEVICE — SUT POLYSORB 0 30" GS-23

## (undated) DEVICE — SYR LUER LOK 50CC

## (undated) DEVICE — SYR LUER LOK 20CC

## (undated) DEVICE — SUT CLIP LAPRA-TY ABSORBABLE SIZE 0.118 TO 0.12" VIOLET

## (undated) DEVICE — SOL IRR BAG H2O 3000ML

## (undated) DEVICE — XI ARM NEEDLE DRIVER SUTURECUT MEGA 8MM

## (undated) DEVICE — SPECIMEN CONTAINER 100ML

## (undated) DEVICE — XI ARM NEEDLE DRIVER MEGA

## (undated) DEVICE — SUCTION YANKAUER NO CONTROL VENT

## (undated) DEVICE — DRAPE GYN W LEGGINGS 3X125"

## (undated) DEVICE — BIOPSY FORCEP RADIAL JAW 4 STANDARD WITH NEEDLE

## (undated) DEVICE — POSITIONER PURPLE ARM ONE STEP (LARGE)

## (undated) DEVICE — SOL IRR POUR NS 0.9% 500ML

## (undated) DEVICE — TUBING AIRSEAL TRI-LUMEN FILTERED

## (undated) DEVICE — PREP BETADINE KIT

## (undated) DEVICE — TUBING STRYKEFLOW II SUCTION / IRRIGATOR

## (undated) DEVICE — GLV 7 PROTEXIS (WHITE)

## (undated) DEVICE — DRAPE INSTRUMENT POUCH 6.75" X 11"

## (undated) DEVICE — XI ARM FORCEP FENESTRATED BIPOLAR 8MM

## (undated) DEVICE — SOL INJ NS 0.9% 500ML 2 PORT

## (undated) DEVICE — LAP PAD 4 X 18"

## (undated) DEVICE — APPLICATOR SURGICEL LAP TROCAR POINT 2.5MM X 150MM

## (undated) DEVICE — DRAPE 3/4 SHEET W REINFORCEMENT 56X77"

## (undated) DEVICE — SENSOR O2 FINGER ADULT

## (undated) DEVICE — TUBING SUCTION CONN 6FT STERILE

## (undated) DEVICE — ELCTR GROUNDING PAD ADULT COVIDIEN

## (undated) DEVICE — TUBING IV SET GRAVITY 3Y 100" MACRO

## (undated) DEVICE — FOLEY TRAY 16FR LF URINE METER SURESTEP

## (undated) DEVICE — DRAPE TOWEL BLUE 17" X 24"

## (undated) DEVICE — MARKING PEN W RULER

## (undated) DEVICE — NDL COUNTER FOAM AND MAGNET 40-70

## (undated) DEVICE — POSITIONER PINK PAD PIGAZZI SYSTEM

## (undated) DEVICE — PREP BETADINE SPONGE STICKS

## (undated) DEVICE — SUT BIOSYN 2-0 27" V-20

## (undated) DEVICE — PACK IV START WITH CHG

## (undated) DEVICE — XI ARM GRASPER TIP UP FENESTRATED

## (undated) DEVICE — DRSG KLING 4"

## (undated) DEVICE — XI ARM FORCEP MARYLAND BIPOLAR

## (undated) DEVICE — LUBRICANT INST ELECTROLUBE Z SOLUTION

## (undated) DEVICE — GLV 7.5 PROTEXIS (WHITE)

## (undated) DEVICE — SUT POLYSORB 2-0 30" V-20 UNDYED

## (undated) DEVICE — CLAMP BX HOT RAD JAW 3

## (undated) DEVICE — FOLEY HOLDER STATLOCK 2 WAY ADULT

## (undated) DEVICE — XI ARM SCISSOR MONO CURVED

## (undated) DEVICE — SUT TICRON 2-0 30" GS22

## (undated) DEVICE — XI SEAL UNIV 5- 8 MM

## (undated) DEVICE — TUBING TUR 2 PRONG

## (undated) DEVICE — MEDICATION LABELS W MARKER

## (undated) DEVICE — XI OBTURATOR OPTICAL BLADELESS 8MM

## (undated) DEVICE — PREP CHLORAPREP HI-LITE ORANGE 26ML

## (undated) DEVICE — WARMING BLANKET UPPER ADULT

## (undated) DEVICE — XI DRAPE ARM

## (undated) DEVICE — ELCTR BOVIE PENCIL SMOKE EVACUATION

## (undated) DEVICE — GLV 8.5 PROTEXIS (WHITE)

## (undated) DEVICE — D HELP - CLEARVIEW CLEARIFY SYSTEM

## (undated) DEVICE — XI ARM FORCEP TENACULUM

## (undated) DEVICE — FORCEP RADIAL JAW 4 JUMBO 2.8MM 3.2MM 240CM ORANGE DISP

## (undated) DEVICE — DRSG STERISTRIPS 0.5 X 4"

## (undated) DEVICE — VENODYNE/SCD SLEEVE CALF LARGE

## (undated) DEVICE — BLADE SCALPEL SAFETYLOCK #15

## (undated) DEVICE — POSITIONER FOAM EGG CRATE ULNAR 2PCS (PINK)

## (undated) DEVICE — DRAPE MAYO STAND 30"

## (undated) DEVICE — CATH IV SAFE BC 20G X 1.16" (PINK)

## (undated) DEVICE — DRAPE LIGHT HANDLE COVER (BLUE)

## (undated) DEVICE — PACK GYN LAPAROSCOPY

## (undated) DEVICE — GLV 6.5 PROTEXIS (WHITE)